# Patient Record
Sex: MALE | Race: BLACK OR AFRICAN AMERICAN | NOT HISPANIC OR LATINO | URBAN - METROPOLITAN AREA
[De-identification: names, ages, dates, MRNs, and addresses within clinical notes are randomized per-mention and may not be internally consistent; named-entity substitution may affect disease eponyms.]

---

## 2018-03-06 ENCOUNTER — OUTPATIENT (OUTPATIENT)
Dept: OUTPATIENT SERVICES | Facility: HOSPITAL | Age: 18
LOS: 1 days | Discharge: HOME | End: 2018-03-06
Payer: COMMERCIAL

## 2018-03-06 LAB
ALBUMIN SERPL ELPH-MCNC: 5.1 G/DL — HIGH (ref 3.3–5)
ALBUMIN SERPL ELPH-MCNC: 5.1 G/DL — HIGH (ref 3.3–5)
ALP SERPL-CCNC: 56 U/L — LOW (ref 60–270)
ALP SERPL-CCNC: 56 U/L — LOW (ref 60–270)
ALT FLD-CCNC: 13 U/L — SIGNIFICANT CHANGE UP (ref 4–41)
ALT FLD-CCNC: 13 U/L — SIGNIFICANT CHANGE UP (ref 4–41)
AST SERPL-CCNC: 19 U/L — SIGNIFICANT CHANGE UP (ref 4–40)
AST SERPL-CCNC: 19 U/L — SIGNIFICANT CHANGE UP (ref 4–40)
BASOPHILS # BLD AUTO: 0.02 K/UL — SIGNIFICANT CHANGE UP (ref 0–0.2)
BASOPHILS NFR BLD AUTO: 0.4 % — SIGNIFICANT CHANGE UP (ref 0–2)
BILIRUB DIRECT SERPL-MCNC: 0.2 MG/DL — SIGNIFICANT CHANGE UP (ref 0.1–0.2)
BILIRUB SERPL-MCNC: 0.7 MG/DL — SIGNIFICANT CHANGE UP (ref 0.2–1.2)
BILIRUB SERPL-MCNC: 0.7 MG/DL — SIGNIFICANT CHANGE UP (ref 0.2–1.2)
BUN SERPL-MCNC: 14 MG/DL — SIGNIFICANT CHANGE UP (ref 7–23)
CALCIUM SERPL-MCNC: 9.3 MG/DL — SIGNIFICANT CHANGE UP (ref 8.4–10.5)
CHLORIDE SERPL-SCNC: 101 MMOL/L — SIGNIFICANT CHANGE UP (ref 98–107)
CHOLEST SERPL-MCNC: 156 MG/DL — SIGNIFICANT CHANGE UP (ref 120–199)
CO2 SERPL-SCNC: 31 MMOL/L — SIGNIFICANT CHANGE UP (ref 22–31)
CREAT SERPL-MCNC: 0.94 MG/DL — SIGNIFICANT CHANGE UP (ref 0.5–1.3)
EOSINOPHIL # BLD AUTO: 0.08 K/UL — SIGNIFICANT CHANGE UP (ref 0–0.5)
EOSINOPHIL NFR BLD AUTO: 1.8 % — SIGNIFICANT CHANGE UP (ref 0–6)
GLUCOSE SERPL-MCNC: 85 MG/DL — SIGNIFICANT CHANGE UP (ref 70–99)
HCT VFR BLD CALC: 43.3 % — SIGNIFICANT CHANGE UP (ref 39–50)
HDLC SERPL-MCNC: 62 MG/DL — HIGH (ref 35–55)
HGB BLD-MCNC: 14.7 G/DL — SIGNIFICANT CHANGE UP (ref 13–17)
IMM GRANULOCYTES # BLD AUTO: 0.01 # — SIGNIFICANT CHANGE UP
IMM GRANULOCYTES NFR BLD AUTO: 0.2 % — SIGNIFICANT CHANGE UP (ref 0–1.5)
LIPID PNL WITH DIRECT LDL SERPL: 85 MG/DL — SIGNIFICANT CHANGE UP
LYMPHOCYTES # BLD AUTO: 1.82 K/UL — SIGNIFICANT CHANGE UP (ref 1–3.3)
LYMPHOCYTES # BLD AUTO: 40.5 % — SIGNIFICANT CHANGE UP (ref 13–44)
MCHC RBC-ENTMCNC: 30.4 PG — SIGNIFICANT CHANGE UP (ref 27–34)
MCHC RBC-ENTMCNC: 33.9 % — SIGNIFICANT CHANGE UP (ref 32–36)
MCV RBC AUTO: 89.6 FL — SIGNIFICANT CHANGE UP (ref 80–100)
MONOCYTES # BLD AUTO: 0.37 K/UL — SIGNIFICANT CHANGE UP (ref 0–0.9)
MONOCYTES NFR BLD AUTO: 8.2 % — SIGNIFICANT CHANGE UP (ref 2–14)
NEUTROPHILS # BLD AUTO: 2.19 K/UL — SIGNIFICANT CHANGE UP (ref 1.8–7.4)
NEUTROPHILS NFR BLD AUTO: 48.9 % — SIGNIFICANT CHANGE UP (ref 43–77)
NRBC # FLD: 0 — SIGNIFICANT CHANGE UP
PLATELET # BLD AUTO: 184 K/UL — SIGNIFICANT CHANGE UP (ref 150–400)
PMV BLD: 11.7 FL — SIGNIFICANT CHANGE UP (ref 7–13)
POTASSIUM SERPL-MCNC: 4.2 MMOL/L — SIGNIFICANT CHANGE UP (ref 3.5–5.3)
POTASSIUM SERPL-SCNC: 4.2 MMOL/L — SIGNIFICANT CHANGE UP (ref 3.5–5.3)
PROT SERPL-MCNC: 7.8 G/DL — SIGNIFICANT CHANGE UP (ref 6–8.3)
PROT SERPL-MCNC: 7.8 G/DL — SIGNIFICANT CHANGE UP (ref 6–8.3)
RBC # BLD: 4.83 M/UL — SIGNIFICANT CHANGE UP (ref 4.2–5.8)
RBC # FLD: 13 % — SIGNIFICANT CHANGE UP (ref 10.3–14.5)
SODIUM SERPL-SCNC: 143 MMOL/L — SIGNIFICANT CHANGE UP (ref 135–145)
T4 FREE SERPL-MCNC: 1.27 NG/DL — SIGNIFICANT CHANGE UP (ref 0.9–1.8)
TRIGL SERPL-MCNC: 42 MG/DL — SIGNIFICANT CHANGE UP (ref 10–149)
TSH SERPL-MCNC: 1.42 UIU/ML — SIGNIFICANT CHANGE UP (ref 0.5–4.3)
WBC # BLD: 4.49 K/UL — SIGNIFICANT CHANGE UP (ref 3.8–10.5)
WBC # FLD AUTO: 4.49 K/UL — SIGNIFICANT CHANGE UP (ref 3.8–10.5)

## 2018-03-06 PROCEDURE — 90792 PSYCH DIAG EVAL W/MED SRVCS: CPT

## 2018-03-07 DIAGNOSIS — F39 UNSPECIFIED MOOD [AFFECTIVE] DISORDER: ICD-10-CM

## 2018-03-07 LAB — PROLACTIN SERPL-MCNC: 16.9 NG/ML — SIGNIFICANT CHANGE UP (ref 4.1–18.4)

## 2018-03-16 PROBLEM — Z00.00 ENCOUNTER FOR PREVENTIVE HEALTH EXAMINATION: Status: ACTIVE | Noted: 2018-03-16

## 2018-03-22 ENCOUNTER — OUTPATIENT (OUTPATIENT)
Dept: OUTPATIENT SERVICES | Facility: HOSPITAL | Age: 18
LOS: 1 days | End: 2018-03-22
Payer: SUBSIDIZED

## 2018-03-22 ENCOUNTER — APPOINTMENT (OUTPATIENT)
Dept: MRI IMAGING | Facility: HOSPITAL | Age: 18
End: 2018-03-22

## 2018-03-22 DIAGNOSIS — Z00.6 ENCOUNTER FOR EXAMINATION FOR NORMAL COMPARISON AND CONTROL IN CLINICAL RESEARCH PROGRAM: ICD-10-CM

## 2018-03-22 PROCEDURE — 70551 MRI BRAIN STEM W/O DYE: CPT | Mod: 26

## 2018-03-22 PROCEDURE — 70551 MRI BRAIN STEM W/O DYE: CPT

## 2018-05-22 ENCOUNTER — APPOINTMENT (OUTPATIENT)
Dept: MRI IMAGING | Facility: HOSPITAL | Age: 18
End: 2018-05-22

## 2018-05-22 ENCOUNTER — OUTPATIENT (OUTPATIENT)
Dept: OUTPATIENT SERVICES | Facility: HOSPITAL | Age: 18
LOS: 1 days | End: 2018-05-22
Payer: SUBSIDIZED

## 2018-05-22 DIAGNOSIS — Z00.6 ENCOUNTER FOR EXAMINATION FOR NORMAL COMPARISON AND CONTROL IN CLINICAL RESEARCH PROGRAM: ICD-10-CM

## 2018-05-22 PROCEDURE — 70551 MRI BRAIN STEM W/O DYE: CPT

## 2018-05-22 PROCEDURE — 70551 MRI BRAIN STEM W/O DYE: CPT | Mod: 26

## 2018-06-06 ENCOUNTER — OUTPATIENT (OUTPATIENT)
Dept: OUTPATIENT SERVICES | Facility: HOSPITAL | Age: 18
LOS: 1 days | Discharge: HOME | End: 2018-06-06

## 2018-07-27 ENCOUNTER — APPOINTMENT (OUTPATIENT)
Dept: MRI IMAGING | Facility: HOSPITAL | Age: 18
End: 2018-07-27

## 2018-08-06 ENCOUNTER — APPOINTMENT (OUTPATIENT)
Dept: MRI IMAGING | Facility: HOSPITAL | Age: 18
End: 2018-08-06

## 2018-08-06 ENCOUNTER — OUTPATIENT (OUTPATIENT)
Dept: OUTPATIENT SERVICES | Facility: HOSPITAL | Age: 18
LOS: 1 days | End: 2018-08-06
Payer: SUBSIDIZED

## 2018-08-06 DIAGNOSIS — G93.9 DISORDER OF BRAIN, UNSPECIFIED: ICD-10-CM

## 2018-08-06 PROCEDURE — 70551 MRI BRAIN STEM W/O DYE: CPT

## 2018-08-07 PROCEDURE — 70551 MRI BRAIN STEM W/O DYE: CPT | Mod: 26

## 2018-09-12 ENCOUNTER — OUTPATIENT (OUTPATIENT)
Dept: OUTPATIENT SERVICES | Facility: HOSPITAL | Age: 18
LOS: 1 days | Discharge: HOME | End: 2018-09-12
Payer: COMMERCIAL

## 2018-09-12 PROCEDURE — 90792 PSYCH DIAG EVAL W/MED SRVCS: CPT

## 2018-09-25 DIAGNOSIS — F32.9 MAJOR DEPRESSIVE DISORDER, SINGLE EPISODE, UNSPECIFIED: ICD-10-CM

## 2018-10-11 ENCOUNTER — EMERGENCY (EMERGENCY)
Facility: HOSPITAL | Age: 18
LOS: 1 days | Discharge: HOME | End: 2018-10-11
Attending: EMERGENCY MEDICINE | Admitting: EMERGENCY MEDICINE
Payer: MEDICAID

## 2018-10-11 VITALS
OXYGEN SATURATION: 99 % | HEART RATE: 79 BPM | RESPIRATION RATE: 18 BRPM | DIASTOLIC BLOOD PRESSURE: 97 MMHG | TEMPERATURE: 100 F | SYSTOLIC BLOOD PRESSURE: 139 MMHG

## 2018-10-11 DIAGNOSIS — R69 ILLNESS, UNSPECIFIED: ICD-10-CM

## 2018-10-11 DIAGNOSIS — F32.9 MAJOR DEPRESSIVE DISORDER, SINGLE EPISODE, UNSPECIFIED: ICD-10-CM

## 2018-10-11 PROCEDURE — 99284 EMERGENCY DEPT VISIT MOD MDM: CPT

## 2018-10-11 PROCEDURE — 90792 PSYCH DIAG EVAL W/MED SRVCS: CPT

## 2018-10-11 RX ORDER — TETANUS TOXOID, REDUCED DIPHTHERIA TOXOID AND ACELLULAR PERTUSSIS VACCINE, ADSORBED 5; 2.5; 8; 8; 2.5 [IU]/.5ML; [IU]/.5ML; UG/.5ML; UG/.5ML; UG/.5ML
0.5 SUSPENSION INTRAMUSCULAR ONCE
Qty: 0 | Refills: 0 | Status: COMPLETED | OUTPATIENT
Start: 2018-10-11 | End: 2018-10-11

## 2018-10-11 RX ADMIN — TETANUS TOXOID, REDUCED DIPHTHERIA TOXOID AND ACELLULAR PERTUSSIS VACCINE, ADSORBED 0.5 MILLILITER(S): 5; 2.5; 8; 8; 2.5 SUSPENSION INTRAMUSCULAR at 20:41

## 2018-10-11 NOTE — ED PROVIDER NOTE - MEDICAL DECISION MAKING DETAILS
SI, pt to be evaluated by psych. do not suspect electrolyte abnormality or ingestion. Will update tetanus shot  as with new abrasions. do not think cellulitis, but will give topical abx.

## 2018-10-11 NOTE — ED PROVIDER NOTE - OBJECTIVE STATEMENT
18M with pmh of depression brought over by his therapist for evaluation for SI. 18M with pmh of depression brought over by his therapist for evaluation for SI. Pt reports that has been feeling down had suicidal thoughts last week and also has been cutting his left forearm. Denies current SI/HI, drug use, hallucinations. Not on meds. Not sure of his last tetanus shot.

## 2018-10-11 NOTE — ED ADULT NURSE NOTE - OBJECTIVE STATEMENT
Patient received in  c/o psych eval, pt is A&Ox4, calm and in good behavioral control. Denies SI at this time, but endorsed previous SI with no plans. pt denies HI&AH. Denies ETOH and substance use. psych eval ongoing

## 2018-10-11 NOTE — ED BEHAVIORAL HEALTH ASSESSMENT NOTE - OTHER PAST PSYCHIATRIC HISTORY (INCLUDE DETAILS REGARDING ONSET, COURSE OF ILLNESS, INPATIENT/OUTPATIENT TREATMENT)
PPH MDD, R/O Prodromal Schizophrenia. Patient has no history of suicide attempts. [facility]es [facility].

## 2018-10-11 NOTE — ED BEHAVIORAL HEALTH ASSESSMENT NOTE - DESCRIPTION
During course of ED visit patient was calm and cooperative. Patient was not aggressive or violent and did not require PRN medications.    Vital Signs Last 24 Hrs  T(C): 37.6 (11 Oct 2018 15:48), Max: 37.6 (11 Oct 2018 15:48)  T(F): 99.6 (11 Oct 2018 15:48), Max: 99.6 (11 Oct 2018 15:48)  HR: 79 (11 Oct 2018 15:48) (79 - 79)  BP: 139/97 (11 Oct 2018 15:48) (139/97 - 139/97)  BP(mean): --  RR: 18 (11 Oct 2018 15:48) (18 - 18)  SpO2: 99% (11 Oct 2018 15:48) (99% - 99%) none see hpi

## 2018-10-11 NOTE — ED ADULT NURSE NOTE - NSIMPLEMENTINTERV_GEN_ALL_ED
Implemented All Universal Safety Interventions:  [place] to call system. Call bell, personal items and telephone within reach. Instruct patient to call for assistance. Room bathroom lighting operational. Non-slip footwear when patient is off stretcher. Physically safe environment: no spills, clutter or unnecessary equipment. Stretcher in lowest position, wheels locked, appropriate side rails in place.

## 2018-10-11 NOTE — ED ADULT TRIAGE NOTE - CHIEF COMPLAINT QUOTE
pt arrives with his psychologist to ER, states that he has been feeling depressed over the past few months, worsening this week. states he was cutting himself and left messages on the therapist phone saying he wanted to kill himself with alcohol and slitting his wrist. pt has multiple cuts on bilateral wrist not open or bleeding at this time. pt states currently does not feel suicidal, but agrees to feeling depressed and anxious.

## 2018-10-11 NOTE — ED BEHAVIORAL HEALTH NOTE - BEHAVIORAL HEALTH NOTE
Writer contacted patient's father Avery Yost [phone].  Writer informed Mr. Yost that his son was currently at the [facility] Emergency Department.  Writer assured pt's father that pt was stable and safe but staff needed collateral information.  Father was asked if patient had exhibited any troubling behavior recently.  Father denied any troubling behaviors recently but did say that he noticed that pt had "scrapes on his arms and legs."  When he asked his son about the scrapes pt stated that he scratched his himself on his desk.  Mr. Yost said that he examined the desk and asked pt why he would sit so close to the desk to which pt responded "I don't know."  Mr. Yost denied patient had suicidal ideation, self injurious behavior, auditory/visual hallucinations.  Mr. Yost stated that pt did not have assess to firearms or weapons such as razors.  Mr. Yost stated that his son was "depressed or something like that one or two years ago."  Patient failed out of college last semester; Mr Yost stated that it was due to his lack of motivation.  Mr. Yost also said that pt had a similar issue with motivation during his senior year of high school.  Writer informed Mr. Yost that his son would be discharged and he was given the address of the hospital.  He said that he was currently in [place] and that he would not be able to  his son until after 9pm. Writer contacted patient's father Avery Yost [phone]. Father was asked if patient had exhibited any troubling behavior recently.  Father denied any troubling behaviors recently but did say that he noticed that pt had "scrapes on his arms and legs."  When he asked his son about the scrapes pt stated that he scratched his himself on his desk.  Mr. Yost said that he examined the desk and asked pt why he would sit so close to the desk to which pt responded "I don't know."  Mr. Yost denied patient had suicidal ideation, auditory/visual hallucinations.  Mr. Yost stated that pt did not have assess to firearms or weapons.  Mr. Yost stated that his son was "depressed or something like that one or two years ago."  Patient failed out of college last semester; Mr Yost stated that it was due to his lack of motivation.  Mr. Yost also said that pt had a similar issue with motivation during his senior year of high school.  He had no safety concerns regarding patient. Writer contacted patient's father Avery Yost [phone]. Father was asked if patient had exhibited any troubling behavior recently.  Father denied any troubling behaviors recently but did say that he noticed that pt had "scrapes on his arms and legs."  When he asked his son about the scrapes pt stated that he scratched his himself on his desk. Mr. Yost denied patient had suicidal ideation, auditory/visual hallucinations.  Mr. Yost stated that pt did not have assess to firearms or weapons.  He said patient is acting normally. Mr. Yost stated that his son was "depressed or something like that one or two years ago."  Patient failed out of college last semester; Mr Yost stated that it was due to his lack of motivation.  Mr. Yost also said that pt had a similar issue with motivation during his senior year of high school.  He had no safety concerns regarding patient.

## 2018-10-11 NOTE — ED BEHAVIORAL HEALTH ASSESSMENT NOTE - SUMMARY
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 PPH MDD, R/O Prodromal Schizophrenia. Patient has no history of suicide attempts.  Chronic history of NSSIB. He has no history of aggressive or violent behavior. Patient has a past history of substance use- binge drinking alcohol in HS- last drank 2 "coffee cups" of alcohol last week; reports periodic use since HS. He has no history of detox/rehab or withdrawal symptoms. He has no legal issues. He denies [facility]. [facility] therapist for suicidal ideation.    Patient presents to the ER in the context  of suicidal ideation. Patient adamantly denies SI/HI/urges for SIB/intent/plan. He endorsed SIB without suicidal intent and drinking last week due to stressful conversation with mom. He stated this week his mood and symptoms feel better as he is moving past conversation with mother. Per collateral and past documentation from EM note 10/4/18 patient has a history of making provocative suicidal statements. Patient denies SI/HI/urges for SIB/intent/plan. He is future oriented and able to safety plan. Patient was offered and refused inpatient psychiatric admission. He does not meet criteria for involuntary inpatient admission. Recommend discharge home and follow up with Dr. Schreiber tomorrow at 12pm. Extensive safety planning performed. Patient and family agreeing verbally to return patient to ER or call 911 if symptoms worsen or patient has urges to harm self or others. 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 PPH MDD, R/O Prodromal Schizophrenia. Patient has no history of suicide attempts.  Chronic history of NSSIB. He has no history of aggressive or violent behavior. Patient has a past history of substance use- binge drinking alcohol in HS- last drank 2 "coffee cups" of alcohol last week; reports periodic use since HS. He has no history of detox/rehab or withdrawal symptoms. He has no legal issues. He denies [facility]. [facility] therapist for suicidal ideation.    Patient presents to the ER in the context  of suicidal ideation. Patient adamantly denies SI/HI/urges for SIB/intent/plan. He endorsed SIB without suicidal intent and drinking last week due to stressful conversation with mom. He has a chronic history of NSSIB. He stated this week his mood and symptoms feel better as he is moving past conversation with mother. Per collateral and past documentation from EM note 10/4/18 patient has a history of making provocative suicidal statements. Patient denies SI/HI/urges for SIB/intent/plan. He is future oriented and able to safety plan. Patient was offered and refused inpatient psychiatric admission. He does not meet criteria for involuntary inpatient admission. Recommend discharge home and follow up with Dr. Schreiber tomorrow at 12pm. Extensive safety planning performed. Patient and family agreeing verbally to return patient to ER or call 911 if symptoms worsen or patient has urges to harm self or others.
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
## 2018-10-11 NOTE — ED BEHAVIORAL HEALTH ASSESSMENT NOTE - SUICIDE PROTECTIVE FACTORS
Engaged in work or school/Positive therapeutic relationships/Responsibility to family and others/Future oriented/Supportive social network or family

## 2018-10-11 NOTE — ED BEHAVIORAL HEALTH ASSESSMENT NOTE - SUICIDE RISK FACTORS
History of abuse/trauma/Substance abuse/dependence/Global insomnia History of abuse/trauma/Substance abuse/dependence/Other

## 2018-10-11 NOTE — ED BEHAVIORAL HEALTH ASSESSMENT NOTE - CASE SUMMARY
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 PPH MDD, R/O Prodromal Schizophrenia. Patient has no history of suicide attempts.  Chronic history of NSSIB. He has no history of aggressive or violent behavior. Patient has a past history of substance use- binge drinking alcohol in HS- last drank 2 "coffee cups" of alcohol last week; reports periodic use since HS. He has no history of detox/rehab or withdrawal symptoms. He has no legal issues. He denies [facility]. [facility] therapist for suicidal ideation. Patient has hx of making provocative suicidal statements but by his own admission without intent. Last week, he had emotional dysregulation and cutting secondary to receiving a difficult phone call from his mother, and the patient had a tumultuous upbringing. Collateral hx from dad denies acute safety concerns. He adamantly denies suicidal intent and is able to safety plan, He refused voluntary admission when offered, and as he does not pose an imminent danger to self he does not meet criteria  for involuntary admission. He will follow up with his outpatient psychiatrist tomorrow. Risk planning done with patient.

## 2018-10-11 NOTE — ED BEHAVIORAL HEALTH ASSESSMENT NOTE - NS ED BHA BILLING ATTENDING W NP TRAINEE
Increase protein with meals.  Protein sources such as Greek yogurt, nuts, protein bars and Tofu    10-12 weeks after delivery, will need to have 2 hour glucose tolerance test and repeat A1-C    Continue checking blood sugars four times day    Weekly email with blood sugars updates to [facility] [place] or have Dr. Merida forward them to me    Call if any questions or concerns     [phone]

## 2018-10-11 NOTE — ED PROVIDER NOTE - PHYSICAL EXAMINATION
GEN - NAD; well appearing; A+O x3   HEAD - NC/AT     EYES - EOMI, no conjunctival pallor, no scleral icterus  ENT -   mucous membranes  moist , no discharge      NECK - Neck supple  PULM - CTA b/l,  symmetric breath sounds  COR -  RRR, S1 S2, no murmurs  ABD - , ND, NT, soft, no guarding, no rebound, no masses    BACK - no CVA tenderness, nontender spine     EXTREMS - no edema, no deformity, warm and well perfused    SKIN - L forearm with multiple superficial lacerations, some scabbed over, there is minimal surrounding erythema. No exudate, fluctuance or tenderness. NVI distally.  NEUROLOGIC - alert, sensation nl, motor 5/5 RUE/LUE/RLE/LLE  PSYCH - no SI/HI

## 2018-10-11 NOTE — ED BEHAVIORAL HEALTH ASSESSMENT NOTE - HPI (INCLUDE ILLNESS QUALITY, SEVERITY, DURATION, TIMING, CONTEXT, MODIFYING FACTORS, ASSOCIATED SIGNS AND SYMPTOMS)
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 PPH MDD, R/O Prodromal Schizophrenia. Patient has no history of suicide attempts.  Chronic history of NSSIB. He has no history of aggressive or violent behavior. Patient has a past history of substance use- binge drinking alcohol in HS- last drank 2 "coffee cups" of alcohol last week; reports periodic use since HS. He has no history of detox/rehab or withdrawal symptoms. He has no legal issues. He denies [facility]. [facility] therapist for suicidal ideation.    Patient report he came to the ER because his therapist wanted him to come due to some things she told him about last week. He reports last week his mood was "a little lower," after a conversation he had with his mother. He said his mother called him and told her about his depression and he didn't feel validated. He superficially cut his left forearm horizontally with a razor without suicidal intent to help relieve stress. He stated he also drank to make himself feel better. Patient denied suicidal intent with his actions. He stated he did have a suicidal thought last week after this conversation but denied plan/intent and reports it only lasted a short time and he listened to music and felt better.    He stated he has chronic issues with his sleep cycles (diurnal pattern). He endorsed chronic issues with anxiety which has been "a little worse," since the conversation with his mother but stated he feels good today.    Patient denies any hallucinations, does not report any delusional thought content, denies thought insertion/withdrawal, denies referential thought processes & is not paranoid on interview. Pt is linear,logical, organized and well related. Patient does not report nor exhibit any signs of peter, including irritable or elevated mood, grandiosity, pressured speech, risk-taking behaviors, increase in productivity or agitation. Patient denies any depressive symptoms including depressed mood, anhedonia, changes in energy/concentration/appetite, sleep disturbances, preoccupation with death or feelings of guilt. Patient was confronted with therapist report of suicidal ideation and he adamantly denies SI, intent or plan; denies any HI, violent thoughts. He stated his therapist misunderstood.    He was future oriented and stated this week his mood is better because he is forgetting about the conversation with his mother. 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 He agreed to see Dr. Schreiber tomorrow at 12pm.     See  note for collateral information. 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 PPH MDD, R/O Prodromal Schizophrenia. Patient has no history of suicide attempts.  Chronic history of NSSIB. He has no history of aggressive or violent behavior. Patient has a past history of substance use- binge drinking alcohol in HS- last drank 2 "coffee cups" of alcohol last week; reports periodic use since HS. He has no history of detox/rehab or withdrawal symptoms. He has no legal issues. He denies PM. [facility] therapist for suicidal ideation.    Patient report he came to the ER because his therapist wanted him to come due to some things he told her about last week. He reports last week his mood was "a little lower," after a conversation he had with his mother. He said his mother called him and told her about his depression and he didn't feel validated. He superficially cut his left forearm horizontally with a razor without suicidal intent to help relieve stress. He stated he also drank to make himself feel better. Patient denied suicidal intent with his actions. He stated he did have a suicidal thought last week after this conversation but denied active SI/plan/intent and reports it only lasted a short time and he listened to music and felt better.    He stated he has chronic issues with his sleep cycles (diurnal pattern). He endorsed chronic issues with anxiety which has been "a little worse," since the conversation with his mother but stated he feels good today.    Patient denies any hallucinations, does not report any delusional thought content, denies thought insertion/withdrawal, denies referential thought processes & is not paranoid on interview. Pt is linear, logical, organized and well related. Patient does not report nor exhibit any signs of peter, including irritable or elevated mood, grandiosity, pressured speech, risk-taking behaviors, increase in productivity or agitation. Patient denies anhedonia, changes in energy/concentration/appetite, hopelessness, preoccupation with death or feelings of guilt. Patient was confronted with therapist report of suicidal ideation and he adamantly denies SI, intent or plan; denies any HI, violent thoughts.     He was future oriented and stated this week his mood is better because he is forgetting about the conversation with his mother. 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 He agreed to see Dr. Schreiber tomorrow at 12pm. He stated he plans to  remeron tonight but hasn't previously because "I'm lazy."    See  note for collateral information.

## 2018-10-11 NOTE — ED BEHAVIORAL HEALTH ASSESSMENT NOTE - RISK ASSESSMENT
patient does not present an imminent risk to self or others as evidence by no suicidal thoughts/plan/intent, no urges for continued SIB, no peter, no psychosis, reports improvement in depressed mood, outpatient treaters, no history of suicide attempts, no aggression/violence, no legal issues, future oriented and able to safety plan.     risk factors- NSSIB, history of provocative suicidal statements, r/o borderline personality, history of trauma, periodic alcohol use, diurnal sleep variation

## 2018-10-11 NOTE — ED BEHAVIORAL HEALTH NOTE - BEHAVIORAL HEALTH NOTE
Spoke with Dr. Schreiber at [facility]- He has seen patient 2x. Although patient is in [facility] he does not appear psychotic or report psychotic symptoms. He was contacted today by patient's therapist expressing concern regarding patient and he had planned to see patient but therapist brought patient to the ER. He has only seen patient 2x. Patient endorses depressive symptoms and his suicidal ideation appears possibly chronic. He endorses SIB stating it goes un noticed by his family which in turn makes him self injure more. He recently started patent on Remeron. Spoke with Dr. Schreiber at [facility]- He has seen patient 2x. Although patient is in [facility] he does not appear psychotic or report psychotic symptoms. He was contacted today by patient's therapist expressing concern regarding patient and he had planned to see patient but therapist brought patient to the ER. He stated the patient has stated he does not like the therapist. He has een patient 2x. Patient endorses depressive symptoms and his suicidal ideation appears possibly chronic. Dr. Schreiber reports during last session patient made a provocative suicidal statement. He endorses SIB stating it goes un noticed by his family which in turn makes him self injure more.  He recently started patent on Remeron. He can see patient tomorrow at 12pm. Spoke with Dr. Schreiber at [facility]- He has seen patient 2x. Although patient is in [facility] he does not appear psychotic or report psychotic symptoms. He was contacted today by patient's therapist expressing concern regarding patient and he had planned to see patient but therapist brought patient to the ER. He stated the patient has stated he does not like the therapist. He has seen patient 2x. Patient endorses depressive symptoms and his suicidal ideation appears chronic. Dr. Schreiber reports during last session patient made a provocative suicidal statement. He endorses SIB stating it goes unnoticed by his family.  He recently started patent on Remeron. He can see patient tomorrow at 12pm.

## 2019-05-13 ENCOUNTER — EMERGENCY (EMERGENCY)
Facility: HOSPITAL | Age: 19
LOS: 1 days | Discharge: HOME | End: 2019-05-13
Attending: EMERGENCY MEDICINE | Admitting: EMERGENCY MEDICINE
Payer: COMMERCIAL

## 2019-05-13 VITALS
SYSTOLIC BLOOD PRESSURE: 128 MMHG | DIASTOLIC BLOOD PRESSURE: 85 MMHG | RESPIRATION RATE: 16 BRPM | HEART RATE: 81 BPM | OXYGEN SATURATION: 100 % | TEMPERATURE: 98 F

## 2019-05-13 VITALS
RESPIRATION RATE: 18 BRPM | DIASTOLIC BLOOD PRESSURE: 69 MMHG | OXYGEN SATURATION: 99 % | SYSTOLIC BLOOD PRESSURE: 123 MMHG | HEART RATE: 86 BPM

## 2019-05-13 DIAGNOSIS — F60.3 BORDERLINE PERSONALITY DISORDER: ICD-10-CM

## 2019-05-13 LAB
ALBUMIN SERPL ELPH-MCNC: 4.99 G/DL — SIGNIFICANT CHANGE UP (ref 3.3–5)
ALP SERPL-CCNC: 52 U/L — SIGNIFICANT CHANGE UP (ref 40–120)
ALT FLD-CCNC: 15 U/L — SIGNIFICANT CHANGE UP (ref 4–41)
AMPHET UR-MCNC: NEGATIVE — SIGNIFICANT CHANGE UP
ANION GAP SERPL CALC-SCNC: 14 MMO/L — SIGNIFICANT CHANGE UP (ref 7–14)
APAP SERPL-MCNC: < 15 UG/ML — LOW (ref 15–25)
APPEARANCE UR: CLEAR — SIGNIFICANT CHANGE UP
AST SERPL-CCNC: 22 U/L — SIGNIFICANT CHANGE UP (ref 4–40)
BARBITURATES UR SCN-MCNC: NEGATIVE — SIGNIFICANT CHANGE UP
BASOPHILS # BLD AUTO: 0.03 K/UL — SIGNIFICANT CHANGE UP (ref 0–0.2)
BASOPHILS NFR BLD AUTO: 0.7 % — SIGNIFICANT CHANGE UP (ref 0–2)
BENZODIAZ UR-MCNC: NEGATIVE — SIGNIFICANT CHANGE UP
BILIRUB SERPL-MCNC: 0.3 MG/DL — SIGNIFICANT CHANGE UP (ref 0.2–1.2)
BILIRUB UR-MCNC: NEGATIVE — SIGNIFICANT CHANGE UP
BLOOD UR QL VISUAL: NEGATIVE — SIGNIFICANT CHANGE UP
BUN SERPL-MCNC: 7 MG/DL — SIGNIFICANT CHANGE UP (ref 7–23)
CALCIUM SERPL-MCNC: 9.5 MG/DL — SIGNIFICANT CHANGE UP (ref 8.4–10.5)
CANNABINOIDS UR-MCNC: NEGATIVE — SIGNIFICANT CHANGE UP
CHLORIDE SERPL-SCNC: 105 MMOL/L — SIGNIFICANT CHANGE UP (ref 98–107)
CO2 SERPL-SCNC: 26 MMOL/L — SIGNIFICANT CHANGE UP (ref 22–31)
COCAINE METAB.OTHER UR-MCNC: NEGATIVE — SIGNIFICANT CHANGE UP
COLOR SPEC: COLORLESS — SIGNIFICANT CHANGE UP
CREAT SERPL-MCNC: 0.97 MG/DL — SIGNIFICANT CHANGE UP (ref 0.5–1.3)
EOSINOPHIL # BLD AUTO: 0.03 K/UL — SIGNIFICANT CHANGE UP (ref 0–0.5)
EOSINOPHIL NFR BLD AUTO: 0.7 % — SIGNIFICANT CHANGE UP (ref 0–6)
ETHANOL BLD-MCNC: 188 MG/DL — HIGH
GLUCOSE SERPL-MCNC: 97 MG/DL — SIGNIFICANT CHANGE UP (ref 70–99)
GLUCOSE UR-MCNC: NEGATIVE — SIGNIFICANT CHANGE UP
HCT VFR BLD CALC: 43.2 % — SIGNIFICANT CHANGE UP (ref 39–50)
HGB BLD-MCNC: 14.2 G/DL — SIGNIFICANT CHANGE UP (ref 13–17)
IMM GRANULOCYTES NFR BLD AUTO: 0.2 % — SIGNIFICANT CHANGE UP (ref 0–1.5)
KETONES UR-MCNC: NEGATIVE — SIGNIFICANT CHANGE UP
LEUKOCYTE ESTERASE UR-ACNC: NEGATIVE — SIGNIFICANT CHANGE UP
LIDOCAIN IGE QN: 13.1 U/L — SIGNIFICANT CHANGE UP (ref 7–60)
LYMPHOCYTES # BLD AUTO: 1.96 K/UL — SIGNIFICANT CHANGE UP (ref 1–3.3)
LYMPHOCYTES # BLD AUTO: 47.9 % — HIGH (ref 13–44)
MCHC RBC-ENTMCNC: 28.9 PG — SIGNIFICANT CHANGE UP (ref 27–34)
MCHC RBC-ENTMCNC: 32.9 % — SIGNIFICANT CHANGE UP (ref 32–36)
MCV RBC AUTO: 88 FL — SIGNIFICANT CHANGE UP (ref 80–100)
METHADONE UR-MCNC: NEGATIVE — SIGNIFICANT CHANGE UP
MONOCYTES # BLD AUTO: 0.41 K/UL — SIGNIFICANT CHANGE UP (ref 0–0.9)
MONOCYTES NFR BLD AUTO: 10 % — SIGNIFICANT CHANGE UP (ref 2–14)
NEUTROPHILS # BLD AUTO: 1.65 K/UL — LOW (ref 1.8–7.4)
NEUTROPHILS NFR BLD AUTO: 40.5 % — LOW (ref 43–77)
NITRITE UR-MCNC: NEGATIVE — SIGNIFICANT CHANGE UP
NRBC # FLD: 0 K/UL — SIGNIFICANT CHANGE UP (ref 0–0)
OPIATES UR-MCNC: NEGATIVE — SIGNIFICANT CHANGE UP
OXYCODONE UR-MCNC: NEGATIVE — SIGNIFICANT CHANGE UP
PCP UR-MCNC: NEGATIVE — SIGNIFICANT CHANGE UP
PH UR: 7 — SIGNIFICANT CHANGE UP (ref 5–8)
PLATELET # BLD AUTO: 178 K/UL — SIGNIFICANT CHANGE UP (ref 150–400)
PMV BLD: 10.6 FL — SIGNIFICANT CHANGE UP (ref 7–13)
POTASSIUM SERPL-MCNC: 3.8 MMOL/L — SIGNIFICANT CHANGE UP (ref 3.5–5.3)
POTASSIUM SERPL-SCNC: 3.8 MMOL/L — SIGNIFICANT CHANGE UP (ref 3.5–5.3)
PROT SERPL-MCNC: 7.5 G/DL — SIGNIFICANT CHANGE UP (ref 6–8.3)
PROT UR-MCNC: NEGATIVE — SIGNIFICANT CHANGE UP
RBC # BLD: 4.91 M/UL — SIGNIFICANT CHANGE UP (ref 4.2–5.8)
RBC # FLD: 13.8 % — SIGNIFICANT CHANGE UP (ref 10.3–14.5)
SALICYLATES SERPL-MCNC: < 5 MG/DL — LOW (ref 15–30)
SODIUM SERPL-SCNC: 145 MMOL/L — SIGNIFICANT CHANGE UP (ref 135–145)
SP GR SPEC: 1.01 — SIGNIFICANT CHANGE UP (ref 1–1.04)
TSH SERPL-MCNC: 1.07 UIU/ML — SIGNIFICANT CHANGE UP (ref 0.27–4.2)
UROBILINOGEN FLD QL: NORMAL — SIGNIFICANT CHANGE UP
WBC # BLD: 4.09 K/UL — SIGNIFICANT CHANGE UP (ref 3.8–10.5)
WBC # FLD AUTO: 4.09 K/UL — SIGNIFICANT CHANGE UP (ref 3.8–10.5)

## 2019-05-13 PROCEDURE — 12001 RPR S/N/AX/GEN/TRNK 2.5CM/<: CPT | Mod: RT

## 2019-05-13 PROCEDURE — 99285 EMERGENCY DEPT VISIT HI MDM: CPT | Mod: 25

## 2019-05-13 PROCEDURE — 90792 PSYCH DIAG EVAL W/MED SRVCS: CPT | Mod: GC

## 2019-05-13 RX ORDER — ONDANSETRON 8 MG/1
4 TABLET, FILM COATED ORAL ONCE
Refills: 0 | Status: COMPLETED | OUTPATIENT
Start: 2019-05-13 | End: 2019-05-13

## 2019-05-13 RX ORDER — SODIUM CHLORIDE 9 MG/ML
1000 INJECTION INTRAMUSCULAR; INTRAVENOUS; SUBCUTANEOUS ONCE
Refills: 0 | Status: DISCONTINUED | OUTPATIENT
Start: 2019-05-13 | End: 2019-05-13

## 2019-05-13 RX ORDER — ONDANSETRON 8 MG/1
4 TABLET, FILM COATED ORAL ONCE
Refills: 0 | Status: DISCONTINUED | OUTPATIENT
Start: 2019-05-13 | End: 2019-05-13

## 2019-05-13 RX ADMIN — ONDANSETRON 4 MILLIGRAM(S): 8 TABLET, FILM COATED ORAL at 08:59

## 2019-05-13 NOTE — ED BEHAVIORAL HEALTH ASSESSMENT NOTE - DESCRIPTION
pt has girlfriend. lives with father and grandmother. not currently employed or in school. Pt initially sedated 2/2 intoxication. On awakening patient was calm and cooperative. Patient was not aggressive or violent and did not require PRN medications.    T(C): 37 (05-13-19 @ 07:55), Max: 37 (05-13-19 @ 07:55)  HR: 86 (05-13-19 @ 16:23) (80 - 86)  BP: 123/69 (05-13-19 @ 16:23) (123/69 - 128/85)  RR: 18 (05-13-19 @ 16:23) (16 - 18)  SpO2: 99% (05-13-19 @ 16:23) (99% - 100%)  Wt(kg): -- none pt has girlfriend. lives with father and grandmother. not currently employed or in school. poor relationship with mother per records

## 2019-05-13 NOTE — ED BEHAVIORAL HEALTH ASSESSMENT NOTE - CASE SUMMARY
18M with depression and borderline personality disorder, brought by dad for evaluation of cutting while intoxicated. trigger seemed to be unpleasant conversation with mother on mother's day, though patient is guarded about this fact. he endorses chronic low level depressed, apathy/amotivation/anhedonia. Reports cutting was impulsive, denies suicidal intent at the time, denies current suicidal ideation intent or plan, no violence or homicidal ideation. Patient is not otherwise psychotic/manic. He does not want admission and not meeting imminent harm criteria for commitment. no evidence of etoh withdrawal. DC to follow up at [facility] later this week. dad and outpatient psychiatrist in agreement with plan.
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
## 2019-05-13 NOTE — ED PROVIDER NOTE - PROGRESS NOTE DETAILS
PGY3 Jovani: pt seen and examined by me. 18 YOM pmh of cutting, p/w intoxication and cutting of right forearm. Pt states he was drinking today and was having suicidal thoughts and decided to cut himself. Pt has had multiple attempts of cutting himself in the past. Pt has been seen by therapist in past. Pt was found by father and brought into ed. +active thoughts of SI no plan. Per dad, triggering event might have been an argument with his mother on mother's day yesterday. Pt is clinically intoxicated currently. Plan: monitor for sobriety, Tdap up to date, repair lacerations to forearm. Have pt seen by psychiatry. LOUIE Escalera- pt in , cleared by psych for dc home and pt to be discharged by psych

## 2019-05-13 NOTE — ED ADULT TRIAGE NOTE - CHIEF COMPLAINT QUOTE
Patient from home for etoh intox. Patient admit to SI to EMS but denies SI currently, denies HI. Patient admits to drinking 2 four lokos today. Per family, patient has tried to cut himself to right forearm, right forearm currently wrapped by EMS. MD Solomon called for BH Evaluation, Patient to be seen in the main.

## 2019-05-13 NOTE — ED ADULT NURSE REASSESSMENT NOTE - NS ED NURSE REASSESS COMMENT FT1
pt received from main ED calm and cooperative. dressing to right forearm s/p lacc dermabond. no bleeding or oozing from dressing noted. full ROM and distal pulses on right arm. pt alert and calm gait off balance s/t intoxication. denies active SI but states he "wants to drink", denies history of withdrawal but will monitor closely. pt received from main ED calm and cooperative. dressing to right forearm s/p lacc dermabond. no bleeding or oozing from dressing noted. full ROM and distal pulses on right arm. pt alert and calm gait off balance s/t intoxication. denies active SI but states he "wants to drink", denies history of withdrawal but will monitor closely. CO DC'd by provider

## 2019-05-13 NOTE — ED PROVIDER NOTE - SKIN COLOR
normal for race/multiple superficial linear lacerations noted to right and left forearm. no active bleeding. neurovascularly intact. clean.

## 2019-05-13 NOTE — ED ADULT NURSE NOTE - NSIMPLEMENTINTERV_GEN_ALL_ED
Implemented All Fall Risk Interventions:  [place] to call system. Call bell, personal items and telephone within reach. Instruct patient to call for assistance. Room bathroom lighting operational. Non-slip footwear when patient is off stretcher. Physically safe environment: no spills, clutter or unnecessary equipment. Stretcher in lowest position, wheels locked, appropriate side rails in place. Provide visual cue, wrist band, yellow gown, etc. Monitor gait and stability. Monitor for mental status changes and reorient to person, place, and time. Review medications for side effects contributing to fall risk. Reinforce activity limits and safety measures with patient and family.

## 2019-05-13 NOTE — ED PROVIDER NOTE - OBJECTIVE STATEMENT
19 y/o male with pmhx of depression and borderline personality disorder (2018) presents to ED for suicidal attempt and alcohol intoxication. Pt states he drank two fourlokos and one beer overnight. States he used a clean knife from his kitchen to cut his right forearm. States he cleaned knife with alcohol prior to using it. Multiple cutting incidents in the past. Lives at home with his father and grandmother. States he was planning on drinking all week. As per dad, pt tried calling his mother on Mother's day yesterday which may have triggered incident. Pt admits to active SI, no plans. No thoughts of hurting others. Pt admits to feeling nauseous, no vomiting. No falls or head trauma, no LOC. Brought in by dad. Follows with outpatient psychiatrist Dr. Schreiber. States he was on two medication he stopped taking after 1 month of trying because they don't work. No fever, chills, cp, sob, abd pain, vomiting, diarrhea, dysuria, hallucinations.

## 2019-05-13 NOTE — ED BEHAVIORAL HEALTH ASSESSMENT NOTE - SUMMARY
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    Pt vague/guarded on interview. States that he just wanted to "get drunk" and did not have suicidal intent when cutting. Denies current depression/SI/urges to cut. Pt able to safety plan. Patient and family agreeing verbally to return patient to ER or call 911 if symptoms worsen or patient has urges to harm self or others. Does not meet criteria for inpatient admission at this time.

## 2019-05-13 NOTE — ED PROVIDER NOTE - CLINICAL SUMMARY MEDICAL DECISION MAKING FREE TEXT BOX
19 y/o male with pmhx of depression and borderline personality disorder (2018) presents to ED for suicidal attempt and alcohol intoxication. plan- check labs, antiemetics, fluids, 1:1 observation, psych consult, laceration repair.

## 2019-05-13 NOTE — ED PROVIDER NOTE - ATTENDING CONTRIBUTION TO CARE
edwin: hx from pt and his father at bedside.   pmh includes borderline personality and self-cutting. not on any  meds. was drinling etoh last night and pt cut his forearms last night, he states he 'half wanted' to die and wanted to 'do worse' but didnt.   denies psychiatry admissions. sees a therapist opt.   exam: awake, cooperative, oriented.  superficial lacerations to forearms none appearing infected and none deep.   there are multiple old scars from cutting to upper and lower ext.  labs sent. Psychiatry contacted. pt to be sent to  for further care.

## 2019-05-13 NOTE — ED ADULT NURSE REASSESSMENT NOTE - NS ED NURSE REASSESS COMMENT FT1
patient alert ox3 came in with SI and was drinking. superficial cuts to both arms noted. Dermabond applied by PA. transferred to  for continuity of care.

## 2019-05-13 NOTE — ED BEHAVIORAL HEALTH ASSESSMENT NOTE - SAFETY PLAN DETAILS
Safety planning performed. Patient and family agreeing verbally to return patient to ER or call 911 if symptoms worsen or patient has urges to harm self or others.

## 2019-05-13 NOTE — ED BEHAVIORAL HEALTH ASSESSMENT NOTE - OTHER
father poverty of speech "apathetic" sleep variation interaction with mother recommend abstinence from alcohol, education on signs/symptoms of alcohol withdrawal, encouraged dad to secure access to sharps.

## 2019-05-13 NOTE — ED BEHAVIORAL HEALTH ASSESSMENT NOTE - HPI (INCLUDE ILLNESS QUALITY, SEVERITY, DURATION, TIMING, CONTEXT, MODIFYING FACTORS, ASSOCIATED SIGNS AND SYMPTOMS)
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    Pt vague/guarded on interview. States that he does not remember most of the events from yesterday due to intoxication. He does not recall any stressors/triggers from yesterday and states that he just "wanted to get drunk." He drank 2 four locos and cut himself while intoxicated. He cannot name a reason for cutting himself, states it is just something he does impulsively. He denies suicidal intent. He states he may have had some passing SI yesterday while intoxicated without plan/intent. Denies current SI. Denies current urges to cut. States he is "indifferent" to living. Mood is "apathetic" (states it has been this way for the past year). States he was depressed 1 year ago but has not felt depressed since. States that if he has worsening SI with plan to harm himself he will call 911, or call suicide hotline, or come to ED.    Denies recent depression. Cannot name much that gives him pleasure. Denies low energy/poor concentration. Has had somewhat worsened sleep schedule lately, unsure why. Denies AVH/paranoid delusions/IOR. Denies hx of peter.     See  note for collateral information.

## 2019-05-13 NOTE — ED BEHAVIORAL HEALTH NOTE - BEHAVIORAL HEALTH NOTE
Attempted to evaluate patient but remains sedated and although arousable unable to stay awake for evaluation.  Patient briefly explains that he was "too drunk" and that he "cut" himself but denies suicidal intent.  Patient requires complete psychiatric evaluation, please contact psychiatry when patient is able to participate in evaluation.

## 2019-05-13 NOTE — ED ADULT NURSE NOTE - OBJECTIVE STATEMENT
19 yo M received to room 8 for etoh intox, pt admits to SI, denies HI/AV/VH, active superficial lacerations to b/l forearms, no active bleeding at this time, pt admits to feeling depressed and has a hx depression, anxiety, multiple suicide attempts and BPD, pt father at bedside, PCA at bedside for CO 1:1, will closely monitor 17 yo M received to room 8 for etoh intox, pt admits to SI, denies HI/AV/VH, active superficial lacerations to b/l forearms, no active bleeding at this time, pt admits to feeling depressed, causing him to drink 2 four lokos and self harm, hx depression, anxiety, multiple suicide attempts and BPD, pt calm and cooperative at this time, pt father at bedside, PCA at bedside for CO 1:1, will closely monitor

## 2019-05-13 NOTE — ED BEHAVIORAL HEALTH ASSESSMENT NOTE - RISK ASSESSMENT
Chronic risk factors: hx active and passive SI (no intent or plan), hx NSSIB of cutting, hx binge drinking, BPD, family history of psychiatric illness, hx depression, male gender    Acute risk factors: recent NSSIB of cutting, access to knives, recent binge drinking episode, anhedonia, poor social supports, impulsivity, not currently engaged in school/work, intermittently nonadherent to outpatient tx    Protective factors: able to safety plan, no current SIIP/HIIP, no family hx suicide, no preparatory acts for suicide, identifies coping skills to distract from cutting (eating, listening to music), in a relationship, stable domicile, supportive father, engaged in outpatient treatment, no hx of psychosis    Although pt has multiple risk factors, pt has no active SI/HI at this time and is able to safety plan. He has no hx of suicide attempts or preparatory acts for suicide. He is not at imminent risk of harm to self or others at this time and does not meet criteria for inpatient admission. Chronic risk factors: hx active and passive SI (no intent or plan), hx NSSIB of cutting, hx binge drinking, BPD, family history of psychiatric illness, hx depression, male gender    Acute risk factors: recent NSSIB of cutting, access to knives, recent binge drinking episode, anhedonia, poor social supports, impulsivity, not currently engaged in school/work, intermittently nonadherent to outpatient tx    Protective factors: able to safety plan, no current SIIP/HIIP, no family hx suicide, no preparatory acts for suicide, identifies coping skills to distract from cutting (eating, listening to music), in a relationship, stable domicile, supportive father, engaged in outpatient treatment, no hx of psychosis    Although pt has multiple risk factors, pt has no active SI/HI at this time and is able to safety plan. He has no hx of suicide attempts or preparatory acts for suicide. He is not at imminent risk of harm to self or others at this time and does not meet criteria for involuntary inpatient admission.

## 2019-05-13 NOTE — ED PROVIDER NOTE - CONSTITUTIONAL, MLM
normal... Well appearing, well nourished, awake, alert, oriented to person, place, time/situation and in no apparent distress. Clinically intoxicated.
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
## 2019-05-17 DIAGNOSIS — F32.9 MAJOR DEPRESSIVE DISORDER, SINGLE EPISODE, UNSPECIFIED: ICD-10-CM

## 2020-01-23 ENCOUNTER — EMERGENCY (EMERGENCY)
Facility: HOSPITAL | Age: 20
LOS: 1 days | Discharge: HOME | End: 2020-01-23
Attending: EMERGENCY MEDICINE
Payer: COMMERCIAL

## 2020-01-23 VITALS
OXYGEN SATURATION: 100 % | HEART RATE: 95 BPM | SYSTOLIC BLOOD PRESSURE: 135 MMHG | RESPIRATION RATE: 17 BRPM | DIASTOLIC BLOOD PRESSURE: 87 MMHG

## 2020-01-23 LAB
ALBUMIN SERPL ELPH-MCNC: 4.4 G/DL — SIGNIFICANT CHANGE UP (ref 3.3–5)
ALP SERPL-CCNC: 44 U/L — SIGNIFICANT CHANGE UP (ref 40–120)
ALT FLD-CCNC: 22 U/L — SIGNIFICANT CHANGE UP (ref 10–45)
ANION GAP SERPL CALC-SCNC: 14 MMOL/L — SIGNIFICANT CHANGE UP (ref 5–17)
APAP SERPL-MCNC: <15 UG/ML — SIGNIFICANT CHANGE UP (ref 10–30)
AST SERPL-CCNC: 25 U/L — SIGNIFICANT CHANGE UP (ref 10–40)
BASOPHILS # BLD AUTO: 0.02 K/UL — SIGNIFICANT CHANGE UP (ref 0–0.2)
BASOPHILS NFR BLD AUTO: 0.3 % — SIGNIFICANT CHANGE UP (ref 0–2)
BILIRUB SERPL-MCNC: 0.5 MG/DL — SIGNIFICANT CHANGE UP (ref 0.2–1.2)
BUN SERPL-MCNC: 8 MG/DL — SIGNIFICANT CHANGE UP (ref 7–23)
CALCIUM SERPL-MCNC: 8.6 MG/DL — SIGNIFICANT CHANGE UP (ref 8.4–10.5)
CHLORIDE SERPL-SCNC: 105 MMOL/L — SIGNIFICANT CHANGE UP (ref 96–108)
CO2 SERPL-SCNC: 22 MMOL/L — SIGNIFICANT CHANGE UP (ref 22–31)
CREAT SERPL-MCNC: 1.05 MG/DL — SIGNIFICANT CHANGE UP (ref 0.5–1.3)
EOSINOPHIL # BLD AUTO: 0.04 K/UL — SIGNIFICANT CHANGE UP (ref 0–0.5)
EOSINOPHIL NFR BLD AUTO: 0.6 % — SIGNIFICANT CHANGE UP (ref 0–6)
ETHANOL SERPL-MCNC: 286 MG/DL — HIGH (ref 0–10)
GLUCOSE SERPL-MCNC: 95 MG/DL — SIGNIFICANT CHANGE UP (ref 70–99)
HCT VFR BLD CALC: 42.6 % — SIGNIFICANT CHANGE UP (ref 39–50)
HGB BLD-MCNC: 14.1 G/DL — SIGNIFICANT CHANGE UP (ref 13–17)
IMM GRANULOCYTES NFR BLD AUTO: 0.3 % — SIGNIFICANT CHANGE UP (ref 0–1.5)
LYMPHOCYTES # BLD AUTO: 2.21 K/UL — SIGNIFICANT CHANGE UP (ref 1–3.3)
LYMPHOCYTES # BLD AUTO: 31.3 % — SIGNIFICANT CHANGE UP (ref 13–44)
MCHC RBC-ENTMCNC: 29.7 PG — SIGNIFICANT CHANGE UP (ref 27–34)
MCHC RBC-ENTMCNC: 33.1 GM/DL — SIGNIFICANT CHANGE UP (ref 32–36)
MCV RBC AUTO: 89.7 FL — SIGNIFICANT CHANGE UP (ref 80–100)
MONOCYTES # BLD AUTO: 0.33 K/UL — SIGNIFICANT CHANGE UP (ref 0–0.9)
MONOCYTES NFR BLD AUTO: 4.7 % — SIGNIFICANT CHANGE UP (ref 2–14)
NEUTROPHILS # BLD AUTO: 4.44 K/UL — SIGNIFICANT CHANGE UP (ref 1.8–7.4)
NEUTROPHILS NFR BLD AUTO: 62.8 % — SIGNIFICANT CHANGE UP (ref 43–77)
NRBC # BLD: 0 /100 WBCS — SIGNIFICANT CHANGE UP (ref 0–0)
PLATELET # BLD AUTO: 221 K/UL — SIGNIFICANT CHANGE UP (ref 150–400)
POTASSIUM SERPL-MCNC: 4.1 MMOL/L — SIGNIFICANT CHANGE UP (ref 3.5–5.3)
POTASSIUM SERPL-SCNC: 4.1 MMOL/L — SIGNIFICANT CHANGE UP (ref 3.5–5.3)
PROT SERPL-MCNC: 7.1 G/DL — SIGNIFICANT CHANGE UP (ref 6–8.3)
RBC # BLD: 4.75 M/UL — SIGNIFICANT CHANGE UP (ref 4.2–5.8)
RBC # FLD: 13 % — SIGNIFICANT CHANGE UP (ref 10.3–14.5)
SALICYLATES SERPL-MCNC: <2 MG/DL — LOW (ref 15–30)
SODIUM SERPL-SCNC: 141 MMOL/L — SIGNIFICANT CHANGE UP (ref 135–145)
WBC # BLD: 7.06 K/UL — SIGNIFICANT CHANGE UP (ref 3.8–10.5)
WBC # FLD AUTO: 7.06 K/UL — SIGNIFICANT CHANGE UP (ref 3.8–10.5)

## 2020-01-23 PROCEDURE — 90715 TDAP VACCINE 7 YRS/> IM: CPT

## 2020-01-23 PROCEDURE — 99285 EMERGENCY DEPT VISIT HI MDM: CPT | Mod: 25

## 2020-01-23 PROCEDURE — 85027 COMPLETE CBC AUTOMATED: CPT

## 2020-01-23 PROCEDURE — 90471 IMMUNIZATION ADMIN: CPT

## 2020-01-23 PROCEDURE — 93005 ELECTROCARDIOGRAM TRACING: CPT | Mod: XU

## 2020-01-23 PROCEDURE — 80053 COMPREHEN METABOLIC PANEL: CPT

## 2020-01-23 PROCEDURE — 12007 RPR S/N/AX/GEN/TRNK >30.0 CM: CPT

## 2020-01-23 PROCEDURE — 80307 DRUG TEST PRSMV CHEM ANLYZR: CPT

## 2020-01-23 PROCEDURE — 93010 ELECTROCARDIOGRAM REPORT: CPT | Mod: NC,59

## 2020-01-23 RX ORDER — TETANUS TOXOID, REDUCED DIPHTHERIA TOXOID AND ACELLULAR PERTUSSIS VACCINE, ADSORBED 5; 2.5; 8; 8; 2.5 [IU]/.5ML; [IU]/.5ML; UG/.5ML; UG/.5ML; UG/.5ML
0.5 SUSPENSION INTRAMUSCULAR ONCE
Refills: 0 | Status: COMPLETED | OUTPATIENT
Start: 2020-01-23 | End: 2020-01-23

## 2020-01-23 RX ADMIN — TETANUS TOXOID, REDUCED DIPHTHERIA TOXOID AND ACELLULAR PERTUSSIS VACCINE, ADSORBED 0.5 MILLILITER(S): 5; 2.5; 8; 8; 2.5 SUSPENSION INTRAMUSCULAR at 19:35

## 2020-01-23 NOTE — ED ADULT NURSE NOTE - OBJECTIVE STATEMENT
19y old male w/ hx of past suicidal attempts and depression BIB EMS for suicidal attempt at home today. As per EMS patient barricaded door, was found cutting himself, plastic bag overhead trying to suffocate himself. Patient is very upset states "I do not want to live anymore", endorsees drinking full bottle of Gin today, denies current drug use, denies AH, VH, TH, homicidal ideation. Patient is 19y old male w/ hx of past suicidal attempts and depression BIB EMS for suicidal attempt at home today. As per EMS patient barricaded door, was found cutting himself, plastic bag overhead trying to suffocate himself. Patient is very upset states "I do not want to live anymore", endorsees drinking full bottle of Gin today, denies current drug use, denies AH, VH, TH, homicidal ideation. Patient is very upset, uncooperative, A&Ox3, 4 lacerations present on left arm and 2 lacerations present on right arm, multiple old scars from cutting present on both arms.

## 2020-01-23 NOTE — ED PROVIDER NOTE - PROGRESS NOTE DETAILS
ATTG: : patient endorsed to me by Dr. Smith. awaiting transport to [facility]. accepted and transportation here. mother at the bedside would like to travel with adult patient.

## 2020-01-23 NOTE — ED PROVIDER NOTE - CLINICAL SUMMARY MEDICAL DECISION MAKING FREE TEXT BOX
Patient presenting SI attempt with multiple lacerations to b/l arms. WIll repair lacerations, psych consult, 1:1 obs

## 2020-01-23 NOTE — ED PROVIDER NOTE - NS ED ROS FT
Limited ROS due to patient not cooperative  Refer to HPI  SKIN: lacerations to b/l arms  PSYCH: depression, SI

## 2020-01-23 NOTE — ED ADULT NURSE REASSESSMENT NOTE - NS ED NURSE REASSESS COMMENT FT1
Report received from Kwasi JONES. Aox3 ,speaking in complete sentences. Unlabored, spontaneous respirations, NAD. 1:1 at bedside for direct observation of pt. MD Barraza at bedside for laceration repairs. Report received from Kwasi JONES. Aox3 ,speaking in complete sentences. Unlabored, spontaneous respirations, NAD. 1:1 at bedside for direct observation of pt. MD Barraza at bedside for laceration repairs. Awaiting urine sample, pt aware.

## 2020-01-23 NOTE — ED PROVIDER NOTE - ATTENDING CONTRIBUTION TO CARE
attending Syl: 19yM h/o depression presents after suicide attempt. Patient with multiple self-inflicted lacerations to b/l arms. Patient also noted by EMS to have a plastic bag over his head which patient states he did to try to suffocate himself. Patient not cooperative with history. Appears intoxicated on arrival. Will place on constant observation, labs, psych eval

## 2020-01-23 NOTE — ED PROVIDER NOTE - PHYSICAL EXAMINATION
GEN: NAD, awake, agitated   HEENT: NCAT, MMM, normal conjunctiva, perrl  CHEST/LUNGS: Non-tachypneic, CTAB, bilateral breath sounds  CARDIAC: Non-tachycardic, s1s2, normal perfusion, no peripheral edema  ABDOMEN: Soft, NTND, No rebound/guarding  MSK: No joint tenderness, no gross deformity of extremities  SKIN: multiple lacerations to bilateral arms, no active bleeding   NEURO: moving all extremities, no focal motor or sensory deficits  PSYCH: agitated, endorsing SI

## 2020-01-23 NOTE — ED PROVIDER NOTE - OBJECTIVE STATEMENT
19M with pmh depression presenting with SI attempt. Patient with multiple self induced laceration injuries to b/l arms. Patient also noted by EMS to have a plastic bag over his head which patient states he did to try to suffocate himself. 19M with pmh depression presenting with SI attempt. Patient with multiple self induced laceration injuries to b/l arms. Patient also noted by EMS to have a plastic bag over his head which patient states he did to try to suffocate himself. Patient not cooperative with history. Denies any other acute complaints.

## 2020-01-23 NOTE — ED BEHAVIORAL HEALTH NOTE - BEHAVIORAL HEALTH NOTE
Pt was BIB [facility] after pt spoke to his friend ( HENRY FRANCO/ phone number [phone]) and reports  pt possible  suicide attempt. Pt arrived pleasant but hesitant to provide personal information. According to EMS, when they arrived at the patients home, his uncle was unaware of incident. Pt barricaded the door to his apartment and PD had to be called gain  entry. Pt was found lethargic, bleeding bilaterally from his forearms. They also report that he may have been bleeding for a while because "there was a lot of blood" observed on the floor. They also found an empty  a bottle of gin  on the floor ( pt is assumed to be intoxicated PTA) , he reports that he also took pills but EMS schulte not know what pills and they did not find any pill containers around pt.  Pt refuses to disclose other pertinent psychiatric history and will not give names of family members for RN to contact for collateral  information at this time. Pt is currently sleeping in  room B and is under constant supervision for suicide attempt

## 2020-01-24 ENCOUNTER — INPATIENT (INPATIENT)
Facility: HOSPITAL | Age: 20
LOS: 18 days | Discharge: HOME | End: 2020-02-12
Attending: PSYCHIATRY & NEUROLOGY | Admitting: PSYCHIATRY & NEUROLOGY
Payer: COMMERCIAL

## 2020-01-24 VITALS — TEMPERATURE: 99 F

## 2020-01-24 VITALS
OXYGEN SATURATION: 100 % | HEART RATE: 79 BPM | SYSTOLIC BLOOD PRESSURE: 117 MMHG | TEMPERATURE: 100 F | RESPIRATION RATE: 17 BRPM | DIASTOLIC BLOOD PRESSURE: 78 MMHG

## 2020-01-24 DIAGNOSIS — F10.10 ALCOHOL ABUSE, UNCOMPLICATED: ICD-10-CM

## 2020-01-24 DIAGNOSIS — F32.9 MAJOR DEPRESSIVE DISORDER, SINGLE EPISODE, UNSPECIFIED: ICD-10-CM

## 2020-01-24 DIAGNOSIS — F33.2 MAJOR DEPRESSIVE DISORDER, RECURRENT SEVERE WITHOUT PSYCHOTIC FEATURES: ICD-10-CM

## 2020-01-24 PROBLEM — F60.3 BORDERLINE PERSONALITY DISORDER: Chronic | Status: ACTIVE | Noted: 2019-05-13

## 2020-01-24 PROCEDURE — 99222 1ST HOSP IP/OBS MODERATE 55: CPT

## 2020-01-24 PROCEDURE — 90792 PSYCH DIAG EVAL W/MED SRVCS: CPT | Mod: GT

## 2020-01-24 RX ORDER — ESCITALOPRAM OXALATE 10 MG/1
15 TABLET, FILM COATED ORAL DAILY
Refills: 0 | Status: DISCONTINUED | OUTPATIENT
Start: 2020-01-24 | End: 2020-01-28

## 2020-01-24 RX ORDER — FOLIC ACID 0.8 MG
1 TABLET ORAL DAILY
Refills: 0 | Status: COMPLETED | OUTPATIENT
Start: 2020-01-24 | End: 2020-01-31

## 2020-01-24 RX ORDER — THIAMINE MONONITRATE (VIT B1) 100 MG
100 TABLET ORAL DAILY
Refills: 0 | Status: COMPLETED | OUTPATIENT
Start: 2020-01-24 | End: 2020-01-27

## 2020-01-24 NOTE — ED PROCEDURE NOTE - PROCEDURE ADDITIONAL DETAILS
6 Total lacerations repaired  Right arm: laceration#1: 7cm, 6 sutures total, nylon 4.0, laceration #2: 7cm, 6 sutures total nylon 4.0  Left arm: laceration #3: 2cm, 3 sutures total, nylon 4.0, laceration #4: 3cm, 4 sutures total, nylon 4.0, laceration #5: 6cm, 5 sutures total, 4.0 nylon, laceration #6: 8cm, 7 sutures total, nylon 4.0 x4, vicryl 3.0 x 3

## 2020-01-24 NOTE — ED BEHAVIORAL HEALTH ASSESSMENT NOTE - DESCRIPTION (FIRST USE, LAST USE, QUANTITY, FREQUENCY, DURATION)
periodic binge drinking ~1x/month quit smoking in November binge drinking of half to whole bottle of hard liquor every 2 weeks

## 2020-01-24 NOTE — ED BEHAVIORAL HEALTH ASSESSMENT NOTE - SUMMARY
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 Patient is a 19yo single  male, domiciled with family, unemployed, non-caregiver. with past psych hx of depression, borderline PD, alcohol abuse, at least 2 prior hospitalizations, most recent Nov 2019, current outpatient tx with Dr. Hackett, hx of NSSIB but no prior SAs, no hx of aggressive or violent behavior, currently binge drinking alcohol ~1x/month, no history of detox/rehab or withdrawal symptoms, no legal issues, with no known past medical hx. Patient is BIBEMS post suicide attempt of cutting arm with razor in setting of alcohol intoxication. Numerous deep scars and new lacerations with stitches seen on his arms.     Patient describes persistent apathetic mood, NSSIB, binge drinking, and inability to work or go to school as his baseline for the past few years. He has been cutting since HS and binge drinking a half or whole bottle of liquor every 2 weeks. In the last week or two his sx worsened triggered by an undisclosed incident with a girl. Since then, he has been feeling more empty and apathetic, depressed, with low energy and appetite, dysregulated sleep, isolating to his bed, and not interacting with others. This led to his first reported suicide attempt a day or two ago, in which he put a plastic bag over his head, cut his arm deeply with a razor, and drank a bottle of gin. EMS was alerted by his friend and family who found patient with slits on his wrist, a plastic bag over his head and duct tape close by. He feels indifferent about being alive and his SA. He denies any current suicidality, homocidality, psychotic or manic sx. He reports being dx MDD and borderline last year tx with lexapro unsuccessfully. At this time, patient opposes inpatient admission, however due to gravity of recent suicide attempt, high acute suicide risk, and elevated chronic risk, he is still at imminent risk of harm to himself and meets cirteria for involuntary inpatient admission. Family is aware and in agreement.

## 2020-01-24 NOTE — ED BEHAVIORAL HEALTH ASSESSMENT NOTE - RISK ASSESSMENT
Chronic risk factors: hx active and passive SI (no intent or plan), hx NSSIB of cutting, hx binge drinking, BPD, family history of psychiatric illness, hx depression, male gender    Acute risk factors: recent NSSIB of cutting, access to knives, recent binge drinking episode, anhedonia, poor social supports, impulsivity, not currently engaged in school/work, intermittently nonadherent to outpatient tx    Protective factors: able to safety plan, no current SIIP/HIIP, no family hx suicide, no preparatory acts for suicide, identifies coping skills to distract from cutting (eating, listening to music), in a relationship, stable domicile, supportive father, engaged in outpatient treatment, no hx of psychosis    Although pt has multiple risk factors, pt has no active SI/HI at this time and is able to safety plan. He has no hx of suicide attempts or preparatory acts for suicide. He is not at imminent risk of harm to self or others at this time and does not meet criteria for involuntary inpatient admission. High Acute Suicide Risk Chronic risk factors: hx active and passive SI (no intent or plan), hx NSSIB of cutting, hx binge drinking, BPD, family history of psychiatric illness, hx depression, male gender    Acute risk factors: , recent suicide attempt, recent NSSIB of cutting, access to knives, recent binge drinking episode, anhedonia, poor social supports, impulsivity, not currently engaged in school/work, intermittently nonadherent to outpatient tx    Protective factors: no family hx suicide, identifies coping skills to distract from cutting (eating, listening to music), stable domicile, supportive father, engaged in outpatient treatment, no hx of psychosis    He is at imminent risk of harm to self or others at this time and meets criteria for involuntary inpatient admission.

## 2020-01-24 NOTE — ED BEHAVIORAL HEALTH ASSESSMENT NOTE - DESCRIPTION
Pt initially sedated 2/2 intoxication. On awakening patient was calm and cooperative. Patient was not aggressive or violent and did not require PRN medications.    T(C): 37 (05-13-19 @ 07:55), Max: 37 (05-13-19 @ 07:55)  HR: 86 (05-13-19 @ 16:23) (80 - 86)  BP: 123/69 (05-13-19 @ 16:23) (123/69 - 128/85)  RR: 18 (05-13-19 @ 16:23) (16 - 18)  SpO2: 99% (05-13-19 @ 16:23) (99% - 100%)  Wt(kg): -- none pt has girlfriend. lives with father and grandmother. not currently employed or in school. poor relationship with mother per records See BH note    Vital Signs Last 24 Hrs  T(C): 36.7 (24 Jan 2020 05:22), Max: 37.5 (23 Jan 2020 19:40)  T(F): 98.1 (24 Jan 2020 05:22), Max: 99.5 (23 Jan 2020 19:40)  HR: 98 (24 Jan 2020 05:22) (90 - 98)  BP: 115/69 (24 Jan 2020 05:22) (107/65 - 135/87)  BP(mean): --  RR: 18 (24 Jan 2020 05:22) (17 - 18)  SpO2: 97% (24 Jan 2020 05:22) (96% - 100%) lives with father, uncle and grandmother. not currently employed or in school. poor relationship with mother per records. Finished HS.

## 2020-01-24 NOTE — ED BEHAVIORAL HEALTH ASSESSMENT NOTE - HPI (INCLUDE ILLNESS QUALITY, SEVERITY, DURATION, TIMING, CONTEXT, MODIFYING FACTORS, ASSOCIATED SIGNS AND SYMPTOMS)
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    Pt vague/guarded on interview. States that he does not remember most of the events from yesterday due to intoxication. He does not recall any stressors/triggers from yesterday and states that he just "wanted to get drunk." He drank 2 four locos and cut himself while intoxicated. He cannot name a reason for cutting himself, states it is just something he does impulsively. He denies suicidal intent. He states he may have had some passing SI yesterday while intoxicated without plan/intent. Denies current SI. Denies current urges to cut. States he is "indifferent" to living. Mood is "apathetic" (states it has been this way for the past year). States he was depressed 1 year ago but has not felt depressed since. States that if he has worsening SI with plan to harm himself he will call 911, or call suicide hotline, or come to ED.    Denies recent depression. Cannot name much that gives him pleasure. Denies low energy/poor concentration. Has had somewhat worsened sleep schedule lately, unsure why. Denies AVH/paranoid delusions/IOR. Denies hx of peter.     See  note for collateral information. Patient is a 19yo single  male, domiciled with family, unemployed, non-caregiver. with past psych hx of depression, borderline PD, alcohol abuse, at least 2 prior hospitalizations, most recent Nov 2019, current outpatient tx with Dr. Hackett, hx of NSSIB but no prior SAs, no hx of aggressive or violent behavior, currently binge drinking alcohol ~1x/month, no history of detox/rehab or withdrawal symptoms, no legal issues, with no known past medical hx. Patient is BIBEMS post suicide attempt of cutting arm with razor in setting of alcohol intoxication. During evaluation, patient was calm, cooperative, with linear and logical process, but vague and apathetic at times. Numerous deep scars and new lacerations with stitches seen on his arms.     Patient describes persistent apathetic mood, NSSIB, and binge drinking as his baseline for the past few years. He has been unable to work or maintain school. He was at his usual low baseline when his sx worsened in the last week or two. He explains an incident occurred involving a girl, but was unwilling to elaborate further. Since then, he has been feeling more empty and apathetic, depressed, low energy, eating less, dysregulated sleep, isolating to his bed, not interacting with others. This culminated in a suicide attempt a day or two ago, when he put a plastic bag over his head, cut his arm deeply with a razor, and drank a bottle of gin. Patient had previously told his friend his intentions, who then called 911. He states this was his first suicide attempt although he has been cutting since his last year of HS. He feels indifferent reflecting back on attempt and being alive. Patient denies any current active SI/P/I, HI/P/I, aggressive I/P/I. No AH/VH/paranoia or delusions, no s/sx of peter. He does admit to binge drinking every couple of weeks about half or whole bottle of gin/hard alcohol. Patient reports being dx with MDD and borderline last year, with current outpt tx on lexapro 15mg for past 1 to 2 months. At this time, patient opposes inpatient admission.    Collateral obtained from Mitzilynnette Vazquez, mother, with good reliability and daily contact. Collateral notes that yesterday evening she received a phone call from her older son, stating that patients friend, sent him a message on [employer], reporting that patient significantly hurt himself. Brother then activated EMS to the home. Upon arrival, patient was found with slits on his wrist, a plastic bag over his head and duct tape close by. Collateral reports that she has concerns about patient’s depressed mood and suicide attempt, and thinks she would benefit from additional psychiatric care.

## 2020-01-24 NOTE — ED BEHAVIORAL HEALTH ASSESSMENT NOTE - PSYCHIATRIC ISSUES AND PLAN (INCLUDE STANDING AND PRN MEDICATION)
Continue lexapro 15mg daily; as per primary inpatient team; PRNS: haldol 5mg, ativan 2mg, diphenhydramine 50mg, PO/IM, Q6H for Agitation

## 2020-01-24 NOTE — CHART NOTE - NSCHARTNOTEFT_GEN_A_CORE
ED SW: Chart reviewed for psych transfer. Patient presented to the ED s/p suicide attempt by cutting self with a razor. Patient evaluated by [facility] Psych and recommended for inpatient psych hospitalization. Admitting dx: MDD. Legal Status: Involuntary. LMSW contacted [facility] and spoke to Erika (ext. [phone]). Copy of EKG and Legals faxed to [phone]. Patient accepted and assigned to LOW 3 with Dr. Daniels. ED Medicine informed and reports patient is medically cleared for psych transfer. [facility] Psych informed and will provide report to [facility] MD on LOW 3. [facility] email sent with transfer details.  RN to provide report and contact [facility] to arrange BLS transport to [facility] LOW 3.    LMSW met with patient and family at bedside. Mother provided and accepted [facility] contact information. Patient with [employer] insurance benefits. [facility] colleague to follow-up with authorization.

## 2020-01-24 NOTE — ED ADULT NURSE REASSESSMENT NOTE - NS ED NURSE REASSESS COMMENT FT1
Pt sleeping in stretcher, family at bedside. Unlabored, spontaneous respirations, NAD. 1:1 at bedside for direct observation of pt.

## 2020-01-24 NOTE — ED BEHAVIORAL HEALTH NOTE - BEHAVIORAL HEALTH NOTE
===================  PRE-HOSPITAL COURSE  ===================  SOURCE:  RN/Triage documentation.   DETAILS:  Patient was BIBEMS/PD after suicide attempt; patient reportedly drank, took, pills, and attempted to cut his right arm.     ============  ED COURSE   ============  SOURCE:  RN/Triage documentation.   ARRIVAL:  Patient was compliant with hospital staff and presented with fair hygiene; as per note there was blood due to patient cutting his right arm (sutures were needed for wounds).   BELONGINGS:  Secured by security, patients room is cleared.   BEHAVIOR: Patient was compliant with triage process and provided blood for routine labs, however urine hasn't been collected as of yet. Patient was gowned/wanded with no reported incident. Patient's speech is of low volume accompanied by a normal rate; patient is reportedly AOx4. Patient endorsed that injuries were a suicide attempt; endorsed SI to initial nurse however denying to present nurse. Patient denies HI/AH/VH. Patient has eaten and drank while in ER and has been resting in bed/watching TV during his stay. Patient has been interacting positively with ER staff. Mother and sibling are reported to be at bedside; interactions between the two are reportedly minimal.   TREATMENT:  Patient required sutures to repair lacerations to right arm; patient also received a tdap shot while in ED.

## 2020-01-24 NOTE — ED ADULT NURSE REASSESSMENT NOTE - NS ED NURSE REASSESS COMMENT FT1
Pt watching television in stretcher, unlabored, spontaneous respirations, NAD. 1:1 at bedside for direct observation of pt at this time.

## 2020-01-24 NOTE — ED BEHAVIORAL HEALTH ASSESSMENT NOTE - AXIS IV
Problems with primary support/Problem related to social environment Occupational problems/Problems with primary support/Problem related to social environment

## 2020-01-24 NOTE — ED BEHAVIORAL HEALTH ASSESSMENT NOTE - SUICIDE RISK FACTORS
Anhedonia/Other/Alcohol/Substance abuse disorders/History of abuse/trauma Alcohol/Substance abuse disorders/Hopelessness or despair/Anhedonia/Current mood episode/History of abuse/trauma/Impulsivity

## 2020-01-24 NOTE — ED BEHAVIORAL HEALTH ASSESSMENT NOTE - NS ED [facility] ED COURSE UTILIZATION OF 1 TO 1 IN ED YN
"Tanya Saldana is a 32 y.o. female here today for a postop visit after undergoing LEEP on 10/31 for cervical dysplasia.  She has been doing well postoperatively since her procedure and denies any fevers, vaginal bleeding, or pain.  Her pathology report returned showing moderate dysplasia with clear margins.    /72 (BP Location: Left arm, Patient Position: Sitting)   Ht 161 cm (63.39\")   Wt 71.2 kg (157 lb)   LMP 10/31/2018   BMI 27.47 kg/m²    In general pleasant female in no acute distress  Abdomen is soft nontender nondistended  Her cervix is healing well    Assessment: Normal postoperative visit after LEEP    Tanya Saldana will return in 1 year for a Pap smear.  We have discussed the benign pathology report and the findings of clear margins.  In the meantime if she has questions or problems she will call the office.   "
Please advise    Thanks,  Rem[place]  
No

## 2020-01-24 NOTE — ED BEHAVIORAL HEALTH ASSESSMENT NOTE - DIFFERENTIAL
Borderline Personality d/o  r/o depression MDD vs Borderline Personality d/o vs alcohol use disorder

## 2020-01-24 NOTE — ED BEHAVIORAL HEALTH ASSESSMENT NOTE - OTHER PAST PSYCHIATRIC HISTORY (INCLUDE DETAILS REGARDING ONSET, COURSE OF ILLNESS, INPATIENT/OUTPATIENT TREATMENT)
PPH BPD. Patient has no history of suicide attempts. Sees Dr. Schreiber in [facility]. dx with MDD, borderline last year. at least 2 past hospitalizations, currently in outpatient tx with dr. riojas, unable to confirm psychiatrist name.

## 2020-01-25 PROCEDURE — 99232 SBSQ HOSP IP/OBS MODERATE 35: CPT

## 2020-01-25 RX ADMIN — ESCITALOPRAM OXALATE 15 MILLIGRAM(S): 10 TABLET, FILM COATED ORAL at 10:12

## 2020-01-25 RX ADMIN — Medication 1 MILLIGRAM(S): at 10:12

## 2020-01-25 RX ADMIN — Medication 1 TABLET(S): at 10:12

## 2020-01-25 RX ADMIN — Medication 100 MILLIGRAM(S): at 10:12

## 2020-01-25 NOTE — CHART NOTE - NSCHARTNOTEFT_GEN_A_CORE
Screening Medical Evaluation  Patient Admitted from: [facility] ED    [facility] admitting diagnosis: Severe episode of recurrent major depressive disorder, without psychotic features    PAST MEDICAL & SURGICAL HISTORY:  Borderline personality disorder  Depression  No significant past surgical history        Allergies    No Known Allergies    Intolerances        Social History:     FAMILY HISTORY:  No pertinent family history in first degree relatives      MEDICATIONS  (STANDING):  escitalopram 15 milliGRAM(s) Oral daily  folic acid 1 milliGRAM(s) Oral daily  multivitamin 1 Tablet(s) Oral daily  thiamine 100 milliGRAM(s) Oral daily    MEDICATIONS  (PRN):  LORazepam     Tablet 2 milliGRAM(s) Oral every 2 hours PRN CIWA score increase by 2 points and current CIWA score GREATER THAN 9  LORazepam     Tablet 2 milliGRAM(s) Oral every 6 hours PRN anxiety  LORazepam   Injectable 2 milliGRAM(s) IntraMuscular once PRN severe agitation      Vital Signs Last 24 Hrs  T(C): 37.2 (24 Jan 2020 19:51), Max: 37.6 (24 Jan 2020 13:53)  T(F): 98.9 (24 Jan 2020 19:51), Max: 99.6 (24 Jan 2020 13:53)  HR: 79 (24 Jan 2020 13:53) (79 - 98)  BP: 117/78 (24 Jan 2020 13:53) (107/65 - 117/78)  BP(mean): --  RR: 17 (24 Jan 2020 13:53) (17 - 18)  SpO2: 100% (24 Jan 2020 13:53) (96% - 100%)  CAPILLARY BLOOD GLUCOSE            PHYSICAL EXAM:  GENERAL: NAD, well-developed  HEAD:  Atraumatic, Normocephalic  EYES: EOMI, PERRLA, conjunctiva and sclera clear  NECK: Supple  CHEST/LUNG: Clear to auscultation bilaterally; No wheeze  HEART: Regular rate and rhythm; No murmurs, rubs, or gallops  ABDOMEN: Soft, Nontender, Nondistended; Bowel sounds present  EXTREMITIES:  2+ Peripheral Pulses, No cyanosis, or edema  PSYCH: AAOx3  NEUROLOGY: non-focal  SKIN: See HPI.    LABS:                        14.1   7.06  )-----------( 221      ( 23 Jan 2020 17:01 )             42.6     01-23    141  |  105  |  8   ----------------------------<  95  4.1   |  22  |  1.05    Ca    8.6      23 Jan 2020 17:01    TPro  7.1  /  Alb  4.4  /  TBili  0.5  /  DBili  x   /  AST  25  /  ALT  22  /  AlkPhos  44  01-23              RADIOLOGY & ADDITIONAL TESTS:    Assessment and Plan:  19 year old male presenting today from [facility] ED to [facility] with admitting diagnosis of Severe episode of recurrent major depressive disorder, without psychotic features with no pertinent PMH. Pt has 3 sutures to R arm and 4 sutures to L arm after self cutting with razor. Pt denies any pain at this time. Site of sutures clean, no sign of infection noted. Denies any fever, chills, headache, pain, chest pain, SOB, abdominal pain, N/V/D/C, dysuria.   1) Severe episode of recurrent major depressive disorder, without psychotic features: Follow care plan as per primary team.  2) b/l arm laceration: Have sutures removed in 7-10 days. 2/3/2020 sutures to be removed.

## 2020-01-26 PROCEDURE — 99232 SBSQ HOSP IP/OBS MODERATE 35: CPT

## 2020-01-26 RX ADMIN — Medication 100 MILLIGRAM(S): at 08:55

## 2020-01-26 RX ADMIN — Medication 1 MILLIGRAM(S): at 08:55

## 2020-01-26 RX ADMIN — Medication 1 TABLET(S): at 08:55

## 2020-01-26 RX ADMIN — ESCITALOPRAM OXALATE 15 MILLIGRAM(S): 10 TABLET, FILM COATED ORAL at 08:55

## 2020-01-27 PROCEDURE — 99232 SBSQ HOSP IP/OBS MODERATE 35: CPT

## 2020-01-27 RX ORDER — NALTREXONE HYDROCHLORIDE 50 MG/1
50 TABLET, FILM COATED ORAL DAILY
Refills: 0 | Status: DISCONTINUED | OUTPATIENT
Start: 2020-01-27 | End: 2020-02-12

## 2020-01-27 RX ADMIN — ESCITALOPRAM OXALATE 15 MILLIGRAM(S): 10 TABLET, FILM COATED ORAL at 09:34

## 2020-01-27 RX ADMIN — NALTREXONE HYDROCHLORIDE 50 MILLIGRAM(S): 50 TABLET, FILM COATED ORAL at 17:39

## 2020-01-27 RX ADMIN — Medication 100 MILLIGRAM(S): at 09:34

## 2020-01-27 RX ADMIN — Medication 1 TABLET(S): at 09:34

## 2020-01-27 RX ADMIN — Medication 1 MILLIGRAM(S): at 09:34

## 2020-01-28 PROCEDURE — 99232 SBSQ HOSP IP/OBS MODERATE 35: CPT

## 2020-01-28 RX ORDER — SERTRALINE 25 MG/1
50 TABLET, FILM COATED ORAL DAILY
Refills: 0 | Status: DISCONTINUED | OUTPATIENT
Start: 2020-01-28 | End: 2020-01-30

## 2020-01-28 RX ORDER — ESCITALOPRAM OXALATE 10 MG/1
5 TABLET, FILM COATED ORAL DAILY
Refills: 0 | Status: DISCONTINUED | OUTPATIENT
Start: 2020-01-28 | End: 2020-01-30

## 2020-01-28 RX ADMIN — NALTREXONE HYDROCHLORIDE 50 MILLIGRAM(S): 50 TABLET, FILM COATED ORAL at 08:46

## 2020-01-28 RX ADMIN — Medication 1 TABLET(S): at 08:46

## 2020-01-28 RX ADMIN — Medication 1 MILLIGRAM(S): at 08:46

## 2020-01-28 RX ADMIN — ESCITALOPRAM OXALATE 15 MILLIGRAM(S): 10 TABLET, FILM COATED ORAL at 08:46

## 2020-01-29 PROCEDURE — 99232 SBSQ HOSP IP/OBS MODERATE 35: CPT

## 2020-01-29 RX ADMIN — ESCITALOPRAM OXALATE 5 MILLIGRAM(S): 10 TABLET, FILM COATED ORAL at 09:17

## 2020-01-29 RX ADMIN — NALTREXONE HYDROCHLORIDE 50 MILLIGRAM(S): 50 TABLET, FILM COATED ORAL at 09:17

## 2020-01-29 RX ADMIN — SERTRALINE 50 MILLIGRAM(S): 25 TABLET, FILM COATED ORAL at 09:17

## 2020-01-29 RX ADMIN — Medication 1 TABLET(S): at 09:17

## 2020-01-29 RX ADMIN — Medication 1 MILLIGRAM(S): at 09:17

## 2020-01-30 PROCEDURE — 99232 SBSQ HOSP IP/OBS MODERATE 35: CPT

## 2020-01-30 RX ORDER — SERTRALINE 25 MG/1
100 TABLET, FILM COATED ORAL DAILY
Refills: 0 | Status: DISCONTINUED | OUTPATIENT
Start: 2020-01-30 | End: 2020-02-03

## 2020-01-30 RX ADMIN — NALTREXONE HYDROCHLORIDE 50 MILLIGRAM(S): 50 TABLET, FILM COATED ORAL at 08:34

## 2020-01-30 RX ADMIN — Medication 1 TABLET(S): at 08:34

## 2020-01-30 RX ADMIN — Medication 1 MILLIGRAM(S): at 08:34

## 2020-01-30 RX ADMIN — ESCITALOPRAM OXALATE 5 MILLIGRAM(S): 10 TABLET, FILM COATED ORAL at 08:34

## 2020-01-30 RX ADMIN — SERTRALINE 50 MILLIGRAM(S): 25 TABLET, FILM COATED ORAL at 08:34

## 2020-01-31 PROCEDURE — 99232 SBSQ HOSP IP/OBS MODERATE 35: CPT

## 2020-01-31 RX ADMIN — NALTREXONE HYDROCHLORIDE 50 MILLIGRAM(S): 50 TABLET, FILM COATED ORAL at 10:15

## 2020-01-31 RX ADMIN — Medication 1 TABLET(S): at 10:15

## 2020-01-31 RX ADMIN — Medication 1 MILLIGRAM(S): at 10:15

## 2020-01-31 RX ADMIN — SERTRALINE 100 MILLIGRAM(S): 25 TABLET, FILM COATED ORAL at 10:15

## 2020-02-01 RX ADMIN — NALTREXONE HYDROCHLORIDE 50 MILLIGRAM(S): 50 TABLET, FILM COATED ORAL at 08:42

## 2020-02-01 RX ADMIN — SERTRALINE 100 MILLIGRAM(S): 25 TABLET, FILM COATED ORAL at 08:42

## 2020-02-02 RX ADMIN — SERTRALINE 100 MILLIGRAM(S): 25 TABLET, FILM COATED ORAL at 08:58

## 2020-02-02 RX ADMIN — NALTREXONE HYDROCHLORIDE 50 MILLIGRAM(S): 50 TABLET, FILM COATED ORAL at 08:58

## 2020-02-03 PROCEDURE — 99232 SBSQ HOSP IP/OBS MODERATE 35: CPT

## 2020-02-03 RX ORDER — SERTRALINE 25 MG/1
125 TABLET, FILM COATED ORAL DAILY
Refills: 0 | Status: DISCONTINUED | OUTPATIENT
Start: 2020-02-04 | End: 2020-02-06

## 2020-02-03 RX ORDER — ARIPIPRAZOLE 15 MG/1
5 TABLET ORAL DAILY
Refills: 0 | Status: DISCONTINUED | OUTPATIENT
Start: 2020-02-04 | End: 2020-02-06

## 2020-02-03 RX ADMIN — SERTRALINE 100 MILLIGRAM(S): 25 TABLET, FILM COATED ORAL at 08:49

## 2020-02-03 RX ADMIN — NALTREXONE HYDROCHLORIDE 50 MILLIGRAM(S): 50 TABLET, FILM COATED ORAL at 08:49

## 2020-02-04 PROCEDURE — 99232 SBSQ HOSP IP/OBS MODERATE 35: CPT

## 2020-02-04 RX ADMIN — SERTRALINE 125 MILLIGRAM(S): 25 TABLET, FILM COATED ORAL at 09:53

## 2020-02-04 RX ADMIN — NALTREXONE HYDROCHLORIDE 50 MILLIGRAM(S): 50 TABLET, FILM COATED ORAL at 09:53

## 2020-02-04 RX ADMIN — ARIPIPRAZOLE 5 MILLIGRAM(S): 15 TABLET ORAL at 09:53

## 2020-02-05 PROCEDURE — 99232 SBSQ HOSP IP/OBS MODERATE 35: CPT

## 2020-02-05 RX ADMIN — ARIPIPRAZOLE 5 MILLIGRAM(S): 15 TABLET ORAL at 08:54

## 2020-02-05 RX ADMIN — NALTREXONE HYDROCHLORIDE 50 MILLIGRAM(S): 50 TABLET, FILM COATED ORAL at 08:54

## 2020-02-05 RX ADMIN — SERTRALINE 125 MILLIGRAM(S): 25 TABLET, FILM COATED ORAL at 08:54

## 2020-02-06 PROCEDURE — 99232 SBSQ HOSP IP/OBS MODERATE 35: CPT

## 2020-02-06 RX ORDER — SERTRALINE 25 MG/1
150 TABLET, FILM COATED ORAL DAILY
Refills: 0 | Status: DISCONTINUED | OUTPATIENT
Start: 2020-02-07 | End: 2020-02-12

## 2020-02-06 RX ORDER — ARIPIPRAZOLE 15 MG/1
5 TABLET ORAL AT BEDTIME
Refills: 0 | Status: DISCONTINUED | OUTPATIENT
Start: 2020-02-07 | End: 2020-02-10

## 2020-02-06 RX ADMIN — NALTREXONE HYDROCHLORIDE 50 MILLIGRAM(S): 50 TABLET, FILM COATED ORAL at 08:53

## 2020-02-06 RX ADMIN — SERTRALINE 125 MILLIGRAM(S): 25 TABLET, FILM COATED ORAL at 08:53

## 2020-02-06 RX ADMIN — ARIPIPRAZOLE 5 MILLIGRAM(S): 15 TABLET ORAL at 08:53

## 2020-02-07 PROCEDURE — 99232 SBSQ HOSP IP/OBS MODERATE 35: CPT

## 2020-02-07 RX ADMIN — ARIPIPRAZOLE 5 MILLIGRAM(S): 15 TABLET ORAL at 20:17

## 2020-02-07 RX ADMIN — NALTREXONE HYDROCHLORIDE 50 MILLIGRAM(S): 50 TABLET, FILM COATED ORAL at 09:35

## 2020-02-07 RX ADMIN — SERTRALINE 150 MILLIGRAM(S): 25 TABLET, FILM COATED ORAL at 09:35

## 2020-02-08 RX ADMIN — ARIPIPRAZOLE 5 MILLIGRAM(S): 15 TABLET ORAL at 20:16

## 2020-02-08 RX ADMIN — NALTREXONE HYDROCHLORIDE 50 MILLIGRAM(S): 50 TABLET, FILM COATED ORAL at 08:51

## 2020-02-08 RX ADMIN — SERTRALINE 150 MILLIGRAM(S): 25 TABLET, FILM COATED ORAL at 08:51

## 2020-02-09 RX ADMIN — SERTRALINE 150 MILLIGRAM(S): 25 TABLET, FILM COATED ORAL at 08:48

## 2020-02-09 RX ADMIN — NALTREXONE HYDROCHLORIDE 50 MILLIGRAM(S): 50 TABLET, FILM COATED ORAL at 08:48

## 2020-02-09 RX ADMIN — ARIPIPRAZOLE 5 MILLIGRAM(S): 15 TABLET ORAL at 20:45

## 2020-02-10 PROCEDURE — 99232 SBSQ HOSP IP/OBS MODERATE 35: CPT

## 2020-02-10 RX ORDER — ARIPIPRAZOLE 15 MG/1
10 TABLET ORAL AT BEDTIME
Refills: 0 | Status: DISCONTINUED | OUTPATIENT
Start: 2020-02-10 | End: 2020-02-12

## 2020-02-10 RX ADMIN — NALTREXONE HYDROCHLORIDE 50 MILLIGRAM(S): 50 TABLET, FILM COATED ORAL at 08:56

## 2020-02-10 RX ADMIN — ARIPIPRAZOLE 10 MILLIGRAM(S): 15 TABLET ORAL at 20:15

## 2020-02-10 RX ADMIN — SERTRALINE 150 MILLIGRAM(S): 25 TABLET, FILM COATED ORAL at 08:56

## 2020-02-11 PROCEDURE — 99232 SBSQ HOSP IP/OBS MODERATE 35: CPT

## 2020-02-11 RX ADMIN — NALTREXONE HYDROCHLORIDE 50 MILLIGRAM(S): 50 TABLET, FILM COATED ORAL at 08:31

## 2020-02-11 RX ADMIN — SERTRALINE 150 MILLIGRAM(S): 25 TABLET, FILM COATED ORAL at 08:31

## 2020-02-11 RX ADMIN — ARIPIPRAZOLE 10 MILLIGRAM(S): 15 TABLET ORAL at 20:10

## 2020-02-12 VITALS — TEMPERATURE: 98 F

## 2020-02-12 PROCEDURE — 99238 HOSP IP/OBS DSCHRG MGMT 30/<: CPT

## 2020-02-12 RX ORDER — ARIPIPRAZOLE 15 MG/1
1 TABLET ORAL
Qty: 30 | Refills: 0
Start: 2020-02-12 | End: 2020-03-12

## 2020-02-12 RX ORDER — ESCITALOPRAM OXALATE 10 MG/1
15 TABLET, FILM COATED ORAL
Qty: 0 | Refills: 0 | DISCHARGE

## 2020-02-12 RX ORDER — SERTRALINE 25 MG/1
1 TABLET, FILM COATED ORAL
Qty: 30 | Refills: 0
Start: 2020-02-12 | End: 2020-03-12

## 2020-02-12 RX ORDER — NALTREXONE HYDROCHLORIDE 50 MG/1
1 TABLET, FILM COATED ORAL
Qty: 30 | Refills: 0
Start: 2020-02-12 | End: 2020-03-12

## 2020-02-12 RX ADMIN — SERTRALINE 150 MILLIGRAM(S): 25 TABLET, FILM COATED ORAL at 09:08

## 2020-02-12 RX ADMIN — NALTREXONE HYDROCHLORIDE 50 MILLIGRAM(S): 50 TABLET, FILM COATED ORAL at 09:08

## 2020-04-06 NOTE — ED BEHAVIORAL HEALTH ASSESSMENT NOTE - EYE CONTACT
Pt here today for Insertion of Nexplanon  UPT Result: negative  LMP: n/a  Current BCM:none  Phone #: [phone]  Pharmacy verified and confirmed     Poor Fair

## 2020-11-08 NOTE — ED PROVIDER NOTE - PMH
VASCULAR SURGERY    Very comfortable today. VeraFlow VAC is been working well with no further leaks.    Patient has no concerns or questions.    Plan VeraFlow dressing change at bedside with Dr Her we will decide whether the wound that is ready for split-thickness skin grafting or reapplication of VAC.         Eber Sam MD       Borderline personality disorder    Depression

## 2021-02-26 ENCOUNTER — EMERGENCY (EMERGENCY)
Facility: HOSPITAL | Age: 21
LOS: 0 days | Discharge: ANOTHER HOSPITAL | End: 2021-02-27
Attending: EMERGENCY MEDICINE
Payer: COMMERCIAL

## 2021-02-26 VITALS
DIASTOLIC BLOOD PRESSURE: 85 MMHG | OXYGEN SATURATION: 98 % | WEIGHT: 175.05 LBS | TEMPERATURE: 99 F | HEART RATE: 79 BPM | RESPIRATION RATE: 17 BRPM | HEIGHT: 72 IN | SYSTOLIC BLOOD PRESSURE: 162 MMHG

## 2021-02-26 DIAGNOSIS — Z00.8 ENCOUNTER FOR OTHER GENERAL EXAMINATION: ICD-10-CM

## 2021-02-26 DIAGNOSIS — R45.851 SUICIDAL IDEATIONS: ICD-10-CM

## 2021-02-26 DIAGNOSIS — F10.10 ALCOHOL ABUSE, UNCOMPLICATED: ICD-10-CM

## 2021-02-26 DIAGNOSIS — F32.9 MAJOR DEPRESSIVE DISORDER, SINGLE EPISODE, UNSPECIFIED: ICD-10-CM

## 2021-02-26 DIAGNOSIS — F60.3 BORDERLINE PERSONALITY DISORDER: ICD-10-CM

## 2021-02-26 DIAGNOSIS — R00.0 TACHYCARDIA, UNSPECIFIED: ICD-10-CM

## 2021-02-26 LAB
ALBUMIN SERPL ELPH-MCNC: 4 G/DL — SIGNIFICANT CHANGE UP (ref 3.3–5)
ALP SERPL-CCNC: 51 U/L — SIGNIFICANT CHANGE UP (ref 40–120)
ALT FLD-CCNC: 152 U/L — HIGH (ref 12–78)
AMPHET UR-MCNC: NEGATIVE — SIGNIFICANT CHANGE UP
ANION GAP SERPL CALC-SCNC: 7 MMOL/L — SIGNIFICANT CHANGE UP (ref 5–17)
APAP SERPL-MCNC: < 2 UG/ML (ref 10–30)
APPEARANCE UR: CLEAR — SIGNIFICANT CHANGE UP
AST SERPL-CCNC: 110 U/L — HIGH (ref 15–37)
BARBITURATES UR SCN-MCNC: NEGATIVE — SIGNIFICANT CHANGE UP
BASOPHILS # BLD AUTO: 0 K/UL — SIGNIFICANT CHANGE UP (ref 0–0.2)
BASOPHILS NFR BLD AUTO: 0 % — SIGNIFICANT CHANGE UP (ref 0–2)
BENZODIAZ UR-MCNC: NEGATIVE — SIGNIFICANT CHANGE UP
BILIRUB SERPL-MCNC: 0.4 MG/DL — SIGNIFICANT CHANGE UP (ref 0.2–1.2)
BILIRUB UR-MCNC: NEGATIVE — SIGNIFICANT CHANGE UP
BUN SERPL-MCNC: 12 MG/DL — SIGNIFICANT CHANGE UP (ref 7–23)
CALCIUM SERPL-MCNC: 8.4 MG/DL — LOW (ref 8.5–10.1)
CHLORIDE SERPL-SCNC: 104 MMOL/L — SIGNIFICANT CHANGE UP (ref 96–108)
CO2 SERPL-SCNC: 28 MMOL/L — SIGNIFICANT CHANGE UP (ref 22–31)
COCAINE METAB.OTHER UR-MCNC: NEGATIVE — SIGNIFICANT CHANGE UP
COLOR SPEC: YELLOW — SIGNIFICANT CHANGE UP
CREAT SERPL-MCNC: 0.95 MG/DL — SIGNIFICANT CHANGE UP (ref 0.5–1.3)
DIFF PNL FLD: ABNORMAL
EOSINOPHIL # BLD AUTO: 0.06 K/UL — SIGNIFICANT CHANGE UP (ref 0–0.5)
EOSINOPHIL NFR BLD AUTO: 2 % — SIGNIFICANT CHANGE UP (ref 0–6)
ETHANOL SERPL-MCNC: 228 MG/DL — HIGH (ref 0–10)
FLUAV AG NPH QL: SIGNIFICANT CHANGE UP
FLUBV AG NPH QL: SIGNIFICANT CHANGE UP
GLUCOSE SERPL-MCNC: 95 MG/DL — SIGNIFICANT CHANGE UP (ref 70–99)
GLUCOSE UR QL: NEGATIVE MG/DL — SIGNIFICANT CHANGE UP
HCT VFR BLD CALC: 43.1 % — SIGNIFICANT CHANGE UP (ref 39–50)
HGB BLD-MCNC: 14.7 G/DL — SIGNIFICANT CHANGE UP (ref 13–17)
KETONES UR-MCNC: NEGATIVE — SIGNIFICANT CHANGE UP
LEUKOCYTE ESTERASE UR-ACNC: NEGATIVE — SIGNIFICANT CHANGE UP
LYMPHOCYTES # BLD AUTO: 1.76 K/UL — SIGNIFICANT CHANGE UP (ref 1–3.3)
LYMPHOCYTES # BLD AUTO: 60 % — HIGH (ref 13–44)
MANUAL SMEAR VERIFICATION: SIGNIFICANT CHANGE UP
MCHC RBC-ENTMCNC: 30.1 PG — SIGNIFICANT CHANGE UP (ref 27–34)
MCHC RBC-ENTMCNC: 34.1 GM/DL — SIGNIFICANT CHANGE UP (ref 32–36)
MCV RBC AUTO: 88.1 FL — SIGNIFICANT CHANGE UP (ref 80–100)
METHADONE UR-MCNC: NEGATIVE — SIGNIFICANT CHANGE UP
MONOCYTES # BLD AUTO: 0.12 K/UL — SIGNIFICANT CHANGE UP (ref 0–0.9)
MONOCYTES NFR BLD AUTO: 4 % — SIGNIFICANT CHANGE UP (ref 2–14)
NEUTROPHILS # BLD AUTO: 1 K/UL — LOW (ref 1.8–7.4)
NEUTROPHILS NFR BLD AUTO: 34 % — LOW (ref 43–77)
NITRITE UR-MCNC: NEGATIVE — SIGNIFICANT CHANGE UP
NRBC # BLD: 0 /100 — SIGNIFICANT CHANGE UP (ref 0–0)
NRBC # BLD: SIGNIFICANT CHANGE UP /100 WBCS (ref 0–0)
OPIATES UR-MCNC: NEGATIVE — SIGNIFICANT CHANGE UP
PCP SPEC-MCNC: SIGNIFICANT CHANGE UP
PCP UR-MCNC: NEGATIVE — SIGNIFICANT CHANGE UP
PH UR: 8 — SIGNIFICANT CHANGE UP (ref 5–8)
PLAT MORPH BLD: NORMAL — SIGNIFICANT CHANGE UP
PLATELET # BLD AUTO: 214 K/UL — SIGNIFICANT CHANGE UP (ref 150–400)
POTASSIUM SERPL-MCNC: 4 MMOL/L — SIGNIFICANT CHANGE UP (ref 3.5–5.3)
POTASSIUM SERPL-SCNC: 4 MMOL/L — SIGNIFICANT CHANGE UP (ref 3.5–5.3)
PROT SERPL-MCNC: 7.7 GM/DL — SIGNIFICANT CHANGE UP (ref 6–8.3)
PROT UR-MCNC: NEGATIVE MG/DL — SIGNIFICANT CHANGE UP
RBC # BLD: 4.89 M/UL — SIGNIFICANT CHANGE UP (ref 4.2–5.8)
RBC # FLD: 13.9 % — SIGNIFICANT CHANGE UP (ref 10.3–14.5)
RBC BLD AUTO: NORMAL — SIGNIFICANT CHANGE UP
SALICYLATES SERPL-MCNC: <1.7 MG/DL — LOW (ref 2.8–20)
SARS-COV-2 RNA SPEC QL NAA+PROBE: SIGNIFICANT CHANGE UP
SODIUM SERPL-SCNC: 139 MMOL/L — SIGNIFICANT CHANGE UP (ref 135–145)
SP GR SPEC: 1.01 — SIGNIFICANT CHANGE UP (ref 1.01–1.02)
THC UR QL: NEGATIVE — SIGNIFICANT CHANGE UP
TSH SERPL-MCNC: 0.95 UIU/ML — SIGNIFICANT CHANGE UP (ref 0.36–3.74)
UROBILINOGEN FLD QL: 4 MG/DL
WBC # BLD: 2.94 K/UL — LOW (ref 3.8–10.5)
WBC # FLD AUTO: 2.94 K/UL — LOW (ref 3.8–10.5)

## 2021-02-26 PROCEDURE — 93010 ELECTROCARDIOGRAM REPORT: CPT

## 2021-02-26 PROCEDURE — 99285 EMERGENCY DEPT VISIT HI MDM: CPT

## 2021-02-26 NOTE — ED ADULT NURSE NOTE - OBJECTIVE STATEMENT
Pt is a 21 yo M, Aox4 BIBA from home mother called ambulance because pt was drinking and inflicting self harm by cutting arms with razor blade. Pt has significant prior wounds from slef cutting on bilateral arms and right leg. Pt states he drank 1L of vodka today, last drink was approx 1500, and began cutting self with no intention of suicide. Pt denies SI or plan, HI, visual , auditory or tactile hallucinations. Pt states hx of MDD and borderline personality disorder. Pt is currently calm, cooperative, acting appropriate to situation, makes eye contact and speaking coherently. Denies other pmh, or allergy. Currently takes Zoloft and is compliant with regimen. Pt has psychiatrist, Dr. Beasley that he speaks to every M/F.

## 2021-02-26 NOTE — ED PROVIDER NOTE - PROGRESS NOTE DETAILS
Cash: Pt care signed out to me at change of shift. Resting comfortably, calm and cooperative. Updated to plan for transfer. Pending bed placement. Cash: Psych update -  No beds available, will ask SW to help w/ outside network search. Restart home meds, Abilify 5mg, Zoloft 50mg. Cash: Bed available at [facility]. Pt will be transferred. Pt and family updated.

## 2021-02-26 NOTE — ED ADULT TRIAGE NOTE - CHIEF COMPLAINT QUOTE
21 y/o male with PMH of depression, & self injury. BIBA with c/c of feeling depressed and had thought of hurting himself last night.

## 2021-02-26 NOTE — ED PROVIDER NOTE - PHYSICAL EXAMINATION
Gen: Alert, Well appearing. NAD    Head: NC, AT, PERRL, normal lids/conjunctiva   ENT: Bilateral TM WNL, patent oropharynx without erythema/exudate, uvula midline  Neck: supple, no tenderness/meningismus  Pulm: Bilateral clear BS, normal resp effort  CV: RRR, no M/R/G, +dist pulses   Abd: soft, NT/ND, +BS, no guarding/rebound tenderness  Mskel: extremities x4 with normal ROM. no ctl spine ttp. no edema/erythema/cyanosis   Skin: + multiple old cutting marks to b/l forearmt. 2 fresh very small superficial abrasions to left forearm  Neuro: AAOx3, no sensory/motor deficits, CN 2-12 intact   psych: not making eye contact, but coherent, linear
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
## 2021-02-26 NOTE — ED ADULT NURSE NOTE - NSIMPLEMENTINTERV_GEN_ALL_ED
Implemented All Universal Safety Interventions:  [place] to call system. Call bell, personal items and telephone within reach. Instruct patient to call for assistance. Room bathroom lighting operational. Non-slip footwear when patient is off stretcher. Physically safe environment: no spills, clutter or unnecessary equipment. Stretcher in lowest position, wheels locked, appropriate side rails in place.

## 2021-02-27 ENCOUNTER — INPATIENT (INPATIENT)
Facility: HOSPITAL | Age: 21
LOS: 8 days | Discharge: HOME | DRG: 885 | End: 2021-03-08
Attending: PSYCHIATRY & NEUROLOGY | Admitting: PSYCHIATRY & NEUROLOGY
Payer: COMMERCIAL

## 2021-02-27 VITALS — OXYGEN SATURATION: 100 % | HEIGHT: 72 IN | WEIGHT: 171.08 LBS | TEMPERATURE: 98 F | RESPIRATION RATE: 16 BRPM

## 2021-02-27 VITALS
TEMPERATURE: 98 F | RESPIRATION RATE: 18 BRPM | SYSTOLIC BLOOD PRESSURE: 132 MMHG | HEART RATE: 71 BPM | OXYGEN SATURATION: 96 % | DIASTOLIC BLOOD PRESSURE: 87 MMHG

## 2021-02-27 DIAGNOSIS — F33.1 MAJOR DEPRESSIVE DISORDER, RECURRENT, MODERATE: ICD-10-CM

## 2021-02-27 DIAGNOSIS — F10.10 ALCOHOL ABUSE, UNCOMPLICATED: ICD-10-CM

## 2021-02-27 LAB
BACTERIA # UR AUTO: ABNORMAL
RBC CASTS # UR COMP ASSIST: SIGNIFICANT CHANGE UP /HPF (ref 0–4)
WBC UR QL: SIGNIFICANT CHANGE UP

## 2021-02-27 PROCEDURE — 93005 ELECTROCARDIOGRAM TRACING: CPT

## 2021-02-27 PROCEDURE — 90792 PSYCH DIAG EVAL W/MED SRVCS: CPT | Mod: 95

## 2021-02-27 PROCEDURE — 83036 HEMOGLOBIN GLYCOSYLATED A1C: CPT

## 2021-02-27 PROCEDURE — 80076 HEPATIC FUNCTION PANEL: CPT

## 2021-02-27 PROCEDURE — 80061 LIPID PANEL: CPT

## 2021-02-27 PROCEDURE — 84443 ASSAY THYROID STIM HORMONE: CPT

## 2021-02-27 PROCEDURE — 80178 ASSAY OF LITHIUM: CPT

## 2021-02-27 PROCEDURE — 36415 COLL VENOUS BLD VENIPUNCTURE: CPT

## 2021-02-27 PROCEDURE — 83735 ASSAY OF MAGNESIUM: CPT

## 2021-02-27 PROCEDURE — 80048 BASIC METABOLIC PNL TOTAL CA: CPT

## 2021-02-27 PROCEDURE — 86769 SARS-COV-2 COVID-19 ANTIBODY: CPT

## 2021-02-27 PROCEDURE — 84100 ASSAY OF PHOSPHORUS: CPT

## 2021-02-27 RX ORDER — ARIPIPRAZOLE 15 MG/1
5 TABLET ORAL ONCE
Refills: 0 | Status: COMPLETED | OUTPATIENT
Start: 2021-02-27 | End: 2021-02-27

## 2021-02-27 RX ORDER — TRAZODONE HCL 50 MG
50 TABLET ORAL AT BEDTIME
Refills: 0 | Status: DISCONTINUED | OUTPATIENT
Start: 2021-02-27 | End: 2021-03-08

## 2021-02-27 RX ORDER — HALOPERIDOL DECANOATE 100 MG/ML
5 INJECTION INTRAMUSCULAR EVERY 6 HOURS
Refills: 0 | Status: DISCONTINUED | OUTPATIENT
Start: 2021-02-27 | End: 2021-03-08

## 2021-02-27 RX ORDER — DIPHENHYDRAMINE HCL 50 MG
50 CAPSULE ORAL EVERY 6 HOURS
Refills: 0 | Status: DISCONTINUED | OUTPATIENT
Start: 2021-02-27 | End: 2021-03-08

## 2021-02-27 RX ORDER — INFLUENZA VIRUS VACCINE 15; 15; 15; 15 UG/.5ML; UG/.5ML; UG/.5ML; UG/.5ML
0.5 SUSPENSION INTRAMUSCULAR ONCE
Refills: 0 | Status: DISCONTINUED | OUTPATIENT
Start: 2021-02-27 | End: 2021-03-08

## 2021-02-27 RX ORDER — SERTRALINE 25 MG/1
50 TABLET, FILM COATED ORAL ONCE
Refills: 0 | Status: COMPLETED | OUTPATIENT
Start: 2021-02-27 | End: 2021-02-27

## 2021-02-27 RX ADMIN — ARIPIPRAZOLE 5 MILLIGRAM(S): 15 TABLET ORAL at 10:27

## 2021-02-27 RX ADMIN — SERTRALINE 50 MILLIGRAM(S): 25 TABLET, FILM COATED ORAL at 10:27
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
## 2021-02-27 NOTE — PATIENT PROFILE BEHAVIORAL HEALTH - REASON FOR ADMISSION
presents depressed, with increased drinking, increased cutting, and concerns for SI/SA that patient won't discuss. Moreover, appears to be high risk considering psych history.  Pt with significant history of cutting and SA and family and private therapist worried his behavior would escalate again. Pt appears anxious, but is cooperative. currently denies any SI.Unit orientation given. CIWA 0.

## 2021-02-27 NOTE — ED BEHAVIORAL HEALTH NOTE - BEHAVIORAL HEALTH NOTE
TELEPSYCHIATRY REASSESSMENT:    Subjective:   Patient still relaying not suicidal and inquiring about need for and possible length for admission. He offers no new complaints at this time and clarifies that he had stopped taking his medications and became more depressed. He notes that prior medications Zoloft 150 mg and Abilify 10 mg were only somewhat helpful and affirms he had taken them for over 6 weeks at that time. Brief medication management discussion attempted and patient expressed indifference with options given. He agreed to resuming Zoloft and Abilify while awaiting psychiatric bed availability in the ED. This writer offered to patient having a lengthier discussion later in the day (as he currently appeared disengaged) to ascertain safety concerns in an effort to support his request for discharge rather than admission. However, patient notably states "I don't really feel like talking about this anymore."     Additional COVID Screener  - Denied vaccination; Denied known prior infection; denied known antibodies    Objective:   MSE unchanged; notably guarded, disengaged, withdrawn in appearance, constricted (almost flat) affecdt.     Assessment:   As per Dr. Sanderson; 21yo M, domiciled with mother, unemployed, non-caregiver, PPH of depression, borderline personality disorder, alcohol abuse, at least 2 prior psych hospitalizations, in 2019, and most recently in 2/2/2020 at [facility] for cutting in the setting of alcohol intoxication, currently in outpatient txt with psychiatrist Dr. Beasley, hx NSSIB and SA via asphyxiation w/plastic bag and cutting, no hx of aggressive or violent behavior, currently binge drinking alcohol, no history of detox/rehab or withdrawal symptoms, no legal issues who was BIBEMS again for cutting in the context of alcohol use. Patient has significant wounds from prior self cutting on bilateral arms and right leg.    Current presentation is similar to when patient was last admitted to [facility] in 2020: presents depressed, with increased drinking, increased cutting, and concerns for SI/SA that patient won't discuss. Moreover, appears to be high risk considering psych history. At this time, currently meets criteria for involuntary admission and could benefit from hospitalization for safety and stabilization via titration of medication.    Plan:   Unchanged as per Dr. Sanderson:    Admit to inpatient psychiatry      Legal status: 9.27  New recommendations:      Give Zoloft 50 mg and Abilify 5 mg this morning    ED SW to engage in bed search for hospital bed outside [facility] TELEPSYCHIATRY REASSESSMENT:    Subjective:   Patient still relaying not suicidal and inquiring about need for and possible length for admission. He offers no new complaints at this time and clarifies that he had stopped taking his medications and became more depressed. He notes that prior medications Zoloft 150 mg and Abilify 10 mg were only somewhat helpful and affirms he had taken them for over 6 weeks at that time. Brief medication management discussion attempted and patient expressed indifference with options given. He agreed to resuming Zoloft and Abilify while awaiting psychiatric bed availability in the ED. This writer offered to patient having a lengthier discussion later in the day (as he currently appeared disengaged) to ascertain safety concerns in an effort to support his request for discharge rather than admission. However, patient notably states "I don't really feel like talking about this anymore."     Additional COVID Screener  - Denied vaccination; Denied known prior infection; denied known antibodies    Objective:   MSE unchanged; notably guarded, disengaged, withdrawn in appearance, constricted (almost flat) affecdt.     Assessment:   As per Dr. Sanderson; 19yo M, domiciled with mother, unemployed, non-caregiver, PPH of depression, borderline personality disorder, alcohol abuse, at least 2 prior psych hospitalizations, in 2019, and most recently in 2/2/2020 at [facility] for cutting in the setting of alcohol intoxication, currently in outpatient txt with psychiatrist Dr. Beasley, hx NSSIB and SA via asphyxiation w/plastic bag and cutting, no hx of aggressive or violent behavior, currently binge drinking alcohol, no history of detox/rehab or withdrawal symptoms, no legal issues who was BIBEMS again for cutting in the context of alcohol use. Patient has significant wounds from prior self cutting on bilateral arms and right leg.    Current presentation is similar to when patient was last admitted to [facility] in 2020: presents depressed, with increased drinking, increased cutting, and concerns for SI/SA that patient won't discuss. Moreover, appears to be high risk considering psych history. At this time, currently meets criteria for involuntary admission and could benefit from hospitalization for safety and stabilization via titration of medication.    Plan:   Unchanged as per Dr. Sanderson:    Admit to inpatient psychiatry      Legal status: 9.27  New recommendations:      Give Zoloft 50 mg and Abilify 5 mg this morning    ED SW to engage in bed search for hospital bed outside [facility]        COLLATERAL UPDATE:   Spoke with mother, Mitzi, at 1130 who called to clarify his medications and ask relevant treatment / dispo related questions. She noted that he had actually been taking Zoloft 100 mg rather than 50 mg but affirmed uncertainty with his overall compliance. She inquired about timing, adverse effects, benefits of medications, alternative treatments including dTMS and DTB psychotherapy. Extensive psychoeducation provided. She relayed a desire for updates after re-evaluations and expressed a preference for a [facility] psychiatric unit though agreed that she didn't want patient in ED for extended period of time waiting specifically for a [facility] bed. She conveyed relevant social history of patient's father  she and patient when patient was 8 and she wasn't able to reconnect with him until 16 after he attempted suicide. She noted that patient is well supported by herself and his siblings and wanted to ensure his care team utilizes his full support system as needed.

## 2021-02-27 NOTE — ED BEHAVIORAL HEALTH NOTE - BEHAVIORAL HEALTH NOTE
Patient updated on tentative plan for transfer to . TELEPSYCHIATRY REASSESSMENT:    S/O:    Patient updated on tentative plan for transfer to . He continues to inquire about length of admission and otherwise appears entirely apathetic to the transfer process, location and such.   Patient informed mother will be updated and he expressed agreement / indifference.   Message left for mother (no identifiers) at [phone] with transport plan, ETA;               [facility] unit phone #; [facility] ED phone #; and Telepsychiatry phone #.     A/P:  As per Dr. Sanderson; 19yo M, domiciled with mother, unemployed, non-caregiver, PPH of depression, borderline personality disorder, alcohol abuse, at least 2 prior psych hospitalizations, in 2019, and most recently in 2/2/2020 at [facility] for cutting in the setting of alcohol intoxication, currently in outpatient txt with psychiatrist Dr. Beasley, hx NSSIB and SA via asphyxiation w/plastic bag and cutting, no hx of aggressive or violent behavior, currently binge drinking alcohol, no history of detox/rehab or withdrawal symptoms, no legal issues who was BIBEMS again for cutting in the context of alcohol use. Patient has significant wounds from prior self cutting on bilateral arms and right leg. Current presentation is similar to when patient was last admitted to [facility] in 2020: presents depressed, with increased drinking, increased cutting, and concerns for SI/SA that patient won't discuss. Moreover, appears to be high risk considering psych history. At this time, currently meets criteria for involuntary admission and could benefit from hospitalization for safety and stabilization via titration of medication.    Admit to inpatient psychiatry: Location [facility]     Legal Status: 9.27 (Involuntary; 2PC)    Need for one to one on psychiatric unit? No   Referent to ED updated? Yes

## 2021-02-27 NOTE — ED BEHAVIORAL HEALTH ASSESSMENT NOTE - SUMMARY
19yo M, domiciled with mother, unemployed, non-caregiver, PPH of depression, borderline personality disorder, alcohol abuse, at least 2 prior psych hospitalizations, in 2019, and most recently in 2/2/2020 at [facility] for cutting in the setting of alcohol intoxication, currently in outpatient txt with psychiatrist Dr. Bealsey, hx NSSIB and SA via asphyxiation w/plastic bag and cutting, no hx of aggressive or violent behavior, currently binge drinking alcohol, no history of detox/rehab or withdrawal symptoms, no legal issues who was BIBEMS again for cutting in the context of alcohol use. Patient has significant wounds from prior self cutting on bilateral arms and right leg.    Current presentation is similar to when patient was last admitted to [facility] in 2020: presents depressed, with increased drinking, increased cutting, and concerns for SI/SA that patient won't discuss. Moreover, appears to be high risk considering psych history. At this time, currently meets criteria for involuntary admission and could benefit from hospitalization for safety and stabilization via titration of medication.

## 2021-02-27 NOTE — ED BEHAVIORAL HEALTH NOTE - BEHAVIORAL HEALTH NOTE
Patient seen and reevaluated by this writer. Patient pending transfer to  for inpatient admission. On interview and exam, patient continues to ask about length of stay during inpatient admission. No acute issues at present time. Plan to transfer to .     Meron Gar MD MPH

## 2021-02-27 NOTE — PATIENT PROFILE BEHAVIORAL HEALTH - TEACHING/LEARNING LEARNING PREFERENCES
audio/computer/internet/individual instruction/skill demonstration/verbal instruction/video/written material

## 2021-02-27 NOTE — ED BEHAVIORAL HEALTH ASSESSMENT NOTE - HPI (INCLUDE ILLNESS QUALITY, SEVERITY, DURATION, TIMING, CONTEXT, MODIFYING FACTORS, ASSOCIATED SIGNS AND SYMPTOMS)
19yo M, domiciled with mother, unemployed, non-caregiver, PPH of depression, borderline personality disorder, alcohol abuse, at least 2 prior psych hospitalizations, in 2019, and most recently in 2/2/2020 at [facility] for cutting in the setting of alcohol intoxication, currently in outpatient txt with psychiatrist Dr. Beasley, hx NSSIB and SA via asphyxiation w/plastic bag and cutting, no hx of aggressive or violent behavior, currently binge drinking alcohol, no history of detox/rehab or withdrawal symptoms, no legal issues who was BIBEMS again for cutting in the context of alcohol use. Patient has significant wounds from prior self cutting on bilateral arms and right leg.    On interview patient was guarded, with constricted affect, minimally answering questions. Stated that he had been drinking 2L of vodka. Last cut last night, and when asked why of if there is a trigger, replied "I don't know." Before that cut several months ago. Patient states that he has been feeling more depressed and low energy. Presented apathetic as well. Although he stated "I'm fine," wanted to go home, and denied SI/HI increased drinking and cutting are of concern. Patient denied any AH or VH. He denied thoughts of harming others. Could not complete any safety plan, was difficult to engage. Currently is on Zoloft 50mg only. Does not recall when he saw psychiatrist last.     Of note, reviewed discharge summary from 2/12/2020 [facility]. At the time, pt was dx with Alcohol Dependence and MDD. Medications were Abilify 10mg daily, naltrexone 50mg daily, and Zoloft 150mg daily.

## 2021-02-27 NOTE — ED BEHAVIORAL HEALTH NOTE - BEHAVIORAL HEALTH NOTE
===================  PRE-HOSPITAL COURSE  ===================  SOURCE: Chart    DETAILS: Patient arrived via EMS for depression and self-injury    ============  ED COURSE   ============  SOURCE: Chart, ED    ARRIVAL: Patient arrived via EMS    BELONGINGS: No items of note    BEHAVIOR: Patient arrived to the ED alert and oriented x3 intoxicated with a BAL of 228@2016, patient was cooperative with ED medical and safety protocols. Patient reported depressed mood, affect was congruent. Patient reported SI with no specific plan, reports recent cutting episodes with healed scars to bilateral arms and right leg and 2 new small superficial abrasions to left arm, does not report or exhibit overt psychotic/manic symptoms, his thought process/speech WNL. Patient remained in good behavioral control while in the ED.     TREATMENT: No PRN psychiatric medication prior to assessment     VISITORS: None     ========================  COLLATERAL  ========================  NAME: Mitzi     NUMBER: [phone]    RELATIONSHIP: Mother    RELIABILITY: Good    COMMENTS: Resides with patient     ========================  HPI  ========================    BASELINE FUNCTIONING: Patient resides with mother, stepfather and 3 brothers, he is unemployed and not in school. Patient is currently in treatment with a psychiatrist/therapist.     DATE HPI STARTED: A few months ago    DECOMPENSATION: Per mother patient started to decompensate a few months ago, stopped taking his medications and was appearing more depressed. Patient was isolating at home and increasing Etoh use. Patient quit his job around December after saying his coworkers were out to get him/possibly paranoid. She states he stays up late and sleeps during the day. Around 2 weeks ago found blood in room and patient admitted to cutting. Patient’s close friend who he confides in alerted family that she has been having to talk patient out of committing suicide almost nightly and she is very concerned about him. Mother states patient’s last suicide attempt this friend was the one who alerted family. Mother states therapist/psychiatrist called recently and said patient is missing appointments and expressing concerned, outpatient team called mobile crisis who recommended patient be brought to the ED. Mother is concerned for patient’s safety at this time and believes he requires inpatient hospitalization for stabilization and safety.     SUICIDALITY: SI reported to friend frequently, friend states she has to talk patient out of suicide almost nightly recently    VIOLENCE: No violence    SUBSTANCE: Daily Etoh abuse, unknown quantity as he hides it    ========================  PAST PSYCHIATRIC HISTORY  ========================    See past [facility] Assessments for additional hx    1.        *Has the patient had a COVID-19 test in the last 21 days?  (  ) Yes   ( x ) No   (  ) Unknown- Reason: ______  IF YES PROCEED TO QUESTION #2. IF NO OR UNKNOWN THEN PLEASE SKIP TO QUESTION #3.  2.        Date of test: ________  3.        Do you know the result? (  ) Negative   (  ) Positive   (  ) No result available  4.        *In the past 10 days, has the patient been around anyone with a positive COVID-19 test?*  (  ) Yes   ( x ) No   (  ) Unknown- Reason (e.g. collateral uncertain, refusing to answer, etc.):  ______  IF YES PROCEED TO QUESTION #5. IF NO or UNKNOWN, PLEASE SKIP TO QUESTION #10  5.        Was the patient within 6 feet of them for at least 15 minutes? (  ) Yes   (  ) No   (  ) Unknown- Reason: ______   6.        Did the patient provide care for them? (  ) Yes   (  ) No   (  ) Unknown- Reason: ______   7.        Did the patient have direct physical contact with them (touched, hugged, or kissed them)? (  ) Yes   (  ) No    (  ) Unknown- Reason: ______   8.        Did the patient share eating or drinking utensils with them? (  ) Yes   (  ) No    (  ) Unknown- Reason: ______   9.        Have they sneezed, coughed, or somehow got respiratory droplets on the patient? (  ) Yes   (  ) No    (  ) Unknown- Reason: ______   10.     *Has the patient been out of [place] within the past 10 days?*  (  ) Yes   (x  ) No   (  ) Unknown- Reason (e.g. collateral uncertain, sedated, refusing to answer, etc.): _______  IF YES PLEASE ANSWER THE FOLLOWING QUESTIONS:  11.     Which state/country has the patient been to? ______  12.     Were they there over 24 hours? (  ) Yes   (  ) No    (  ) Unknown- Reason: ______  13.     Date of return to [facility]: ______

## 2021-02-27 NOTE — ED BEHAVIORAL HEALTH ASSESSMENT NOTE - RISK ASSESSMENT
History of mental illness, suicide attempts, self injury, poor frustration tolerance. Could benefit from safer environment and further titration of medications. High Acute Suicide Risk

## 2021-02-27 NOTE — ED BEHAVIORAL HEALTH ASSESSMENT NOTE - PSYCHIATRIC ISSUES AND PLAN (INCLUDE STANDING AND PRN MEDICATION)
Increase Zoloft from 50mg daily to 75mg daily. Would consider restarting Abilify and Naltrexone and collaboratiing with outpatient psychiatrist.

## 2021-02-27 NOTE — ED ADULT NURSE REASSESSMENT NOTE - STATUS
states he is no longer suicidal/awaiting transfer, assessment change
patient not SI/awaiting transfer, assessment change

## 2021-02-27 NOTE — ED ADULT NURSE REASSESSMENT NOTE - NS ED NURSE REASSESS COMMENT FT1
Pt in no acute distress, ambulated to restroom w/ steady gait
Spoke with tele psych and gave them Ms. Mendoza's ( mother) cell number [phone]. She called and stated she would like to speak to them about her son's medication dosage being too low.
patient made aware that he is waiting for acceptance/transfer to psych facility. patient given breakfast, aaox3,. continues on 1:1 for SI/safety. safety maintained at all times, will continue to monitor.
Endorsement given to Mariah RN [facility] [place], awaiting transport
patient states he would like to talk to ER MD this morning because he would like to go home and is no longer suicidal. Dr. Weiner made aware and she will speak with patient. patient sitting comfortably on stretcher, aaox4, HAGAN x4, breathing spontaneously on room air, equal chest rise/fall, BS x4, voids independently, skin intact ( superficial scratches to forearm.

## 2021-02-28 LAB
ALBUMIN SERPL ELPH-MCNC: 4.1 G/DL — SIGNIFICANT CHANGE UP (ref 3.3–5)
ALP SERPL-CCNC: 51 U/L — SIGNIFICANT CHANGE UP (ref 40–120)
ALT FLD-CCNC: 153 U/L — HIGH (ref 12–78)
ANION GAP SERPL CALC-SCNC: 4 MMOL/L — LOW (ref 5–17)
AST SERPL-CCNC: 100 U/L — HIGH (ref 15–37)
BILIRUB DIRECT SERPL-MCNC: 0.2 MG/DL — SIGNIFICANT CHANGE UP (ref 0–0.2)
BILIRUB INDIRECT FLD-MCNC: 0.6 MG/DL — SIGNIFICANT CHANGE UP (ref 0.2–1)
BILIRUB SERPL-MCNC: 0.8 MG/DL — SIGNIFICANT CHANGE UP (ref 0.2–1.2)
BUN SERPL-MCNC: 13 MG/DL — SIGNIFICANT CHANGE UP (ref 7–23)
CALCIUM SERPL-MCNC: 9.3 MG/DL — SIGNIFICANT CHANGE UP (ref 8.5–10.1)
CHLORIDE SERPL-SCNC: 102 MMOL/L — SIGNIFICANT CHANGE UP (ref 96–108)
CO2 SERPL-SCNC: 29 MMOL/L — SIGNIFICANT CHANGE UP (ref 22–31)
CREAT SERPL-MCNC: 1.09 MG/DL — SIGNIFICANT CHANGE UP (ref 0.5–1.3)
GLUCOSE SERPL-MCNC: 127 MG/DL — HIGH (ref 70–99)
MAGNESIUM SERPL-MCNC: 2.1 MG/DL — SIGNIFICANT CHANGE UP (ref 1.6–2.6)
PHOSPHATE SERPL-MCNC: 3.7 MG/DL — SIGNIFICANT CHANGE UP (ref 2.5–4.5)
POTASSIUM SERPL-MCNC: 3.9 MMOL/L — SIGNIFICANT CHANGE UP (ref 3.5–5.3)
POTASSIUM SERPL-SCNC: 3.9 MMOL/L — SIGNIFICANT CHANGE UP (ref 3.5–5.3)
PROT SERPL-MCNC: 7.8 GM/DL — SIGNIFICANT CHANGE UP (ref 6–8.3)
SODIUM SERPL-SCNC: 135 MMOL/L — SIGNIFICANT CHANGE UP (ref 135–145)

## 2021-02-28 PROCEDURE — 93010 ELECTROCARDIOGRAM REPORT: CPT

## 2021-02-28 PROCEDURE — 90792 PSYCH DIAG EVAL W/MED SRVCS: CPT

## 2021-02-28 RX ORDER — THIAMINE MONONITRATE (VIT B1) 100 MG
100 TABLET ORAL DAILY
Refills: 0 | Status: COMPLETED | OUTPATIENT
Start: 2021-02-28 | End: 2021-03-03

## 2021-02-28 RX ORDER — ARIPIPRAZOLE 15 MG/1
5 TABLET ORAL DAILY
Refills: 0 | Status: DISCONTINUED | OUTPATIENT
Start: 2021-02-28 | End: 2021-03-01

## 2021-02-28 RX ORDER — SERTRALINE 25 MG/1
100 TABLET, FILM COATED ORAL DAILY
Refills: 0 | Status: DISCONTINUED | OUTPATIENT
Start: 2021-02-28 | End: 2021-03-08

## 2021-02-28 RX ORDER — FOLIC ACID 0.8 MG
1 TABLET ORAL DAILY
Refills: 0 | Status: DISCONTINUED | OUTPATIENT
Start: 2021-02-28 | End: 2021-03-08

## 2021-02-28 RX ADMIN — Medication 50 MILLIGRAM(S): at 20:14

## 2021-02-28 NOTE — BEHAVIORAL HEALTH ASSESSMENT NOTE - HPI (INCLUDE ILLNESS QUALITY, SEVERITY, DURATION, TIMING, CONTEXT, MODIFYING FACTORS, ASSOCIATED SIGNS AND SYMPTOMS)
Pt is a 19yo M, domiciled with mother, unemployed, non-caregiver, PPH of depression, borderline personality disorder, alcohol abuse, at least 2 prior psych hospitalizations, in 2019, and most recently in 2/2/2020 at [facility] for cutting in the setting of alcohol intoxication, currently in outpatient txt with psychiatrist Dr. chichi Beasley, AND PSYCHIATRIST IN [facility], on ZOLOFT 100MG AND ABILIFY 5MG, hx of seleniferous behavior and  SA via asphyxiation w/plastic bag and cutting, no hx of aggressive or violent behavior, currently binge drinking alcohol, no history of detox/rehab or withdrawal symptoms, no legal issues who was BIBEMS again for cutting in the context of alcohol use. Patient has SCARS B/L ARMS from prior self cutting on bilateral arms and right leg.    On interview patient was guarded, with constricted affect, but answering questions.  he admits to having chr depression, insomnia, urges to self mutilate denies today. Admits to having passive SI, but no plan or intent.     Per records discharge summary from 2/12/2020 [facility]. At the time, pt was dx with Alcohol Dependence and MDD. Medications were Abilify 10mg daily, naltrexone 50mg daily, and Zoloft 150mg daily.

## 2021-02-28 NOTE — BEHAVIORAL HEALTH ASSESSMENT NOTE - RISK ASSESSMENT
High Acute Suicide Risk Moderated acute risk: s/p cutting , impulsive, depressed, passive SI, but no plan or intent,. EtOH abuse.   Increased long term risk: History of mental illness, suicide attempts, self injury, poor frustration tolerance. Could benefit from safer environment and further titration of medications.  Protective factors: has future plans and supportive family.   Mitigation of  risks: inpatient level, safety plans, meds.

## 2021-02-28 NOTE — CONSULT NOTE ADULT - ATTENDING COMMENTS
20 year old male patient with narrative as previously seen      -Admitted to [facility]      # Suicidal Ideation  -management per psych    #Depression   -management per psych    #Hx of Alcohol abuse  -give vitamin supplementation    # DVT ppx  - encourage ambulation

## 2021-02-28 NOTE — BEHAVIORAL HEALTH ASSESSMENT NOTE - ACTIVATING EVENTS/STRESSORS
Triggering events leading to humiliation, shame, and/or despair (e.g. Loss of relationship, financial or health status) (real or anticipated)/Non-compliant or not receiving treatment You can access the FollowMyHealth Patient Portal offered by [facility] by registering at the following website: [email or web address]. By joining [facility]’s FollowMyHealth portal, you will also be able to view your health information using other applications (apps) compatible with our system.

## 2021-02-28 NOTE — CONSULT NOTE ADULT - SUBJECTIVE AND OBJECTIVE BOX
HOSPITALIST PA CONSULT NOTE     ADMITTING DIAGNOSIS: Major Depressive Disorder     HISTORY OF THE PRESENT ILLNESS:  Pt is a 19yo M, past psychiatric history of depression, borderline personality disorder, alcohol abuse, at least 2 prior psych hospitalizations, in 2019, and most recently in 2020 at [facility] for cutting in the setting of alcohol intoxication, currently in outpatient txt, hx of SA via asphyxiation w/plastic bag and cutting, binge drinking alcohol, no history of detox/rehab or withdrawal symptoms. Pt was BIBEMS  for cutting in the context of alcohol use. Patient has SCARS B/L ARMS from prior self cutting on bilateral arms and right leg. Patient involuntary admitted to inpatient psych for safety and stabilization.     Patient observed in the day room, non-hostile, reading a book. Patient seen and evaluated personally at bedside with psych RN. Patient offers no current medical complaints and states he is feeling better.       PAST MEDICAL & SURGICAL HISTORY:  Borderline personality disorder  Depression  No significant past surgical history      FAMILY HISTORY:   Both Mother and Father, alive and Well. Father has Diabetes     SOCIAL HISTORY:  denies tobacco and social/recreational drug use. Admits to alcohol consumption 2 times a month, last drink prior to hospital presentation 2 days ago (Blood Alcohol of 228)     REVIEW OF SYSTEMS:    CONSTITUTIONAL: No weakness, fevers or chills  EYES/ENT: No visual changes;  No vertigo or throat pain   NECK: No pain or stiffness  RESPIRATORY: No cough, wheezing, No shortness of breath  CARDIOVASCULAR: No chest pain or palpitations  GASTROINTESTINAL: No abdominal or epigastric pain. No nausea, vomiting, No diarrhea or constipation.   GENITOURINARY: No dysuria, frequency or hematuria  NEUROLOGICAL: No numbness or weakness  SKIN: No itching, burning. Positive for lesions   PSYCH: No current suicidal ideations, positive for depression but says its improved today   All other review of systems is negative unless indicated above.    MEDICATIONS  (STANDING):  ARIPiprazole 5 milliGRAM(s) Oral daily  folic acid 1 milliGRAM(s) Oral daily  influenza   Vaccine 0.5 milliLiter(s) IntraMuscular once  multivitamin 1 Tablet(s) Oral daily  sertraline 100 milliGRAM(s) Oral daily  thiamine 100 milliGRAM(s) Oral daily    MEDICATIONS  (PRN):  diphenhydrAMINE   Injectable 50 milliGRAM(s) IntraMuscular every 6 hours PRN Agitation  haloperidol    Injectable 5 milliGRAM(s) IntraMuscular every 6 hours PRN Agitation  LORazepam     Tablet 0.5 milliGRAM(s) Oral every 4 hours PRN severe anxiety  LORazepam   Injectable 2 milliGRAM(s) IV Push every 2 hours PRN Symptom-triggered: 2 point increase in CIWA -Ar score and a total score of 7 or LESS  LORazepam   Injectable 2 milliGRAM(s) IntraMuscular every 6 hours PRN Agitation  traZODone 50 milliGRAM(s) Oral at bedtime PRN insomnia      VITALS:  T(F): 97.7 (21 @ 08:08), Max: 98.1 (21 @ 23:32)  HR: --85  BP: --138/93  RR: 12 (21 @ 08:08) (12 - 16)  SpO2: 99% (21 @ 08:08) (99% - 100%)  Wt(kg): --            PHYSICAL EXAM:  General: Alert, pleasant, cooperative. NAD. Well nourished and developed   HEENT:  pupils equal and reactive, EOMI, no oropharyngeal lesions, erythema, exudates, oral thrush  NECK:   supple, no palpable lymph nodes  CV:  +S1, +S2, regular, no murmurs or rubs  RESP:   lungs clear to auscultation bilaterally, no wheezing, rales, rhonchi, good air entry bilaterally, breathing with normal effort   GI:  abdomen soft, non-tender, non-distended, normal BS, no bruits, no abdominal masses, no palpable masses  MSK:   normal muscle tone, able to move all four extremities, hips, shoulders with no difficulty.   EXT:  no clubbing, no cyanosis, no edema, no calf pain, swelling or erythema  VASCULAR:  pulses equal and symmetric in the upper and lower extremities  NEURO:  AAOX3, no focal neurological deficits, follows all commands, able to move extremities spontaneously. Speaking in full sentences, answers questions appropriately. Normal gait   SKIN:  SCARS present b/l arms and right leg from prior self cutting     LABS:            135  |  102  |  13  ----------------------------<  127<H>  3.9   |  29  |  1.09    Ca    9.3      2021 08:46  Phos  3.7       Mg     2.1         TPro  7.8  /  Alb  4.1  /  TBili  0.8  /  DBili  0.2  /  AST  100<H>  /  ALT  153<H>  /  AlkPhos  51          LIVER FUNCTIONS - ( 2021 08:46 )  Alb: 4.1 g/dL / Pro: 7.8 gm/dL / ALK PHOS: 51 U/L / ALT: 153 U/L / AST: 100 U/L / GGT: x             Urinalysis Basic - ( 2021 23:07 )    Color: Yellow / Appearance: Clear / S.015 / pH: x  Gluc: x / Ketone: Negative  / Bili: Negative / Urobili: 4 mg/dL   Blood: x / Protein: Negative mg/dL / Nitrite: Negative   Leuk Esterase: Negative / RBC: 0-2 /HPF / WBC 0-2   Sq Epi: x / Non Sq Epi: x / Bacteria: Occasional                                    EKG:   NSR @ 89 bpm   QT/QTC: 338/ 411 ms     RADIOLOGY STUDIES:      IMPRESSION:        RECOMMENDATIONS:        d/w patient, ED RN and ED physician    Time Spent:     HOSPITALIST PA CONSULT NOTE     ADMITTING DIAGNOSIS: Major Depressive Disorder     HISTORY OF THE PRESENT ILLNESS:  Pt is a 21yo M, past psychiatric history of depression, borderline personality disorder, alcohol abuse, at least 2 prior psych hospitalizations, in 2019, and most recently in 2020 at [facility] for cutting in the setting of alcohol intoxication, currently in outpatient txt, hx of SA via asphyxiation w/plastic bag and cutting, binge drinking alcohol, no history of detox/rehab or withdrawal symptoms. Pt was BIBEMS  for cutting in the context of alcohol use. Patient has SCARS B/L ARMS from prior self cutting on bilateral arms and right leg. Patient involuntary admitted to inpatient psych for safety and stabilization.     Patient observed in the day room, non-hostile, reading a book. Patient seen and evaluated personally at bedside with psych RN. Patient offers no current medical complaints and states he is feeling better.       PAST MEDICAL & SURGICAL HISTORY:  Borderline personality disorder  Depression  No significant past surgical history      FAMILY HISTORY:   Both Mother and Father, alive and Well. Father has Diabetes     SOCIAL HISTORY:  denies tobacco and social/recreational drug use. Admits to alcohol consumption 2 times a month, last drink prior to hospital presentation 2 days ago (Blood Alcohol of 228)     REVIEW OF SYSTEMS:    CONSTITUTIONAL: No weakness, fevers or chills  EYES/ENT: No visual changes;  No vertigo or throat pain   NECK: No pain or stiffness  RESPIRATORY: No cough, wheezing, No shortness of breath  CARDIOVASCULAR: No chest pain or palpitations  GASTROINTESTINAL: No abdominal or epigastric pain. No nausea, vomiting, No diarrhea or constipation.   GENITOURINARY: No dysuria, frequency or hematuria  NEUROLOGICAL: No numbness or weakness  SKIN: No itching, burning. Positive for lesions   PSYCH: No current suicidal ideations, positive for depression but says its improved today   All other review of systems is negative unless indicated above.    MEDICATIONS  (STANDING):  ARIPiprazole 5 milliGRAM(s) Oral daily  folic acid 1 milliGRAM(s) Oral daily  influenza   Vaccine 0.5 milliLiter(s) IntraMuscular once  multivitamin 1 Tablet(s) Oral daily  sertraline 100 milliGRAM(s) Oral daily  thiamine 100 milliGRAM(s) Oral daily    MEDICATIONS  (PRN):  diphenhydrAMINE   Injectable 50 milliGRAM(s) IntraMuscular every 6 hours PRN Agitation  haloperidol    Injectable 5 milliGRAM(s) IntraMuscular every 6 hours PRN Agitation  LORazepam     Tablet 0.5 milliGRAM(s) Oral every 4 hours PRN severe anxiety  LORazepam   Injectable 2 milliGRAM(s) IV Push every 2 hours PRN Symptom-triggered: 2 point increase in CIWA -Ar score and a total score of 7 or LESS  LORazepam   Injectable 2 milliGRAM(s) IntraMuscular every 6 hours PRN Agitation  traZODone 50 milliGRAM(s) Oral at bedtime PRN insomnia      VITALS:  T(F): 97.7 (21 @ 08:08), Max: 98.1 (21 @ 23:32)  HR: --85  BP: --138/93  RR: 12 (21 @ 08:08) (12 - 16)  SpO2: 99% (21 @ 08:08) (99% - 100%)  Wt(kg): --            PHYSICAL EXAM:  General: Alert, pleasant, cooperative. NAD. Well nourished and developed   HEENT:  pupils equal and reactive, EOMI, no oropharyngeal lesions, erythema, exudates, oral thrush  NECK:   supple, no palpable lymph nodes  CV:  +S1, +S2, regular, no murmurs or rubs  RESP:   lungs clear to auscultation bilaterally, no wheezing, rales, rhonchi, good air entry bilaterally, breathing with normal effort   GI:  abdomen soft, non-tender, non-distended, normal BS, no bruits, no abdominal masses, no palpable masses  MSK:   normal muscle tone, able to move all four extremities, hips, shoulders with no difficulty.   EXT:  no clubbing, no cyanosis, no edema, no calf pain, swelling or erythema  VASCULAR:  pulses equal and symmetric in the upper and lower extremities  NEURO:  AAOX3, no focal neurological deficits, follows all commands, able to move extremities spontaneously. Speaking in full sentences, answers questions appropriately. Normal gait   SKIN:  SCARS present b/l arms and right leg from prior self cutting     LABS:              135  |  102  |  13  ----------------------------<  127<H>  3.9   |  29  |  1.09    Ca    9.3      2021 08:46  Phos  3.7       Mg     2.1         TPro  7.8  /  Alb  4.1  /  TBili  0.8  /  DBili  0.2  /  AST  100<H>  /  ALT  153<H>  /  AlkPhos  51          LIVER FUNCTIONS - ( 2021 08:46 )  Alb: 4.1 g/dL / Pro: 7.8 gm/dL / ALK PHOS: 51 U/L / ALT: 153 U/L / AST: 100 U/L / GGT: x             Urinalysis Basic - ( 2021 23:07 )    Color: Yellow / Appearance: Clear / S.015 / pH: x  Gluc: x / Ketone: Negative  / Bili: Negative / Urobili: 4 mg/dL   Blood: x / Protein: Negative mg/dL / Nitrite: Negative   Leuk Esterase: Negative / RBC: 0-2 /HPF / WBC 0-2   Sq Epi: x / Non Sq Epi: x / Bacteria: Occasional        EKG: NSR @ 89 bpm   QT/QTC: 338/ 411 ms                HOSPITALIST PA CONSULT NOTE     ADMITTING DIAGNOSIS: Major Depressive Disorder     HISTORY OF THE PRESENT ILLNESS:  Pt is a 21yo M, past psychiatric history of depression, borderline personality disorder, alcohol abuse, at least 2 prior psych hospitalizations, in 2019, and most recently in 2020 at [facility] for cutting in the setting of alcohol intoxication, currently in outpatient txt, hx of SA via asphyxiation w/plastic bag and cutting, binge drinking alcohol, no history of detox/rehab or withdrawal symptoms. Pt was BIBEMS  for cutting in the context of alcohol use. Patient has SCARS B/L ARMS from prior self cutting on bilateral arms and right leg. Patient involuntary admitted to inpatient psych for safety and stabilization.     Patient observed in the day room, non-hostile, reading a book. Patient seen and evaluated personally at bedside with psych RN. Patient offers no current medical complaints and states he is feeling better.       PAST MEDICAL & SURGICAL HISTORY:  Borderline personality disorder  Depression  No significant past surgical history      FAMILY HISTORY:   Both Mother and Father, alive and Well. Father has Diabetes     SOCIAL HISTORY:  denies tobacco and social/recreational drug use. Admits to alcohol consumption 2 times a month, last drink prior to hospital presentation 2 days ago (Blood Alcohol of 228)     REVIEW OF SYSTEMS:    CONSTITUTIONAL: No weakness, fevers or chills  EYES/ENT: No visual changes;  No vertigo or throat pain   NECK: No pain or stiffness  RESPIRATORY: No cough, wheezing, No shortness of breath  CARDIOVASCULAR: No chest pain or palpitations  GASTROINTESTINAL: No abdominal or epigastric pain. No nausea, vomiting, No diarrhea or constipation.   GENITOURINARY: No dysuria, frequency or hematuria  NEUROLOGICAL: No numbness or weakness  SKIN: No itching, burning. Positive for lesions   PSYCH: No current suicidal ideations, positive for depression but says its improved today   All other review of systems is negative unless indicated above.    MEDICATIONS  (STANDING):  ARIPiprazole 5 milliGRAM(s) Oral daily  folic acid 1 milliGRAM(s) Oral daily  influenza   Vaccine 0.5 milliLiter(s) IntraMuscular once  multivitamin 1 Tablet(s) Oral daily  sertraline 100 milliGRAM(s) Oral daily  thiamine 100 milliGRAM(s) Oral daily    MEDICATIONS  (PRN):  diphenhydrAMINE   Injectable 50 milliGRAM(s) IntraMuscular every 6 hours PRN Agitation  haloperidol    Injectable 5 milliGRAM(s) IntraMuscular every 6 hours PRN Agitation  LORazepam     Tablet 0.5 milliGRAM(s) Oral every 4 hours PRN severe anxiety  LORazepam   Injectable 2 milliGRAM(s) IV Push every 2 hours PRN Symptom-triggered: 2 point increase in CIWA -Ar score and a total score of 7 or LESS  LORazepam   Injectable 2 milliGRAM(s) IntraMuscular every 6 hours PRN Agitation  traZODone 50 milliGRAM(s) Oral at bedtime PRN insomnia      VITALS:  T(F): 97.7 (21 @ 08:08), Max: 98.1 (21 @ 23:32)  HR: --85  BP: --138/93  RR: 12 (21 @ 08:08) (12 - 16)  SpO2: 99% (21 @ 08:08) (99% - 100%)  Wt(kg): --            PHYSICAL EXAM:  General: Alert, pleasant, cooperative. NAD. Well nourished and developed   PSYCH: Normal mood and affect. Calm and pleasant    HEENT:  pupils equal and reactive, EOMI, no oropharyngeal lesions, erythema, exudates, oral thrush  NECK:   supple, no palpable lymph nodes  CV:  +S1, +S2, regular, no murmurs or rubs  RESP:   lungs clear to auscultation bilaterally, no wheezing, rales, rhonchi, good air entry bilaterally, breathing with normal effort   GI:  abdomen soft, non-tender, non-distended, normal BS, no bruits, no abdominal masses, no palpable masses  MSK:   normal muscle tone, able to move all four extremities, hips, shoulders with no difficulty.   EXT:  no clubbing, no cyanosis, no edema, no calf pain, swelling or erythema  VASCULAR:  pulses equal and symmetric in the upper and lower extremities  NEURO:  AAOX3, no focal neurological deficits, follows all commands, able to move extremities spontaneously. Speaking in full sentences, answers questions appropriately. Normal gait   SKIN:  skin is warm, moist, color consistent with race. No diaphoresis. Multiple SCARS present b/l arms and right leg from prior self cutting     LABS:              135  |  102  |  13  ----------------------------<  127<H>  3.9   |  29  |  1.09    Ca    9.3      2021 08:46  Phos  3.7       Mg     2.1         TPro  7.8  /  Alb  4.1  /  TBili  0.8  /  DBili  0.2  /  AST  100<H>  /  ALT  153<H>  /  AlkPhos  51          LIVER FUNCTIONS - ( 2021 08:46 )  Alb: 4.1 g/dL / Pro: 7.8 gm/dL / ALK PHOS: 51 U/L / ALT: 153 U/L / AST: 100 U/L / GGT: x             Urinalysis Basic - ( 2021 23:07 )    Color: Yellow / Appearance: Clear / S.015 / pH: x  Gluc: x / Ketone: Negative  / Bili: Negative / Urobili: 4 mg/dL   Blood: x / Protein: Negative mg/dL / Nitrite: Negative   Leuk Esterase: Negative / RBC: 0-2 /HPF / WBC 0-2   Sq Epi: x / Non Sq Epi: x / Bacteria: Occasional        EKG: NSR @ 89 bpm   QT/QTC: 338/ 411 ms

## 2021-02-28 NOTE — BEHAVIORAL HEALTH ASSESSMENT NOTE - SUMMARY
21yo M, domiciled with mother, unemployed, non-caregiver, PPH of depression, borderline personality disorder, alcohol abuse, at least 2 prior psych hospitalizations, in 2019, and most recently in 2/2/2020 at [facility] for cutting in the setting of alcohol intoxication, currently in outpatient txt with psychiatrist Dr. Beasley, hx NSSIB and SA via asphyxiation w/plastic bag and cutting, no hx of aggressive or violent behavior, currently binge drinking alcohol, no history of detox/rehab or withdrawal symptoms, no legal issues who was BIBEMS again for cutting in the context of alcohol use. Patient has significant wounds from prior self cutting on bilateral arms and right leg.    Current presentation is similar to when patient was last admitted to [facility] in 2020: presents depressed, with increased drinking, increased cutting, and concerns for SI/SA that patient won't discuss. Moreover, appears to be high risk considering psych history. At this time, currently meets criteria for involuntary admission and could benefit from hospitalization for safety and stabilization via titration of medication.      Plan: admitted via telepsych  NO need for CO.  restart home meds  Completed labs Hba1C, lipid panel, TSH  Obtain PE (hospitalist consult).   Collateral info per primary team.  denies smoking,: last drink "last week Friday" - denies withdrawal and will put him in CIWA

## 2021-02-28 NOTE — BEHAVIORAL HEALTH ASSESSMENT NOTE - NS ED BHA DEMOGRAPHICS MEDICAL RECORD REVIEWED CONSENT OBTAINED YN
Erythromycin Counseling:  I discussed with the patient the risks of erythromycin including but not limited to GI upset, allergic reaction, drug rash, diarrhea, increase in liver enzymes, and yeast infections. Use Enhanced Medication Counseling?: No Birth Control Pills Pregnancy And Lactation Text: This medication should be avoided if pregnant and for the first 30 days post-partum. Benzoyl Peroxide Counseling: Patient counseled that medicine may cause skin irritation and bleach clothing.  In the event of skin irritation, the patient was advised to reduce the amount of the drug applied or use it less frequently.   The patient verbalized understanding of the proper use and possible adverse effects of benzoyl peroxide.  All of the patient's questions and concerns were addressed. Doxycycline Counseling:  Patient counseled regarding possible photosensitivity and increased risk for sunburn.  Patient instructed to avoid sunlight, if possible.  When exposed to sunlight, patients should wear protective clothing, sunglasses, and sunscreen.  The patient was instructed to call the office immediately if the following severe adverse effects occur:  hearing changes, easy bruising/bleeding, severe headache, or vision changes.  The patient verbalized understanding of the proper use and possible adverse effects of doxycycline.  All of the patient's questions and concerns were addressed. Doxycycline Pregnancy And Lactation Text: This medication is Pregnancy Category D and not consider safe during pregnancy. It is also excreted in breast milk but is considered safe for shorter treatment courses. Birth Control Pills Counseling: Birth Control Pill Counseling: I discussed with the patient the potential side effects of OCPs including but not limited to increased risk of stroke, heart attack, thrombophlebitis, deep venous thrombosis, hepatic adenomas, breast changes, GI upset, headaches, and depression.  The patient verbalized understanding of the proper use and possible adverse effects of OCPs. All of the patient's questions and concerns were addressed. Isotretinoin Counseling: Patient should get monthly blood tests, not donate blood, not drive at night if vision affected, not share medication, and not undergo elective surgery for 6 months after tx completed. Side effects reviewed, pt to contact office should one occur. Bactrim Pregnancy And Lactation Text: This medication is Pregnancy Category D and is known to cause fetal risk.  It is also excreted in breast milk. Spironolactone Pregnancy And Lactation Text: This medication can cause feminization of the male fetus and should be avoided during pregnancy. The active metabolite is also found in breast milk. Isotretinoin Pregnancy And Lactation Text: This medication is Pregnancy Category X and is considered extremely dangerous during pregnancy. It is unknown if it is excreted in breast milk. Minocycline Pregnancy And Lactation Text: This medication is Pregnancy Category D and not consider safe during pregnancy. It is also excreted in breast milk. Azithromycin Counseling:  I discussed with the patient the risks of azithromycin including but not limited to GI upset, allergic reaction, drug rash, diarrhea, and yeast infections. Dapsone Pregnancy And Lactation Text: This medication is Pregnancy Category C and is not considered safe during pregnancy or breast feeding. Tazorac Counseling:  Patient advised that medication is irritating and drying.  Patient may need to apply sparingly and wash off after an hour before eventually leaving it on overnight.  The patient verbalized understanding of the proper use and possible adverse effects of tazorac.  All of the patient's questions and concerns were addressed. Tetracycline Counseling: Patient counseled regarding possible photosensitivity and increased risk for sunburn.  Patient instructed to avoid sunlight, if possible.  When exposed to sunlight, patients should wear protective clothing, sunglasses, and sunscreen.  The patient was instructed to call the office immediately if the following severe adverse effects occur:  hearing changes, easy bruising/bleeding, severe headache, or vision changes.  The patient verbalized understanding of the proper use and possible adverse effects of tetracycline.  All of the patient's questions and concerns were addressed. Patient understands to avoid pregnancy while on therapy due to potential birth defects. Topical Retinoid counseling:  Patient advised to apply a pea-sized amount only at bedtime and wait 30 minutes after washing their face before applying.  If too drying, patient may add a non-comedogenic moisturizer. The patient verbalized understanding of the proper use and possible adverse effects of retinoids.  All of the patient's questions and concerns were addressed. Minocycline Counseling: Patient advised regarding possible photosensitivity and discoloration of the teeth, skin, lips, tongue and gums.  Patient instructed to avoid sunlight, if possible.  When exposed to sunlight, patients should wear protective clothing, sunglasses, and sunscreen.  The patient was instructed to call the office immediately if the following severe adverse effects occur:  hearing changes, easy bruising/bleeding, severe headache, or vision changes.  The patient verbalized understanding of the proper use and possible adverse effects of minocycline.  All of the patient's questions and concerns were addressed. Topical Sulfur Applications Pregnancy And Lactation Text: This medication is Pregnancy Category C and has an unknown safety profile during pregnancy. It is unknown if this topical medication is excreted in breast milk. Azithromycin Pregnancy And Lactation Text: This medication is considered safe during pregnancy and is also secreted in breast milk. Detail Level: Detailed Spironolactone Counseling: Patient advised regarding risks of diarrhea, abdominal pain, hyperkalemia, birth defects (for female patients), liver toxicity and renal toxicity. The patient may need blood work to monitor liver and kidney function and potassium levels while on therapy. The patient verbalized understanding of the proper use and possible adverse effects of spironolactone.  All of the patient's questions and concerns were addressed. Dapsone Counseling: I discussed with the patient the risks of dapsone including but not limited to hemolytic anemia, agranulocytosis, rashes, methemoglobinemia, kidney failure, peripheral neuropathy, headaches, GI upset, and liver toxicity.  Patients who start dapsone require monitoring including baseline LFTs and weekly CBCs for the first month, then every month thereafter.  The patient verbalized understanding of the proper use and possible adverse effects of dapsone.  All of the patient's questions and concerns were addressed. Topical Sulfur Applications Counseling: Topical Sulfur Counseling: Patient counseled that this medication may cause skin irritation or allergic reactions.  In the event of skin irritation, the patient was advised to reduce the amount of the drug applied or use it less frequently.   The patient verbalized understanding of the proper use and possible adverse effects of topical sulfur application.  All of the patient's questions and concerns were addressed. Topical Retinoid Pregnancy And Lactation Text: This medication is Pregnancy Category C. It is unknown if this medication is excreted in breast milk. High Dose Vitamin A Counseling: Side effects reviewed, pt to contact office should one occur. Tazorac Pregnancy And Lactation Text: This medication is not safe during pregnancy. It is unknown if this medication is excreted in breast milk. Benzoyl Peroxide Pregnancy And Lactation Text: This medication is Pregnancy Category C. It is unknown if benzoyl peroxide is excreted in breast milk. Topical Clindamycin Pregnancy And Lactation Text: This medication is Pregnancy Category B and is considered safe during pregnancy. It is unknown if it is excreted in breast milk. Erythromycin Pregnancy And Lactation Text: This medication is Pregnancy Category B and is considered safe during pregnancy. It is also excreted in breast milk. Bactrim Counseling:  I discussed with the patient the risks of sulfa antibiotics including but not limited to GI upset, allergic reaction, drug rash, diarrhea, dizziness, photosensitivity, and yeast infections.  Rarely, more serious reactions can occur including but not limited to aplastic anemia, agranulocytosis, methemoglobinemia, blood dyscrasias, liver or kidney failure, lung infiltrates or desquamative/blistering drug rashes. Topical Clindamycin Counseling: Patient counseled that this medication may cause skin irritation or allergic reactions.  In the event of skin irritation, the patient was advised to reduce the amount of the drug applied or use it less frequently.   The patient verbalized understanding of the proper use and possible adverse effects of clindamycin.  All of the patient's questions and concerns were addressed. High Dose Vitamin A Pregnancy And Lactation Text: High dose vitamin A therapy is contraindicated during pregnancy and breast feeding. No

## 2021-02-28 NOTE — BEHAVIORAL HEALTH ASSESSMENT NOTE - NSBHREFERDETAILS_PSY_A_CORE_FT
TELEPSYCHIATRY REASSESSMENT:    S/O:    Patient updated on tentative plan for transfer to . He continues to inquire about length of admission and otherwise appears entirely apathetic to the transfer process, location and such.   Patient informed mother will be updated and he expressed agreement / indifference.   Message left for mother (no identifiers) at [phone] with transport plan, ETA;               5N unit phone #; [facility] ED phone #; and Telepsychiatry phone #.     A/P:  As per Dr. Sanderson; 21yo M, domiciled with mother, unemployed, non-caregiver, PPH of depression, borderline personality disorder, alcohol abuse, at least 2 prior psych hospitalizations, in 2019, and most recently in 2/2/2020 at [facility] for cutting in the setting of alcohol intoxication, currently in outpatient txt with psychiatrist Dr. Beasley, hx NSSIB and SA via asphyxiation w/plastic bag and cutting, no hx of aggressive or violent behavior, currently binge drinking alcohol, no history of detox/rehab or withdrawal symptoms, no legal issues who was BIBEMS again for cutting in the context of alcohol use. Patient has significant wounds from prior self cutting on bilateral arms and right leg. Current presentation is similar to when patient was last admitted to [facility] in 2020: presents depressed, with increased drinking, increased cutting, and concerns for SI/SA that patient won't discuss. Moreover, appears to be high risk considering psych history. At this time, currently meets criteria for involuntary admission and could benefit from hospitalization for safety and stabilization via titration of medication.    Admit to inpatient psychiatry: Location [facility]     Legal Status: 9.27 (Involuntary; 2PC)    Need for one to one on psychiatric unit? No   Referent to ED updated? Yes.

## 2021-02-28 NOTE — CONSULT NOTE ADULT - ASSESSMENT
ASSESSMENT: 21 y/o M with past psychiatric history of MDD, borderline personality disorder, alcohol abuse, multiple prior psychiatric admissions, with history of SA in the past and hx of self cutting presents for involuntary inpatient psych admission following an episode of self cutting in the setting of alcohol intoxication.     PLAN:   1. PSYCH/ SI/SA/MDD   -psychiatric stabilization per primary team   -suicide precautions   -no sharps, drawstrings, shoe laces     2. Alcohol Intoxication   -No signs of withdrawal   -On CIWA protocol with Ativan prn   -Current CIWA -0   -Folic Acid, Thiamine, multivitamin     3. Transaminitis   -in setting of recent alcohol intoxication   -no abdominal pain   -no fever, leukocytosis   -Monitor for now, needs repeat LFTs with outpatient f/u w/ PMD     Encourage ambulation and adequate hydration     d/w patient, psych RN and Attending Physician

## 2021-02-28 NOTE — BEHAVIORAL HEALTH ASSESSMENT NOTE - NSBHCHARTREVIEWINVESTIGATE_PSY_A_CORE FT
Ventricular Rate 89 BPM    Atrial Rate 89 BPM    P-R Interval 166 ms    QRS Duration 84 ms    Q-T Interval 338 ms    QTC Calculation(Bazett) 411 ms    P Axis 71 degrees    R Axis 84 degrees    T Axis 72 degrees    Diagnosis Line Normal sinus rhythm  Early repolarization  Normal ECG  No previous ECGs available

## 2021-02-28 NOTE — BEHAVIORAL HEALTH ASSESSMENT NOTE - SUICIDE RISK FACTORS
Current mood episode/Alcohol/Substance abuse disorders/Access to lethal methods (pills, firearm, etc.: Ask specifically about presence or absence of a firearm in the home or ease of accessing

## 2021-02-28 NOTE — BEHAVIORAL HEALTH ASSESSMENT NOTE - NSBHCHARTREVIEWLAB_PSY_A_CORE FT
14.7   2.94  )-----------( 214      ( 26 Feb 2021 20:16 )             43.1     02-26    139  |  104  |  12  ----------------------------<  95  4.0   |  28  |  0.95    Ca    8.4<L>      26 Feb 2021 20:16    TPro  7.7  /  Alb  4.0  /  TBili  0.4  /  DBili  x   /  AST  110<H>  /  ALT  152<H>  /  AlkPhos  51  02-26

## 2021-02-28 NOTE — BEHAVIORAL HEALTH ASSESSMENT NOTE - NSBHCHARTREVIEWVS_PSY_A_CORE FT
Vital Signs Last 24 Hrs  T(C): 36.5 (28 Feb 2021 08:08), Max: 37 (27 Feb 2021 16:25)  T(F): 97.7 (28 Feb 2021 08:08), Max: 98.6 (27 Feb 2021 16:25)  HR: 71 (27 Feb 2021 21:15) (71 - 91)  BP: 132/87 (27 Feb 2021 21:15) (117/75 - 132/87)  BP(mean): --  RR: 12 (28 Feb 2021 08:08) (12 - 18)  SpO2: 99% (28 Feb 2021 08:08) (96% - 100%)

## 2021-03-01 LAB
A1C WITH ESTIMATED AVERAGE GLUCOSE RESULT: 5.5 % — SIGNIFICANT CHANGE UP (ref 4–5.6)
ANION GAP SERPL CALC-SCNC: 4 MMOL/L — LOW (ref 5–17)
BUN SERPL-MCNC: 17 MG/DL — SIGNIFICANT CHANGE UP (ref 7–23)
CALCIUM SERPL-MCNC: 9.9 MG/DL — SIGNIFICANT CHANGE UP (ref 8.5–10.1)
CHLORIDE SERPL-SCNC: 105 MMOL/L — SIGNIFICANT CHANGE UP (ref 96–108)
CHOLEST SERPL-MCNC: 208 MG/DL — HIGH
CO2 SERPL-SCNC: 29 MMOL/L — SIGNIFICANT CHANGE UP (ref 22–31)
CREAT SERPL-MCNC: 1.08 MG/DL — SIGNIFICANT CHANGE UP (ref 0.5–1.3)
ESTIMATED AVERAGE GLUCOSE: 111 MG/DL — SIGNIFICANT CHANGE UP (ref 68–114)
GLUCOSE SERPL-MCNC: 94 MG/DL — SIGNIFICANT CHANGE UP (ref 70–99)
HDLC SERPL-MCNC: 77 MG/DL — SIGNIFICANT CHANGE UP
LIPID PNL WITH DIRECT LDL SERPL: 101 MG/DL — HIGH
MAGNESIUM SERPL-MCNC: 2.4 MG/DL — SIGNIFICANT CHANGE UP (ref 1.6–2.6)
NON HDL CHOLESTEROL: 131 MG/DL — HIGH
PHOSPHATE SERPL-MCNC: 4.2 MG/DL — SIGNIFICANT CHANGE UP (ref 2.5–4.5)
POTASSIUM SERPL-MCNC: 5 MMOL/L — SIGNIFICANT CHANGE UP (ref 3.5–5.3)
POTASSIUM SERPL-SCNC: 5 MMOL/L — SIGNIFICANT CHANGE UP (ref 3.5–5.3)
SODIUM SERPL-SCNC: 138 MMOL/L — SIGNIFICANT CHANGE UP (ref 135–145)
TRIGL SERPL-MCNC: 150 MG/DL — HIGH

## 2021-03-01 PROCEDURE — 99221 1ST HOSP IP/OBS SF/LOW 40: CPT

## 2021-03-01 PROCEDURE — 99223 1ST HOSP IP/OBS HIGH 75: CPT

## 2021-03-01 RX ORDER — LITHIUM CARBONATE 300 MG/1
300 TABLET, EXTENDED RELEASE ORAL
Refills: 0 | Status: DISCONTINUED | OUTPATIENT
Start: 2021-03-01 | End: 2021-03-07

## 2021-03-01 RX ORDER — NALTREXONE HYDROCHLORIDE 50 MG/1
50 TABLET, FILM COATED ORAL DAILY
Refills: 0 | Status: DISCONTINUED | OUTPATIENT
Start: 2021-03-01 | End: 2021-03-08

## 2021-03-01 RX ADMIN — Medication 1 MILLIGRAM(S): at 10:13

## 2021-03-01 RX ADMIN — Medication 0.5 MILLIGRAM(S): at 13:54

## 2021-03-01 RX ADMIN — Medication 100 MILLIGRAM(S): at 10:13

## 2021-03-01 RX ADMIN — NALTREXONE HYDROCHLORIDE 50 MILLIGRAM(S): 50 TABLET, FILM COATED ORAL at 18:22

## 2021-03-01 RX ADMIN — LITHIUM CARBONATE 300 MILLIGRAM(S): 300 TABLET, EXTENDED RELEASE ORAL at 21:26

## 2021-03-01 RX ADMIN — Medication 0.5 MILLIGRAM(S): at 00:23

## 2021-03-01 RX ADMIN — SERTRALINE 100 MILLIGRAM(S): 25 TABLET, FILM COATED ORAL at 10:13

## 2021-03-01 RX ADMIN — Medication 1 TABLET(S): at 10:13

## 2021-03-01 RX ADMIN — ARIPIPRAZOLE 5 MILLIGRAM(S): 15 TABLET ORAL at 10:13

## 2021-03-01 NOTE — PROGRESS NOTE BEHAVIORAL HEALTH - NSBHFUPADDHPIFT_PSY_A_CORE
ARIPiprazole 10 mg oral tablet: Last Dose Taken:  , 1 tab(s) orally once a day (at bedtime)  · 	Zoloft 100 mg oral tablet: Last Dose Taken:  , 1 tab(s) orally once a day   · 	Zoloft 50 mg oral tablet: L

## 2021-03-01 NOTE — PROGRESS NOTE BEHAVIORAL HEALTH - NSBHADDHXPSYCHFT_PSY_A_CORE
at least 2 prior psych hospitalizations, in 2019, and most recently in 2/2/2020 at [facility] for cutting in the setting of alcohol intoxication,  outpatient txt with psychiatrist Dr. Beasley,   [facility] program, [id number] 2018 reported auditory hallucinations  Pt sees therapists twice a week
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
## 2021-03-02 DIAGNOSIS — F60.3 BORDERLINE PERSONALITY DISORDER: ICD-10-CM

## 2021-03-02 DIAGNOSIS — F32.9 MAJOR DEPRESSIVE DISORDER, SINGLE EPISODE, UNSPECIFIED: ICD-10-CM

## 2021-03-02 PROCEDURE — 99232 SBSQ HOSP IP/OBS MODERATE 35: CPT

## 2021-03-02 RX ADMIN — Medication 0.5 MILLIGRAM(S): at 09:09

## 2021-03-02 RX ADMIN — Medication 0.5 MILLIGRAM(S): at 01:53

## 2021-03-02 RX ADMIN — Medication 1 TABLET(S): at 09:09

## 2021-03-02 RX ADMIN — Medication 50 MILLIGRAM(S): at 01:53

## 2021-03-02 RX ADMIN — NALTREXONE HYDROCHLORIDE 50 MILLIGRAM(S): 50 TABLET, FILM COATED ORAL at 09:09

## 2021-03-02 RX ADMIN — SERTRALINE 100 MILLIGRAM(S): 25 TABLET, FILM COATED ORAL at 09:09

## 2021-03-02 RX ADMIN — Medication 100 MILLIGRAM(S): at 09:09

## 2021-03-02 RX ADMIN — LITHIUM CARBONATE 300 MILLIGRAM(S): 300 TABLET, EXTENDED RELEASE ORAL at 09:09

## 2021-03-02 RX ADMIN — Medication 50 MILLIGRAM(S): at 20:32

## 2021-03-02 RX ADMIN — Medication 1 MILLIGRAM(S): at 09:09

## 2021-03-02 RX ADMIN — LITHIUM CARBONATE 300 MILLIGRAM(S): 300 TABLET, EXTENDED RELEASE ORAL at 20:31

## 2021-03-02 NOTE — PROGRESS NOTE BEHAVIORAL HEALTH - NSBHFUPIPCHARTREVFT_PSY_A_CORE
20 yr old single Unemployed   male, hx of depression  h/o suicide attempt (asphyxiation with bag and cutting wrists), +cutting on bilateral arms and right leg., Pt for the past two weeks of resuming cutting self, feeling depressed, having increased suicidal ideation , and consuming more alcohol..
20 yr old single Unemployed   male, hx of depression  h/o suicide attempt (asphyxiation with bag and cutting wrists), +cutting on bilateral arms and right leg., Pt for the past two weeks of resuming cutting self, feeling depressed, having increased suicidal ideation , and consuming more alcohol..

## 2021-03-03 PROCEDURE — 99232 SBSQ HOSP IP/OBS MODERATE 35: CPT

## 2021-03-03 RX ADMIN — Medication 50 MILLIGRAM(S): at 20:55

## 2021-03-03 RX ADMIN — LITHIUM CARBONATE 300 MILLIGRAM(S): 300 TABLET, EXTENDED RELEASE ORAL at 10:09

## 2021-03-03 RX ADMIN — NALTREXONE HYDROCHLORIDE 50 MILLIGRAM(S): 50 TABLET, FILM COATED ORAL at 10:09

## 2021-03-03 RX ADMIN — LITHIUM CARBONATE 300 MILLIGRAM(S): 300 TABLET, EXTENDED RELEASE ORAL at 20:53

## 2021-03-03 RX ADMIN — SERTRALINE 100 MILLIGRAM(S): 25 TABLET, FILM COATED ORAL at 10:09

## 2021-03-03 RX ADMIN — Medication 0.5 MILLIGRAM(S): at 12:30

## 2021-03-03 RX ADMIN — Medication 100 MILLIGRAM(S): at 10:09

## 2021-03-03 RX ADMIN — Medication 1 TABLET(S): at 10:09

## 2021-03-03 RX ADMIN — Medication 1 MILLIGRAM(S): at 10:09

## 2021-03-04 PROCEDURE — 99232 SBSQ HOSP IP/OBS MODERATE 35: CPT

## 2021-03-04 RX ADMIN — LITHIUM CARBONATE 300 MILLIGRAM(S): 300 TABLET, EXTENDED RELEASE ORAL at 21:12

## 2021-03-04 RX ADMIN — LITHIUM CARBONATE 300 MILLIGRAM(S): 300 TABLET, EXTENDED RELEASE ORAL at 09:38

## 2021-03-04 RX ADMIN — Medication 0.5 MILLIGRAM(S): at 17:32

## 2021-03-04 RX ADMIN — NALTREXONE HYDROCHLORIDE 50 MILLIGRAM(S): 50 TABLET, FILM COATED ORAL at 09:38

## 2021-03-04 RX ADMIN — Medication 1 MILLIGRAM(S): at 09:38

## 2021-03-04 RX ADMIN — Medication 1 TABLET(S): at 09:38

## 2021-03-04 RX ADMIN — SERTRALINE 100 MILLIGRAM(S): 25 TABLET, FILM COATED ORAL at 09:38

## 2021-03-05 PROCEDURE — 99232 SBSQ HOSP IP/OBS MODERATE 35: CPT

## 2021-03-05 RX ADMIN — LITHIUM CARBONATE 300 MILLIGRAM(S): 300 TABLET, EXTENDED RELEASE ORAL at 09:24

## 2021-03-05 RX ADMIN — Medication 1 TABLET(S): at 09:24

## 2021-03-05 RX ADMIN — SERTRALINE 100 MILLIGRAM(S): 25 TABLET, FILM COATED ORAL at 09:24

## 2021-03-05 RX ADMIN — Medication 50 MILLIGRAM(S): at 21:38

## 2021-03-05 RX ADMIN — NALTREXONE HYDROCHLORIDE 50 MILLIGRAM(S): 50 TABLET, FILM COATED ORAL at 09:23

## 2021-03-05 RX ADMIN — Medication 1 MILLIGRAM(S): at 09:24

## 2021-03-05 RX ADMIN — LITHIUM CARBONATE 300 MILLIGRAM(S): 300 TABLET, EXTENDED RELEASE ORAL at 21:38

## 2021-03-05 NOTE — DISCHARGE NOTE BEHAVIORAL HEALTH - FAMILY HISTORY OF PSYCHIATRIC ILLNESS
Per 5n hx, pt states his mother has depression, borderline personality disorder, and narcissistic personaity disorder. Per 5n hx, pt states his mother has depression, borderline personality disorder, and narcissistic personality disorder.

## 2021-03-05 NOTE — DISCHARGE NOTE BEHAVIORAL HEALTH - NSBHDCADDFT_PSY_A_CORE
HgA1c 5.5  03-01 Chol 208<H> LDL -- HDL 77 Trig 150<H>\QRS axis to [] ° and NSR at a rate of [] BPM. There was no atrial enlargement. There was no ventricular hypertrophy. There were no ST-T changes and all intervals were normal.  Lithium Level, Serum: 0.4 mmol/L (03.06.21 @ 09:44) for lithium 600mg          pt needing lithium level for 900mg

## 2021-03-05 NOTE — DISCHARGE NOTE BEHAVIORAL HEALTH - MEDICATION SUMMARY - MEDICATIONS TO TAKE
I will START or STAY ON the medications listed below when I get home from the hospital:    sertraline 100 mg oral tablet  -- 1 tab(s) by mouth once a day  -- Indication: For Depression    traZODone 50 mg oral tablet  -- 1 tab(s) by mouth once a day (at bedtime), As needed, insomnia  -- Indication: For insomnia    lithium 300 mg oral capsule  -- 1 cap(s) by mouth 3 times a day  -- Indication: For Depression    naltrexone 50 mg oral tablet  -- 1 tab(s) by mouth once a day  -- Indication: For alcohol

## 2021-03-05 NOTE — DISCHARGE NOTE BEHAVIORAL HEALTH - NSBHDCTHERAPYFT_PSY_A_CORE
Pt provided with individual and group therapies including safety planning and coping skills, along with psychoeducation related to diagnosis.

## 2021-03-05 NOTE — DISCHARGE NOTE BEHAVIORAL HEALTH - MEDICATION SUMMARY - MEDICATIONS TO STOP TAKING
I will STOP taking the medications listed below when I get home from the hospital:    Zoloft 50 mg oral tablet  -- 1 tab(s) by mouth once a day    ARIPiprazole 10 mg oral tablet  -- 1 tab(s) by mouth once a day (at bedtime)

## 2021-03-05 NOTE — DISCHARGE NOTE BEHAVIORAL HEALTH - NSBHDCALCOHOLREFERFT_PSY_A_CORE
Patient to address issues related to substance abuse in treatment at [facility].

## 2021-03-05 NOTE — DISCHARGE NOTE BEHAVIORAL HEALTH - NSBHDCCRISISPLAN1FT_PSY_A_CORE
Tell a trusted friend/family member, tell housing staff, tell clinic staff, call a crisis line ( Crisis Center ph. [phone], [facility] DASH [phone], [facility] (peer support) ph. [phone]), return to the nearest emergency room. You may call 9-1-1 for assistance.

## 2021-03-05 NOTE — DISCHARGE NOTE BEHAVIORAL HEALTH - NSBHDCCRISISPLAN3FT_PSY_A_CORE
Discuss in treatment with counselor, attended a local/online self-help group, call a hotline ([facility] (Substance Abuse and Mental [facility])[phone])

## 2021-03-05 NOTE — DISCHARGE NOTE BEHAVIORAL HEALTH - NSBHDCCRISISPLAN2FT_PSY_A_CORE
Call [facility] 5N: [phone]  : Yulia Dc LMSW  Psychiatrists- Dr. Paris Portillo

## 2021-03-05 NOTE — DISCHARGE NOTE BEHAVIORAL HEALTH - CONDITIONS AT DISCHARGE
Pt discharged home today, alert and oriented x4, reports improvement of symptoms since admission to 5N. He currently denies SI/HI, AH/VH. He is aware of who to call or to return to ED if symptoms worsen or he becomes suicidal. He verbalized understanding of all discharge and medication instructions, including covid-19 education. He left with all belongings, in appropriate footwear and attire, in stable condition.

## 2021-03-05 NOTE — DISCHARGE NOTE BEHAVIORAL HEALTH - NSBHDCSUBSTHXFT_PSY_A_CORE
Per pt's ED BH Assessment completed on 2/27/21 by Dr. Maryan Sanderson: "19yo M, domiciled with mother, unemployed, non-caregiver, PPH of depression, borderline personality disorder, alcohol abuse, at least 2 prior psych hospitalizations, in 2019, and most recently in 2/2/2020 at [facility] for cutting in the setting of alcohol intoxication, currently in outpatient txt with psychiatrist Dr. Beasley, hx NSSIB and SA via asphyxiation w/plastic bag and cutting, no hx of aggressive or violent behavior, currently binge drinking alcohol, no history of detox/rehab or withdrawal symptoms, no legal issues who was BIBEMS again for cutting in the context of alcohol use. Patient has significant wounds from prior self cutting on bilateral arms and right leg.    On interview patient was guarded, with constricted affect, minimally answering questions. Stated that he had been drinking 2L of vodka. Last cut last night, and when asked why of if there is a trigger, replied "I don't know." Before that cut several months ago. Patient states that he has been feeling more depressed and low energy. Presented apathetic as well. Although he stated "I'm fine," wanted to go home, and denied SI/HI increased drinking and cutting are of concern. Patient denied any AH or VH. He denied thoughts of harming others. Could not complete any safety plan, was difficult to engage. Currently is on Zoloft 50mg only. Does not recall when he saw psychiatrist last.     Of note, reviewed discharge summary from 2/12/2020 [facility]. At the time, pt was dx with Alcohol Dependence and MDD. Medications were Abilify 10mg daily, naltrexone 50mg daily, and Zoloft 150mg daily." Per pt's 5N hx, pt reports drinking alcohol a few times a month but does not feel he has a drinking problem. no withdrawal symptoms. Per pt's 5N hx, pt reports drinking alcohol a few times a month but does not feel he has a drinking problem. no withdrawal symptoms.  binge drinking alcohol drinking 2L of vodka., no history of detox/rehab

## 2021-03-05 NOTE — DISCHARGE NOTE BEHAVIORAL HEALTH - PROVIDER TOKENS
FREE:[LAST:[[facility]],PHONE:[(   )    -],FAX:[(   )    -]]

## 2021-03-05 NOTE — DISCHARGE NOTE BEHAVIORAL HEALTH - NSBHDCSUICFCTRSFT_PSY_A_CORE
1. depression pt on zoloft and lithium Pt is directed to continue with all medications until directed by his MD to change or discontinued with medications  2. suicidal ideation - pt  denies any intent or plan   3. pt denies any need for rehab treatment

## 2021-03-05 NOTE — DISCHARGE NOTE BEHAVIORAL HEALTH - NSBHDCREFEROTHERFT_PSY_A_CORE
[facility]- for psychiatric crises (available AFTER HOURS)  24/7 hotline: [phone]  Address: [address] [place] [address] [place], [address] Crisis Center: [phone]  *[facility] Crisis Center for psychiatric needs. Open 9am-5pm, M-F. [facility]  DBT Treatment   [address] [place] [address]  [phone]  * A referral was made to the program. They will reach out to you for a phone screening. If you would like to follow up for a status update, call the number above.     [facility]- for psychiatric crises (available AFTER HOURS)  24/7 hotline: [phone]  Address: [address] [place] [address] [address] Crisis Center: [phone]  *[facility] Crisis Center for psychiatric needs. Open 9am-5pm, M-F.

## 2021-03-05 NOTE — DISCHARGE NOTE BEHAVIORAL HEALTH - NSBHDCRESPONSEFT_PSY_A_CORE
improved with increased in goal directed speech  Pt denies any suicidal ideation intent or plan Pt denies any side effect from medication

## 2021-03-05 NOTE — DISCHARGE NOTE BEHAVIORAL HEALTH - NSBHDCSUICSAFETYFT_PSY_A_CORE
Advised to return to hospital or go to nearest ED or call 911 or [phone]) LIFENET or [phone] hotlines for any severe, worsening or persistent symptoms including suicidal/homicidal ideations, intent or plans. Patient verbalized understanding of instructions.

## 2021-03-05 NOTE — DISCHARGE NOTE BEHAVIORAL HEALTH - HPI (INCLUDE ILLNESS QUALITY, SEVERITY, DURATION, TIMING, CONTEXT, MODIFYING FACTORS, ASSOCIATED SIGNS AND SYMPTOMS)
Per pt's ED BH Assessment completed on 2/27/21 by Dr. Maryan Sanderson: "21yo M, domiciled with mother, unemployed, non-caregiver, PPH of depression, borderline personality disorder, alcohol abuse, at least 2 prior psych hospitalizations, in 2019, and most recently in 2/2/2020 at [facility] for cutting in the setting of alcohol intoxication, currently in outpatient txt with psychiatrist Dr. Beasley, hx NSSIB and SA via asphyxiation w/plastic bag and cutting, no hx of aggressive or violent behavior, currently binge drinking alcohol, no history of detox/rehab or withdrawal symptoms, no legal issues who was BIBEMS again for cutting in the context of alcohol use. Patient has significant wounds from prior self cutting on bilateral arms and right leg.    On interview patient was guarded, with constricted affect, minimally answering questions. Stated that he had been drinking 2L of vodka. Last cut last night, and when asked why of if there is a trigger, replied "I don't know." Before that cut several months ago. Patient states that he has been feeling more depressed and low energy. Presented apathetic as well. Although he stated "I'm fine," wanted to go home, and denied SI/HI increased drinking and cutting are of concern. Patient denied any AH or VH. He denied thoughts of harming others. Could not complete any safety plan, was difficult to engage. Currently is on Zoloft 50mg only. Does not recall when he saw psychiatrist last.     Of note, reviewed discharge summary from 2/12/2020 [facility]. At the time, pt was dx with Alcohol Dependence and MDD. Medications were Abilify 10mg daily, naltrexone 50mg daily, and Zoloft 150mg daily."

## 2021-03-05 NOTE — DISCHARGE NOTE BEHAVIORAL HEALTH - NSBHDCPURPOSE1FT_PSY_A_CORE
Outpatient mental health treatment. You have an appointment via Zoom on 3/9/21 at 11AM with Dr. Psoada. Per Dr. Posada, use the Zoom meeting ID previously provided to you.

## 2021-03-05 NOTE — DISCHARGE NOTE BEHAVIORAL HEALTH - PAST PSYCHIATRIC HISTORY
Per pt's ED BH Assessment completed on 2/27/21 by Dr. Maryan Sanderson: "I don't know" Per pt's 5N hx, one past admission Per pt's 5N hx, one past admission last year at [facility] due to SA. Per pt's 5N hx, one past admission last year at [facility] due to SA.  at least 2 prior psych hospitalizations, in 2019, and most recently in 2/2/2020 at [facility] for cutting in the setting of alcohol intoxication,  outpatient txt with psychiatrist Dr. Beasley,   [facility] program, [id number] 2018 reported auditory hallucinations  Pt sees therapists twice a week

## 2021-03-05 NOTE — DISCHARGE NOTE BEHAVIORAL HEALTH - NSBHDCRESOURCESOTHERFT_PSY_A_CORE
[facility]- for psychiatric crises (available AFTER HOURS)  24/7 hotline: [phone]  Address:  [address] [facility]: [phone]  *Walk In Crisis Center for psychiatric needs. Open 9am-5pm, M-F.    [facility]  *Can meet online   [email or web address]     Find a local AA group:  [facility]  [email or web address]  [phone]    [phone] ([phone] or [email or web address]

## 2021-03-05 NOTE — DISCHARGE NOTE BEHAVIORAL HEALTH - CARE PROVIDER_API CALL
[facility],   Phone: (   )    -  Fax: (   )    -  Follow Up Time:

## 2021-03-06 LAB — LITHIUM SERPL-MCNC: 0.4 MMOL/L — LOW (ref 0.6–1.2)

## 2021-03-06 RX ADMIN — Medication 1 MILLIGRAM(S): at 09:30

## 2021-03-06 RX ADMIN — SERTRALINE 100 MILLIGRAM(S): 25 TABLET, FILM COATED ORAL at 09:30

## 2021-03-06 RX ADMIN — Medication 1 TABLET(S): at 09:30

## 2021-03-06 RX ADMIN — LITHIUM CARBONATE 300 MILLIGRAM(S): 300 TABLET, EXTENDED RELEASE ORAL at 21:39

## 2021-03-06 RX ADMIN — Medication 0.5 MILLIGRAM(S): at 21:39

## 2021-03-06 RX ADMIN — NALTREXONE HYDROCHLORIDE 50 MILLIGRAM(S): 50 TABLET, FILM COATED ORAL at 09:30

## 2021-03-06 RX ADMIN — LITHIUM CARBONATE 300 MILLIGRAM(S): 300 TABLET, EXTENDED RELEASE ORAL at 09:30

## 2021-03-06 RX ADMIN — Medication 50 MILLIGRAM(S): at 21:39

## 2021-03-07 PROCEDURE — 99232 SBSQ HOSP IP/OBS MODERATE 35: CPT

## 2021-03-07 RX ORDER — LITHIUM CARBONATE 300 MG/1
300 TABLET, EXTENDED RELEASE ORAL THREE TIMES A DAY
Refills: 0 | Status: DISCONTINUED | OUTPATIENT
Start: 2021-03-07 | End: 2021-03-08

## 2021-03-07 RX ADMIN — NALTREXONE HYDROCHLORIDE 50 MILLIGRAM(S): 50 TABLET, FILM COATED ORAL at 09:58

## 2021-03-07 RX ADMIN — LITHIUM CARBONATE 300 MILLIGRAM(S): 300 TABLET, EXTENDED RELEASE ORAL at 09:58

## 2021-03-07 RX ADMIN — SERTRALINE 100 MILLIGRAM(S): 25 TABLET, FILM COATED ORAL at 09:58

## 2021-03-07 RX ADMIN — Medication 1 TABLET(S): at 09:58

## 2021-03-07 RX ADMIN — Medication 1 MILLIGRAM(S): at 09:58

## 2021-03-07 RX ADMIN — LITHIUM CARBONATE 300 MILLIGRAM(S): 300 TABLET, EXTENDED RELEASE ORAL at 20:28

## 2021-03-08 VITALS — OXYGEN SATURATION: 100 % | RESPIRATION RATE: 14 BRPM | TEMPERATURE: 98 F

## 2021-03-08 DIAGNOSIS — F10.10 ALCOHOL ABUSE, UNCOMPLICATED: ICD-10-CM

## 2021-03-08 PROCEDURE — 99239 HOSP IP/OBS DSCHRG MGMT >30: CPT

## 2021-03-08 RX ORDER — NALTREXONE HYDROCHLORIDE 50 MG/1
1 TABLET, FILM COATED ORAL
Qty: 14 | Refills: 1
Start: 2021-03-08 | End: 2021-04-04

## 2021-03-08 RX ORDER — LITHIUM CARBONATE 300 MG/1
1 TABLET, EXTENDED RELEASE ORAL
Qty: 42 | Refills: 1
Start: 2021-03-08 | End: 2021-04-04

## 2021-03-08 RX ORDER — TRAZODONE HCL 50 MG
1 TABLET ORAL
Qty: 14 | Refills: 1
Start: 2021-03-08 | End: 2021-04-04

## 2021-03-08 RX ORDER — SERTRALINE 25 MG/1
1 TABLET, FILM COATED ORAL
Qty: 14 | Refills: 1
Start: 2021-03-08 | End: 2021-04-04

## 2021-03-08 RX ADMIN — LITHIUM CARBONATE 300 MILLIGRAM(S): 300 TABLET, EXTENDED RELEASE ORAL at 14:19

## 2021-03-08 RX ADMIN — SERTRALINE 100 MILLIGRAM(S): 25 TABLET, FILM COATED ORAL at 09:20

## 2021-03-08 RX ADMIN — Medication 1 TABLET(S): at 09:20

## 2021-03-08 RX ADMIN — Medication 1 MILLIGRAM(S): at 09:20

## 2021-03-08 RX ADMIN — NALTREXONE HYDROCHLORIDE 50 MILLIGRAM(S): 50 TABLET, FILM COATED ORAL at 09:20

## 2021-03-08 RX ADMIN — LITHIUM CARBONATE 300 MILLIGRAM(S): 300 TABLET, EXTENDED RELEASE ORAL at 09:20

## 2021-03-08 NOTE — PROGRESS NOTE BEHAVIORAL HEALTH - AFFECT CONGRUENCE
Prescription approved per [facility] Refill Protocol.  Candy ARRIETA RN    
Congruent

## 2021-03-08 NOTE — PROGRESS NOTE BEHAVIORAL HEALTH - RISK ASSESSMENT
moderate risk  Acute: depression, suicidal ideation  mitigating : denies any suicidal intent or plan   protective : support of family  chronic: prior psych hosp, alcohol binges, self mutilating behavior
moderate risk  Acute: depression, suicidal ideation  mitigating : denies any suicidal intent or plan   protective : support of family  chronic: prior psych hosp, alcohol binges, self mutilating behavior
low risk  Acute: pt denies depression, denies suicidal ideation  mitigating : denies any suicidal intent or plan , pt verbalized willing to attend aftercare treatment   protective : support of family  chronic: prior psych hosp, alcohol binges, self mutilating behavior
moderate risk  Acute: depression, suicidal ideation  mitigating : denies any suicidal intent or plan   protective : support of family  chronic: prior psych hosp, alcohol binges, self mutilating behavior
moderate risk  Acute: depression, suicidal ideation  mitigating : denies any suicidal intent or plan   protective : support of family  chronic: prior psych hosp, alcohol binges, self mutilating behavior
low risk  Acute: pt denies depression, denies suicidal ideation  mitigating : denies any suicidal intent or plan , pt verbalized willing to attend aftercare treatment   protective : support of family  chronic: prior psych hosp, alcohol binges, self mutilating behavior

## 2021-03-08 NOTE — PROGRESS NOTE BEHAVIORAL HEALTH - PROBLEM SELECTOR PLAN 1
lithium started  zoloft
lithium increased to 900mg  repeat level in one week   zoloft continued
lithium started  zoloft
lithium increased to 900mg   zoloft continued

## 2021-03-08 NOTE — PROGRESS NOTE BEHAVIORAL HEALTH - PRIMARY DX
Depression
Moderate episode of recurrent major depressive disorder
Moderate episode of recurrent major depressive disorder
Depression
Moderate episode of recurrent major depressive disorder

## 2021-03-08 NOTE — PROGRESS NOTE BEHAVIORAL HEALTH - NSBHPTASSESSDT_PSY_A_CORE
05-Mar-2021 12:15
04-Mar-2021 12:08
01-Mar-2021 11:50
02-Mar-2021 14:47
05-Mar-2021 12:15
08-Mar-2021 18:32
03-Mar-2021 12:19

## 2021-03-08 NOTE — PROGRESS NOTE BEHAVIORAL HEALTH - NSBHFUPINTERVALCCFT_PSY_A_CORE
"when can I go home"
"I need to go home by next week"
"When am I going home."
"I'm not as anxious."
"I need to go home by next week"
"I'm going home about time"

## 2021-03-08 NOTE — PROGRESS NOTE BEHAVIORAL HEALTH - SECONDARY DX1
Alcohol abuse
Borderline personality disorder
Borderline personality disorder
Alcohol abuse

## 2021-03-08 NOTE — PROGRESS NOTE BEHAVIORAL HEALTH - NSBHCHARTREVIEWVS_PSY_A_CORE FT
Vital Signs Last 24 Hrs  T(C): 36.5 (02 Mar 2021 08:37), Max: 36.5 (02 Mar 2021 08:37)  T(F): 97.7 (02 Mar 2021 08:37), Max: 97.7 (02 Mar 2021 08:37)  HR: 78 (02 Mar 2021 06:00) (78 - 88)  BP: 93/61 (02 Mar 2021 06:00) (93/61 - 110/78)  BP(mean): --  RR: 18 (02 Mar 2021 08:37) (18 - 18)  SpO2: 99% (02 Mar 2021 08:37) (99% - 100%)
Vital Signs Last 24 Hrs  T(C): 36.8 (08 Mar 2021 07:54), Max: 36.8 (08 Mar 2021 07:54)  T(F): 98.2 (08 Mar 2021 07:54), Max: 98.2 (08 Mar 2021 07:54)  HR: --  BP: --  BP(mean): --  RR: 14 (08 Mar 2021 07:54) (14 - 14)  SpO2: 100% (08 Mar 2021 07:54) (100% - 100%)
Vital Signs Last 24 Hrs  T(C): 36.5 (02 Mar 2021 08:37), Max: 36.5 (02 Mar 2021 08:37)  T(F): 97.7 (02 Mar 2021 08:37), Max: 97.7 (02 Mar 2021 08:37)  HR: 78 (02 Mar 2021 06:00) (78 - 88)  BP: 93/61 (02 Mar 2021 06:00) (93/61 - 110/78)  BP(mean): --  RR: 18 (02 Mar 2021 08:37) (18 - 18)  SpO2: 99% (02 Mar 2021 08:37) (99% - 100%)
Vital Signs Last 24 Hrs  T(C): --  T(F): --  HR: --  BP: --  BP(mean): --  RR: --  SpO2: --
Vital Signs Last 24 Hrs  T(C): 36.2 (01 Mar 2021 06:10), Max: 36.2 (01 Mar 2021 06:10)  T(F): 97.2 (01 Mar 2021 06:10), Max: 97.2 (01 Mar 2021 06:10)  HR: 78 (01 Mar 2021 06:10) (78 - 78)  BP: 102/76 (01 Mar 2021 06:10) (102/76 - 102/76)  BP(mean): --  RR: 14 (01 Mar 2021 06:10) (14 - 14)  SpO2: 100% (01 Mar 2021 06:10) (100% - 100%)
Vital Signs Last 24 Hrs  T(C): 36.6 (07 Mar 2021 08:16), Max: 36.6 (07 Mar 2021 08:16)  T(F): 97.8 (07 Mar 2021 08:16), Max: 97.8 (07 Mar 2021 08:16)  HR: --  BP: --  BP(mean): --  RR: 18 (07 Mar 2021 08:16) (18 - 18)  SpO2: 100% (07 Mar 2021 08:16) (100% - 100%)
Vital Signs Last 24 Hrs  T(C): 36.7 (04 Mar 2021 08:20), Max: 36.7 (04 Mar 2021 08:20)  T(F): 98.1 (04 Mar 2021 08:20), Max: 98.1 (04 Mar 2021 08:20)  HR: --  BP: --  BP(mean): --  RR: 14 (04 Mar 2021 08:20) (14 - 14)  SpO2: 100% (04 Mar 2021 08:20) (100% - 100%)

## 2021-03-08 NOTE — PROGRESS NOTE BEHAVIORAL HEALTH - SUMMARY
21yo M, domiciled with mother, unemployed, non-caregiver, PPH of depression, borderline personality disorder, alcohol abuse, at least 2 prior psych hospitalizations, in 2019, and most recently in 2/2/2020 at [facility] for cutting in the setting of alcohol intoxication, currently in outpatient txt with psychiatrist Dr. Beasley, hx NSSIB and SA via asphyxiation w/plastic bag and cutting, no hx of aggressive or violent behavior, currently binge drinking alcohol, no history of detox/rehab or withdrawal symptoms, no legal issues who was BIBEMS again for cutting in the context of alcohol use. Patient has significant wounds from prior self cutting on bilateral arms and right leg.    Current presentation is similar to when patient was last admitted to [facility] in 2020: presents depressed, with increased drinking, increased cutting, and concerns for SI/SA that patient won't discuss. Moreover, appears to be high risk considering psych history. At this time, currently meets criteria for involuntary admission and could benefit from hospitalization for safety and stabilization via titration of medication.      Plan: admitted via telepsych  NO need for CO.  restart home meds  Completed labs Hba1C, lipid panel, TSH  Obtain PE (hospitalist consult).   Collateral info per primary team.  denies smoking,: last drink "last week Friday" - denies withdrawal and will put him in CIWA
19yo M, domiciled with mother, unemployed, non-caregiver, PPH of depression, borderline personality disorder, alcohol abuse, at least 2 prior psych hospitalizations, in 2019, and most recently in 2/2/2020 at [facility] for cutting in the setting of alcohol intoxication, currently in outpatient txt with psychiatrist Dr. Beasley, hx NSSIB and SA via asphyxiation w/plastic bag and cutting, no hx of aggressive or violent behavior, currently binge drinking alcohol, no history of detox/rehab or withdrawal symptoms, no legal issues who was BIBEMS again for cutting in the context of alcohol use. Patient has significant wounds from prior self cutting on bilateral arms and right leg.    Current presentation is similar to when patient was last admitted to [facility] in 2020: presents depressed, with increased drinking, increased cutting, and concerns for SI/SA that patient won't discuss. Moreover, appears to be high risk considering psych history. At this time, currently meets criteria for involuntary admission and could benefit from hospitalization for safety and stabilization via titration of medication.      Plan: admitted via telepsych  NO need for CO.  restart home meds  Completed labs Hba1C, lipid panel, TSH  Obtain PE (hospitalist consult).   Collateral info per primary team.  denies smoking,: last drink "last week Friday" - denies withdrawal and will put him in CIWA
21yo M, domiciled with mother, unemployed, non-caregiver, PPH of depression, borderline personality disorder, alcohol abuse, at least 2 prior psych hospitalizations, in 2019, and most recently in 2/2/2020 at [facility] for cutting in the setting of alcohol intoxication, currently in outpatient txt with psychiatrist Dr. Beasley, hx NSSIB and SA via asphyxiation w/plastic bag and cutting, no hx of aggressive or violent behavior, currently binge drinking alcohol, no history of detox/rehab or withdrawal symptoms, no legal issues who was BIBEMS again for cutting in the context of alcohol use. Patient has significant wounds from prior self cutting on bilateral arms and right leg.    Current presentation is similar to when patient was last admitted to [facility] in 2020: presents depressed, with increased drinking, increased cutting, and concerns for SI/SA that patient won't discuss. Moreover, appears to be high risk considering psych history. At this time, currently meets criteria for involuntary admission and could benefit from hospitalization for safety and stabilization via titration of medication.      Plan: admitted via telepsych  NO need for CO.  restart home meds  Completed labs Hba1C, lipid panel, TSH  Obtain PE (hospitalist consult).   Collateral info per primary team.  denies smoking,: last drink "last week Friday" - denies withdrawal and will put him in CIWA
19yo M, domiciled with mother, unemployed, non-caregiver, PPH of depression, borderline personality disorder, alcohol abuse, at least 2 prior psych hospitalizations, in 2019, and most recently in 2/2/2020 at [facility] for cutting in the setting of alcohol intoxication, currently in outpatient txt with psychiatrist Dr. Beasley, hx NSSIB and SA via asphyxiation w/plastic bag and cutting, no hx of aggressive or violent behavior, currently binge drinking alcohol, no history of detox/rehab or withdrawal symptoms, no legal issues who was BIBEMS again for cutting in the context of alcohol use. Patient has significant wounds from prior self cutting on bilateral arms and right leg.    Current presentation is similar to when patient was last admitted to [facility] in 2020: presents depressed, with increased drinking, increased cutting, and concerns for SI/SA that patient won't discuss. Moreover, appears to be high risk considering psych history. At this time, currently meets criteria for involuntary admission and could benefit from hospitalization for safety and stabilization via titration of medication.      Plan: admitted via telepsych  NO need for CO.  restart home meds  Completed labs Hba1C, lipid panel, TSH  Obtain PE (hospitalist consult).   Collateral info per primary team.  denies smoking,: last drink "last week Friday" - denies withdrawal and will put him in CIWA
19yo M, domiciled with mother, unemployed, non-caregiver, PPH of depression, borderline personality disorder, alcohol abuse, at least 2 prior psych hospitalizations, in 2019, and most recently in 2/2/2020 at [facility] for cutting in the setting of alcohol intoxication, currently in outpatient txt with psychiatrist Dr. Beasley, hx NSSIB and SA via asphyxiation w/plastic bag and cutting, no hx of aggressive or violent behavior, currently binge drinking alcohol, no history of detox/rehab or withdrawal symptoms, no legal issues who was BIBEMS again for cutting in the context of alcohol use. Patient has significant wounds from prior self cutting on bilateral arms and right leg.    Current presentation is similar to when patient was last admitted to [facility] in 2020: presents depressed, with increased drinking, increased cutting, and concerns for SI/SA that patient won't discuss. Moreover, appears to be high risk considering psych history. At this time, currently meets criteria for involuntary admission and could benefit from hospitalization for safety and stabilization via titration of medication.      Plan: admitted via telepsych  NO need for CO.  restart home meds  Completed labs Hba1C, lipid panel, TSH  Obtain PE (hospitalist consult).   Collateral info per primary team.  denies smoking,: last drink "last week Friday" - denies withdrawal and will put him in CIWA
21yo M, domiciled with mother, unemployed, non-caregiver, PPH of depression, borderline personality disorder, alcohol abuse, at least 2 prior psych hospitalizations, in 2019, and most recently in 2/2/2020 at [facility] for cutting in the setting of alcohol intoxication, currently in outpatient txt with psychiatrist Dr. Beasley, hx NSSIB and SA via asphyxiation w/plastic bag and cutting, no hx of aggressive or violent behavior, currently binge drinking alcohol, no history of detox/rehab or withdrawal symptoms, no legal issues who was BIBEMS again for cutting in the context of alcohol use. Patient has significant wounds from prior self cutting on bilateral arms and right leg.    Current presentation is similar to when patient was last admitted to [facility] in 2020: presents depressed, with increased drinking, increased cutting, and concerns for SI/SA that patient won't discuss. Moreover, appears to be high risk considering psych history. At this time, currently meets criteria for involuntary admission and could benefit from hospitalization for safety and stabilization via titration of medication.      Plan: admitted via telepsych  NO need for CO.  restart home meds  Completed labs Hba1C, lipid panel, TSH  Obtain PE (hospitalist consult).   Collateral info per primary team.  denies smoking,: last drink "last week Friday" - denies withdrawal and will put him in CIWA
19yo M, domiciled with mother, unemployed, non-caregiver, PPH of depression, borderline personality disorder, alcohol abuse, at least 2 prior psych hospitalizations, in 2019, and most recently in 2/2/2020 at [facility] for cutting in the setting of alcohol intoxication, currently in outpatient txt with psychiatrist Dr. Beasley, hx NSSIB and SA via asphyxiation w/plastic bag and cutting, no hx of aggressive or violent behavior, currently binge drinking alcohol, no history of detox/rehab or withdrawal symptoms, no legal issues who was BIBEMS again for cutting in the context of alcohol use. Patient has significant wounds from prior self cutting on bilateral arms and right leg.    Current presentation is similar to when patient was last admitted to [facility] in 2020: presents depressed, with increased drinking, increased cutting, and concerns for SI/SA that patient won't discuss. Moreover, appears to be high risk considering psych history. At this time, currently meets criteria for involuntary admission and could benefit from hospitalization for safety and stabilization via titration of medication.      Plan: admitted via telepsych  NO need for CO.  restart home meds  Completed labs Hba1C, lipid panel, TSH  Obtain PE (hospitalist consult).   Collateral info per primary team.  denies smoking,: last drink "last week Friday" - denies withdrawal and will put him in CIWA

## 2021-03-08 NOTE — PROGRESS NOTE BEHAVIORAL HEALTH - NSBHCHARTREVIEWINVESTIGATE_PSY_A_CORE FT
Ventricular Rate 89 BPM    Atrial Rate 89 BPM    P-R Interval 166 ms    QRS Duration 84 ms    Q-T Interval 338 ms    QTC Calculation(Bazett) 411 ms    P Axis 71 degrees    R Axis 84 degrees    T Axis 72 degrees    Diagnosis Line Normal sinus rhythm  Early repolarization  Normal ECG  No previous ECGs available
QRS axis to [] ° and NSR at a rate of [] BPM. There was no atrial enlargement. There was no ventricular hypertrophy. There were no ST-T changes and all intervals were normal.
Ventricular Rate 89 BPM    Atrial Rate 89 BPM    P-R Interval 166 ms    QRS Duration 84 ms    Q-T Interval 338 ms    QTC Calculation(Bazett) 411 ms    P Axis 71 degrees    R Axis 84 degrees    T Axis 72 degrees    Diagnosis Line Normal sinus rhythm  Early repolarization  Normal ECG  No previous ECGs available
QRS axis to [] ° and NSR at a rate of [] BPM. There was no atrial enlargement. There was no ventricular hypertrophy. There were no ST-T changes and all intervals were normal.
Ventricular Rate 89 BPM    Atrial Rate 89 BPM    P-R Interval 166 ms    QRS Duration 84 ms    Q-T Interval 338 ms    QTC Calculation(Bazett) 411 ms    P Axis 71 degrees    R Axis 84 degrees    T Axis 72 degrees    Diagnosis Line Normal sinus rhythm  Early repolarization  Normal ECG  No previous ECGs available

## 2021-03-08 NOTE — PROGRESS NOTE BEHAVIORAL HEALTH - NSBHFUPVIOL_PSY_A_CORE
unable to assess
none known
unable to assess
unable to assess
none known
unable to assess
unable to assess

## 2021-03-08 NOTE — PROGRESS NOTE BEHAVIORAL HEALTH - ADDITIONAL DETAILS / COMMENTS
due to increased drinking and cutting

## 2021-03-08 NOTE — PROGRESS NOTE BEHAVIORAL HEALTH - NSBHADMITMEDEDUDETAILS_A_CORE FT
side effecters/ benefits and answered the questions

## 2021-03-08 NOTE — PROGRESS NOTE BEHAVIORAL HEALTH - NSBHADMITIPOBSFT_PSY_A_CORE
no imminent risk, no intent to harm self on the unit

## 2021-03-08 NOTE — PROGRESS NOTE BEHAVIORAL HEALTH - PROBLEM SELECTOR PLAN 2
encourage the pt to attend groups for insight

## 2021-03-08 NOTE — PROGRESS NOTE BEHAVIORAL HEALTH - NSBHFUPINTERVALHXFT_PSY_A_CORE
Pt continuing to deny any suicidal ideation intent or plan . He also continued with denyinmg that he has a drinking problem .  Pt continuing to mimimize issues and claiming that he is only wanting to return to his usual follow up providers. However CSW was nnotified by his providers that they no longer wanted to continue with his treatment .  Pt will be needing aftercare appt in place.  Also pt is needing the lithium level on sunday.
Pt willing to attend groups , still not considering the need for rehab treatment for  the alcohol use. Started the lithium in hope of decreasing mood and affect lability and depressive episodes and needing to have suicidal ideation . Pt still wanting the mother to be involved in his treatment but also is concerned that any information that he spoke of his mother not be made known to her. .  .
Pt denies any suicidal ideation intent or plan Pt with denial f any side effects from medication .  Pt with euthymic mood and affect is full and mood congruent .   Pt is aware of the need for repeat of the lithium level in a week after dicharge.  Pt with lithium as augment for the antidepressant.  Pt verbalized willing to continue with psychotherapy
Pt with lithium level of 0.4 at dose of 600mg of lithium , Lithium augmentation usually at 900mg with level in 0.5-0.8   Will increase the lithium dose and pt will need to have repeat of the lithium level one week after discharge.  Pt denies any side effect from medication. Pt denies any suicidal ideation intent or plan .
Spoke with the outpt provider and reviewed pt's progress and medication s.  Pt remaining chronically as high risk for self injury and suicidal ideation and attempts.  Pt wanting to continue with same providers and was believing that the treatment was helpful.  Pt at this time is denying any suicidal ideation intent or plan.  Pt with chronic hx of not giving any indication of having suicidal ideation plans. t
Pt with denial of any suicidal ideation intent or plan. Pt with constricted blunted affect . Pt claiming he is still preoccupied with  past  family issues .  Pt claiming that he is not comfortable at this time to talk about the issues.  Spoke with the pt about medication and of potential side effects and to consider augment of
Pt was at best very superficially involved in his treatment.  Pt claiming that he is "not depressed or anxious" and started to yawn and appeared indifferent, bored. Pt claimed the "medication work, well sort of but I stopped them"

## 2021-03-15 DIAGNOSIS — R74.01 ELEVATION OF LEVELS OF LIVER TRANSAMINASE LEVELS: ICD-10-CM

## 2021-03-15 DIAGNOSIS — R45.851 SUICIDAL IDEATIONS: ICD-10-CM

## 2021-03-15 DIAGNOSIS — F60.3 BORDERLINE PERSONALITY DISORDER: ICD-10-CM

## 2021-03-15 DIAGNOSIS — F10.129 ALCOHOL ABUSE WITH INTOXICATION, UNSPECIFIED: ICD-10-CM

## 2021-03-15 DIAGNOSIS — F33.1 MAJOR DEPRESSIVE DISORDER, RECURRENT, MODERATE: ICD-10-CM
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
## 2021-04-17 ENCOUNTER — EMERGENCY (EMERGENCY)
Facility: HOSPITAL | Age: 21
LOS: 1 days | Discharge: HOME | End: 2021-04-17
Attending: EMERGENCY MEDICINE | Admitting: EMERGENCY MEDICINE
Payer: COMMERCIAL

## 2021-04-17 VITALS
TEMPERATURE: 99 F | DIASTOLIC BLOOD PRESSURE: 108 MMHG | RESPIRATION RATE: 18 BRPM | HEIGHT: 72 IN | OXYGEN SATURATION: 100 % | HEART RATE: 112 BPM | SYSTOLIC BLOOD PRESSURE: 141 MMHG

## 2021-04-17 VITALS
HEART RATE: 100 BPM | RESPIRATION RATE: 16 BRPM | TEMPERATURE: 98 F | OXYGEN SATURATION: 100 % | DIASTOLIC BLOOD PRESSURE: 67 MMHG | SYSTOLIC BLOOD PRESSURE: 132 MMHG

## 2021-04-17 DIAGNOSIS — F10.10 ALCOHOL ABUSE, UNCOMPLICATED: ICD-10-CM

## 2021-04-17 DIAGNOSIS — F43.24 ADJUSTMENT DISORDER WITH DISTURBANCE OF CONDUCT: ICD-10-CM

## 2021-04-17 DIAGNOSIS — F60.3 BORDERLINE PERSONALITY DISORDER: ICD-10-CM

## 2021-04-17 LAB
ALBUMIN SERPL ELPH-MCNC: 5.1 G/DL — HIGH (ref 3.3–5)
ALP SERPL-CCNC: 63 U/L — SIGNIFICANT CHANGE UP (ref 40–120)
ALT FLD-CCNC: 121 U/L — HIGH (ref 4–41)
AMPHET UR-MCNC: NEGATIVE — SIGNIFICANT CHANGE UP
ANION GAP SERPL CALC-SCNC: 15 MMOL/L — HIGH (ref 7–14)
APAP SERPL-MCNC: <15 UG/ML — SIGNIFICANT CHANGE UP (ref 15–25)
APPEARANCE UR: CLEAR — SIGNIFICANT CHANGE UP
AST SERPL-CCNC: 116 U/L — HIGH (ref 4–40)
BARBITURATES UR SCN-MCNC: NEGATIVE — SIGNIFICANT CHANGE UP
BASOPHILS # BLD AUTO: 0.03 K/UL — SIGNIFICANT CHANGE UP (ref 0–0.2)
BASOPHILS NFR BLD AUTO: 0.4 % — SIGNIFICANT CHANGE UP (ref 0–2)
BENZODIAZ UR-MCNC: NEGATIVE — SIGNIFICANT CHANGE UP
BILIRUB SERPL-MCNC: 0.4 MG/DL — SIGNIFICANT CHANGE UP (ref 0.2–1.2)
BILIRUB UR-MCNC: NEGATIVE — SIGNIFICANT CHANGE UP
BUN SERPL-MCNC: 11 MG/DL — SIGNIFICANT CHANGE UP (ref 7–23)
CALCIUM SERPL-MCNC: 9.4 MG/DL — SIGNIFICANT CHANGE UP (ref 8.4–10.5)
CHLORIDE SERPL-SCNC: 103 MMOL/L — SIGNIFICANT CHANGE UP (ref 98–107)
CO2 SERPL-SCNC: 24 MMOL/L — SIGNIFICANT CHANGE UP (ref 22–31)
COCAINE METAB.OTHER UR-MCNC: NEGATIVE — SIGNIFICANT CHANGE UP
COLOR SPEC: COLORLESS — SIGNIFICANT CHANGE UP
CREAT SERPL-MCNC: 1.08 MG/DL — SIGNIFICANT CHANGE UP (ref 0.5–1.3)
CREATININE URINE RESULT, DAU: 64 MG/DL — SIGNIFICANT CHANGE UP
DIFF PNL FLD: NEGATIVE — SIGNIFICANT CHANGE UP
EOSINOPHIL # BLD AUTO: 0.06 K/UL — SIGNIFICANT CHANGE UP (ref 0–0.5)
EOSINOPHIL NFR BLD AUTO: 0.9 % — SIGNIFICANT CHANGE UP (ref 0–6)
ETHANOL SERPL-MCNC: 304 MG/DL — HIGH
GLUCOSE SERPL-MCNC: 105 MG/DL — HIGH (ref 70–99)
GLUCOSE UR QL: NEGATIVE — SIGNIFICANT CHANGE UP
HCT VFR BLD CALC: 46.9 % — SIGNIFICANT CHANGE UP (ref 39–50)
HGB BLD-MCNC: 15.6 G/DL — SIGNIFICANT CHANGE UP (ref 13–17)
IANC: 3.07 K/UL — SIGNIFICANT CHANGE UP (ref 1.5–8.5)
IMM GRANULOCYTES NFR BLD AUTO: 0.1 % — SIGNIFICANT CHANGE UP (ref 0–1.5)
KETONES UR-MCNC: NEGATIVE — SIGNIFICANT CHANGE UP
LEUKOCYTE ESTERASE UR-ACNC: NEGATIVE — SIGNIFICANT CHANGE UP
LYMPHOCYTES # BLD AUTO: 2.94 K/UL — SIGNIFICANT CHANGE UP (ref 1–3.3)
LYMPHOCYTES # BLD AUTO: 44.1 % — HIGH (ref 13–44)
MCHC RBC-ENTMCNC: 29.7 PG — SIGNIFICANT CHANGE UP (ref 27–34)
MCHC RBC-ENTMCNC: 33.3 GM/DL — SIGNIFICANT CHANGE UP (ref 32–36)
MCV RBC AUTO: 89.2 FL — SIGNIFICANT CHANGE UP (ref 80–100)
METHADONE UR-MCNC: NEGATIVE — SIGNIFICANT CHANGE UP
MONOCYTES # BLD AUTO: 0.56 K/UL — SIGNIFICANT CHANGE UP (ref 0–0.9)
MONOCYTES NFR BLD AUTO: 8.4 % — SIGNIFICANT CHANGE UP (ref 2–14)
NEUTROPHILS # BLD AUTO: 3.07 K/UL — SIGNIFICANT CHANGE UP (ref 1.8–7.4)
NEUTROPHILS NFR BLD AUTO: 46.1 % — SIGNIFICANT CHANGE UP (ref 43–77)
NITRITE UR-MCNC: NEGATIVE — SIGNIFICANT CHANGE UP
NRBC # BLD: 0 /100 WBCS — SIGNIFICANT CHANGE UP
NRBC # FLD: 0 K/UL — SIGNIFICANT CHANGE UP
OPIATES UR-MCNC: NEGATIVE — SIGNIFICANT CHANGE UP
OXYCODONE UR-MCNC: NEGATIVE — SIGNIFICANT CHANGE UP
PCP SPEC-MCNC: SIGNIFICANT CHANGE UP
PCP UR-MCNC: NEGATIVE — SIGNIFICANT CHANGE UP
PH UR: 6 — SIGNIFICANT CHANGE UP (ref 5–8)
PLATELET # BLD AUTO: 242 K/UL — SIGNIFICANT CHANGE UP (ref 150–400)
POTASSIUM SERPL-MCNC: 3.8 MMOL/L — SIGNIFICANT CHANGE UP (ref 3.5–5.3)
POTASSIUM SERPL-SCNC: 3.8 MMOL/L — SIGNIFICANT CHANGE UP (ref 3.5–5.3)
PROT SERPL-MCNC: 8 G/DL — SIGNIFICANT CHANGE UP (ref 6–8.3)
PROT UR-MCNC: NEGATIVE — SIGNIFICANT CHANGE UP
RBC # BLD: 5.26 M/UL — SIGNIFICANT CHANGE UP (ref 4.2–5.8)
RBC # FLD: 13.7 % — SIGNIFICANT CHANGE UP (ref 10.3–14.5)
SALICYLATES SERPL-MCNC: <5 MG/DL — LOW (ref 15–30)
SARS-COV-2 RNA SPEC QL NAA+PROBE: SIGNIFICANT CHANGE UP
SODIUM SERPL-SCNC: 142 MMOL/L — SIGNIFICANT CHANGE UP (ref 135–145)
SP GR SPEC: 1.01 — LOW (ref 1.01–1.02)
THC UR QL: NEGATIVE — SIGNIFICANT CHANGE UP
TOXICOLOGY SCREEN, DRUGS OF ABUSE, SERUM RESULT: SIGNIFICANT CHANGE UP
TSH SERPL-MCNC: 1.26 UIU/ML — SIGNIFICANT CHANGE UP (ref 0.27–4.2)
UROBILINOGEN FLD QL: SIGNIFICANT CHANGE UP
WBC # BLD: 6.67 K/UL — SIGNIFICANT CHANGE UP (ref 3.8–10.5)
WBC # FLD AUTO: 6.67 K/UL — SIGNIFICANT CHANGE UP (ref 3.8–10.5)

## 2021-04-17 PROCEDURE — 90792 PSYCH DIAG EVAL W/MED SRVCS: CPT

## 2021-04-17 PROCEDURE — 99285 EMERGENCY DEPT VISIT HI MDM: CPT

## 2021-04-17 NOTE — ED ADULT NURSE REASSESSMENT NOTE - NS ED NURSE REASSESS COMMENT FT1
pt lying on bed in nad eyes close breathing even & unlabored eval on going. pt lying on bed in nad eyes close breathing even & unlabored eval on going.  pt cleared by psych d/c by provider pt  verbalizing understanding of d/c instructions.

## 2021-04-17 NOTE — ED ADULT NURSE NOTE - CHIEF COMPLAINT QUOTE
Pt brought in by mom who states pt was volatile, yelling and cursing last night. Pt has been cutting himself on neck and top of hand. Hx: Borderline personality d/o, anxiety. Mom says pt stopped taking med x a few weeks. Pt denies this. Pt currently denies SI/HI/auditory or visual hallucinations. Superficial healing lacs observed to L hand and left neck.     Dr. KACIE Fang called for  flaca Vazquez ([facility]) [phone]

## 2021-04-17 NOTE — ED BEHAVIORAL HEALTH ASSESSMENT NOTE - CURRENT MEDICATION
Zoloft 50mg daily Zoloft 200 mg po daily, Naltrexone 50 mg po daily, Trazodone 50 mg po QHS prn insomnia

## 2021-04-17 NOTE — ED ADULT NURSE NOTE - OBJECTIVE STATEMENT
Received pt in  pt c/o verbal altercation with mom pt denies si/hi/avh presently, noted with self inflicted abrasions on left hand & neck. Emotional reassurance provided eval on going.

## 2021-04-17 NOTE — ED BEHAVIORAL HEALTH ASSESSMENT NOTE - SAFETY PLAN ADDT'L DETAILS
Safety plan discussed with.../Education provided regarding environmental safety / lethal means restriction/Provision of National Suicide Prevention Lifeline 3-334-610-FHKD (5123)

## 2021-04-17 NOTE — ED PROVIDER NOTE - OBJECTIVE STATEMENT
21 y/o M wth PMHx of borderline personality disorder, anxiety, depression BIB by mom after verbal altercation last night. Mom concerned decomposition. Pt is not taking his pills because she counted them this morning. Mom states he is drinking alcohol and no showering for days. Concerned he will harm himself again. Pt denies noncompliant with medications. States he had one pint of vodka last night. Admits verbal altercation with mom. Denies HI or SI. No hallucinations or delusion. One suicide attempt in the past and one psych admission for SI. Last admission one month ago. Pt does not believe he needs admission.
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
## 2021-04-17 NOTE — ED PROVIDER NOTE - PATIENT PORTAL LINK FT
You can access the FollowMyHealth Patient Portal offered by [facility] by registering at the following website: [email or web address]. By joining [facility]’s FollowMyHealth portal, you will also be able to view your health information using other applications (apps) compatible with our system.

## 2021-04-17 NOTE — ED BEHAVIORAL HEALTH ASSESSMENT NOTE - HPI (INCLUDE ILLNESS QUALITY, SEVERITY, DURATION, TIMING, CONTEXT, MODIFYING FACTORS, ASSOCIATED SIGNS AND SYMPTOMS)
21yo M, domiciled with mother, unemployed, non-caregiver, PPH of depression, borderline personality disorder, alcohol abuse, at least 3 prior psych hospitalizations (most recently end of Feb to 3/8/21 at [facility]), currently followed at [facility] by psychiatrist Dr. Hackett and therapist Dr. Beasley, hx NSSIB and SA via asphyxiation w/plastic bag and cutting, no hx of aggressive or violent behavior, no history of detox/rehab or withdrawal symptoms, no legal issues, brought in by mother for superficially cutting neck and heavy drinking.     Though pt's BAL is elevated, he is linear and coherent on interview. He admits to superficially cutting his neck (small laceration noted on left side of neck) in the context of alcohol intoxication and argument with his mother. He reports chronic conflict with his mother. He adamantly denies cutting his neck as a suicide attempt. States he did it to release anger. He adamantly denies active suicidal ideation, intent, or plan. He denies passive SI. He reports chronically depressed mood.     Per CVM, pt most recently saw psychiatrist Dr. Hackett on unit/LOCKER9 19yo M, domiciled with mother, unemployed, non-caregiver, PPH of depression, borderline personality disorder, alcohol abuse, at least 3 prior psych hospitalizations (most recently end of Feb to 3/8/21 at [facility]), currently followed at [facility] by psychiatrist Dr. Hackett and therapist Dr. Beasley, hx NSSIB and SA via asphyxiation w/plastic bag and cutting, no hx of aggressive or violent behavior, no history of detox/rehab or withdrawal symptoms, no legal issues, brought in by mother for superficially cutting neck and heavy drinking.     See  note for collateral from mother.     VTA=583 at 10:00 AM. Though pt's BAL is elevated, he is linear and coherent on interview. He admits to superficially cutting his neck (small laceration noted on left side of neck) in the context of alcohol intoxication (states he drank 1 pint of vodka overnight) and argument with his mother. He reports chronic conflict with his mother. He adamantly denies cutting his neck as a suicide attempt. States he did it to release anger. He adamantly denies active suicidal ideation, intent, or plan. States he most recently experienced active SI prior to last admission in Feb 2021. He denies current passive SI. He reports chronically depressed mood. He denies changes in sleep, appetite, energy level. He denies anhedonia, denies hopelessness. He denies manic or psychotic symptoms. He denies homicidal or violent ideation, intent, or plan. Prior to last night/today, pt states he last drank alcohol about 1 month ago. He denies drug use. He reports medication compliance.     Per CV, pt most recently saw psychiatrist Dr. Hackett on 4/5/21 at which time he was at baseline. Dr. Hackett documented: "He reports feeling persistently depressed since middle school in the context of stressful family dynamics. Over the course of the patient's sessions he has reported unstable sense of self, impulsivity (EtOH use, self-injury), recurrent NSSIB, affective   instability, chronic feelings of emptiness and numbness, and transient paranoid ideation. He has also endorsed feelings of guilt, amotivation, emptiness and intermittent passive SI throughout the treatment thus far."

## 2021-04-17 NOTE — ED PROVIDER NOTE - PROGRESS NOTE DETAILS
Kevin MADISON . Vitals stable, clinically sober.  Steady gait. Tolerated PO fluids well. D/C home in company of mother. List of resources provided. Verbalized understanding.

## 2021-04-17 NOTE — ED PROVIDER NOTE - PHYSICAL EXAMINATION
calm, cooperative, speech clear, pt answers questions and does not always answer   multiple liner superficial laceration to left dorsum of hand, one abrasion to left side of neck, pt will not tell how he got them, mom believes they are self inflicted

## 2021-04-17 NOTE — ED BEHAVIORAL HEALTH ASSESSMENT NOTE - DIFFERENTIAL
Major Depressive Disorder  Borderline Personality Disorder  Alcohol Use Disorder Borderline Personality Disorder  Alcohol Use Disorder  substance-induced mood disorder   mdd

## 2021-04-17 NOTE — ED BEHAVIORAL HEALTH ASSESSMENT NOTE - SUMMARY
21yo M, domiciled with mother, unemployed, non-caregiver, PPH of depression, borderline personality disorder, alcohol abuse, at least 3 prior psych hospitalizations (most recently end of Feb to 3/8/21 at [facility]), currently followed at [facility] by psychiatrist Dr. Hackett and therapist Dr. Beasley, hx NSSIB and SA via asphyxiation w/plastic bag and cutting, no hx of aggressive or violent behavior, no history of detox/rehab or withdrawal symptoms, no legal issues, brought in by mother for superficially cutting neck and heavy drinking.   Though pt's BAL is elevated, he is linear and coherent on interview. He admits to superficially cutting his neck (small laceration noted on left side of neck) in the context of alcohol intoxication and argument with his mother. He reports chronic conflict with his mother. He adamantly denies cutting his neck as a suicide attempt. States he did it to release anger. He adamantly denies active suicidal ideation, intent, or plan. He denies passive SI. He reports chronically depressed mood, no acute decompensation from baseline. He is not manic or psychotic. He is calm and in good behavioral control. Pt does not present an acute danger to self or others and does not meet criteria for involuntary psychiatric admission at this time. Pt declines voluntary psychiatric admission at this time.

## 2021-04-17 NOTE — ED ADULT TRIAGE NOTE - CHIEF COMPLAINT QUOTE
Pt brought in by mom who states pt was volatile, yelling and cursing last night. Pt has been cutting himself on neck and top of hand. Hx: Borderline personality d/o, anxiety. Mom says pt stopped taking med x a few weeks. Pt denies this. Pt currently denies SI/HI/auditory or visual hallucinations. Superficial healing lacs observed to L hand and left neck.     Dr. KACIE Fang called for  flaca Vazquez ([facility]) [phone]

## 2021-04-17 NOTE — ED BEHAVIORAL HEALTH ASSESSMENT NOTE - RISK ASSESSMENT
High Acute Suicide Risk History of mental illness, suicide attempts, self injury, poor frustration tolerance. Could benefit from safer environment and further titration of medications. Pt is at chronically elevated risk of harm to self given history of suicide attempts, h/o cutting, impulsivity, substance abuse, ongoing conflict with mother, h/o psych admissions.   Protective factors include no violence history, medication compliance, no access to guns, no global insomnia, supportive family, no suicidal ideation or homicidal ideation, identifies reasons for living, future-oriented.   Pt is not at acutely elevated risk of harm to self or others. Low Acute Suicide Risk

## 2021-04-17 NOTE — ED PROVIDER NOTE - NSFOLLOWUPCLINICS_GEN_ALL_ED_FT
[facility]  Behavioral Health  [id number] [place] [address]  Phone: [phone]  Fax:

## 2021-04-17 NOTE — ED BEHAVIORAL HEALTH ASSESSMENT NOTE - NSSUICRSKFACTOR_PSY_ALL_CORE
Current and Past Psychiatric Diagnoses/Presenting Symptoms/Historical Factors Current and Past Psychiatric Diagnoses/Presenting Symptoms/Historical Factors/Treatment Related Factors/Activating Events/Stressors

## 2021-04-17 NOTE — ED BEHAVIORAL HEALTH ASSESSMENT NOTE - EMPLOYMENT
Patient stopped into the [facility] office today  He saw Dr Penelope Brown on Friday with instructions to let left hand incision dry out  At his job he works with chemicals/paint  When he cover this with a glove it starts to ooze  Please call patient at [phone] 
Patient was given a return phone call  Excuse for work was given to remain out for this week  May return on 2/3/2020 without restrictions  He will give the office a call later in the week if he is still noticing wound healing issues  He is going to come to the [facility] office to  this note later today 
Unemployed

## 2021-04-17 NOTE — ED BEHAVIORAL HEALTH ASSESSMENT NOTE - DETAILS
cut last night see above informed mother see HPI call 911 or return to ED if symptoms worsen or if pt develops thoughts of harming self or others

## 2021-04-17 NOTE — ED BEHAVIORAL HEALTH ASSESSMENT NOTE - NSSUICPROTFACT_PSY_ALL_CORE
None known Responsibility to children, family, or others/Identifies reasons for living/Supportive social network of family or friends/Fear of death or the actual act of killing self/Positive therapeutic relationships

## 2021-04-17 NOTE — ED BEHAVIORAL HEALTH ASSESSMENT NOTE - NSBHROSSTATEMENT_PSY_A_CORE
[facility]  Circumcision Procedure Note    Date of Admission: 2020  Date of Service:  2020    Patient Name: Gorge Vargas  :  2022  MRN:  [id number]    Informed consent:  We have discussed the proposed procedure (risks, benefits, complications, medications and alternatives) of the circumcision with the parent(s).    Time out performed: yes    Procedure Details:  Informed consent was obtained. Examination of the external anatomical structures was normal. Analgesia was obtained by using 24% Sucrose solution PO and 1% Lidocaine (0.8cc) administered by using a 27 g needle at 10 and 2 o'clock. Penis and surrounding area prepped w/betadine in sterile fashion, fenestrated drape used. Hemostat clamps applied, adhesions released with hemostats.  Mogan  Clamp was applied.  Foreskin removed above clamp with scalpel.  The Mogan clamp was removed and the skin was retracted to the base of the glans.  Any further adhesions were  from the glans. Hemostasis was assured.      Complications:  None. Tolerated without difficulty.        Procedure performed by: MD Kenya Boswell MD  2020  14:38       .

## 2021-04-17 NOTE — ED BEHAVIORAL HEALTH NOTE - BEHAVIORAL HEALTH NOTE
Worker called patient’s mother Mitzi Noble ([phone]) for collateral information. All information is as per Ms. Noble:    Patient is a 20 year old male, domiciled with mother, step parent, and  (3) brothers, unemployed, not a college student, with a dx of borderline personality and MDD, with a recent psychiatric hospitalization at 6 weeks ago a [facility], BIB as a walk in for suicidal ideation.  Mother states that the patient has been non- compliant with medications for over a month. Mother states that the patient has issues with alcohol and drinks daily. Mother was unsure of the amount of alcohol but thinks it is about a bottle a day. Mother states that the patient sees a psychiatrist at [facility] with Dr. Posada. Mother states that the pt missed his virtual appointment with the doctor on Friday because he was intoxicated. Mother states that the patient has never followed up with rehab or detox. Mother states that the patient has a SA 2 years ago of slitting his wrists and placing a plastic bag over his head and duck tapping it. Mother states that at that time he drank 2 gallons of alcohol. Mother reports that the patient engages in SIB of cutting and has scars all over his arms. She reports that last night she had an intervention with the patient regarding his drinking. She states at that time the patient became verbally aggressive and was yelling in her face. Mother states that the patient was then speaking to his friend who lives in [place] and reported to the friend he was suicidal and wanted to cut his throat. Mother states that the patient has superficial cuts to his throat and arms. Mother states that the patient has been drinking alcohol of yesterday. Mother reports that she found marijuana, a razor, and alcohol in the patient’s room. She is unsure if the patient is presenting with . Mother denies that the patient has access to a gun or has legal issues. Mother denies that the patient is having VH. Mother states that the patient has been up all night and eating sporadically. She states that the patient has been resistant to getting help for his substance abuse issues. Mother denies that the patient told her that he was suicidal yesterday. Mother states that his friend contacted his brother and this is how she became aware of his suicidal thoughts. Mother states that he has issues with his liver enzymes. Case discussed with Dr. Hall. Patient is cleared psychiatrically for discharge. Worker informed pt’s mother of patient’s psychiatric clearance.  Worker also contacted sbirt on call and spoke to veronique who states that once pt is cleared he can talk to patient via phone. Mother states that patient can Uber home when discharge. Worker called patient’s mother Mitzi Noble ([phone]) for collateral information. All information is as per Ms. Noble:    Patient is a 20 year old male, domiciled with mother, step parent, and  (3) brothers, unemployed, not a college student, with a dx of borderline personality and MDD, with a recent psychiatric hospitalization at 6 weeks ago a [facility], BIB as a walk in for suicidal ideation.  Mother states that the patient has been non- compliant with medications for over a month. Mother states that the patient has issues with alcohol and drinks daily. Mother was unsure of the amount of alcohol but thinks it is about a bottle a day. Mother states that the patient sees a psychiatrist at [facility] with Dr. Posada. Mother states that the pt missed his virtual appointment with the doctor on Friday because he was intoxicated. Mother states that the patient has never followed up with rehab or detox. Mother states that the patient has a SA 2 years ago of slitting his wrists and placing a plastic bag over his head and duck tapping it. Mother states that at that time he drank 2 gallons of alcohol. Mother reports that the patient engages in SIB of cutting and has scars all over his arms. She reports that last night she had an intervention with the patient regarding his drinking. She states at that time the patient became verbally aggressive and was yelling in her face. Mother states that the patient was then speaking to his friend who lives in [place] and reported to the friend he was suicidal and wanted to cut his throat. Mother states that the patient has superficial cuts to his throat and arms. Mother states that the patient has been drinking alcohol of yesterday. Mother reports that she found marijuana, a razor, and alcohol in the patient’s room. She is unsure if the patient is presenting with . Mother denies that the patient has access to a gun or has legal issues. Mother denies that the patient is having VH. Mother states that the patient has been up all night and eating sporadically. She states that the patient has been resistant to getting help for his substance abuse issues. Mother denies that the patient told her that he was suicidal yesterday. Mother states that his friend contacted his brother and this is how she became aware of his suicidal thoughts. Mother states that he has issues with his liver enzymes. Case discussed with Dr. Hall. Patient is cleared psychiatrically for discharge. Worker informed pt’s mother of patient’s psychiatric clearance.  Worker also contacted sbirt on call and spoke to veronique who states that once pt is cleared he can talk to patient via phone. Mother states that patient can Uber home when discharge.    Patient was cleared for discharge as per LOS Brown. Patient was provided with substance referrals as per team.

## 2021-04-17 NOTE — ED PROVIDER NOTE - CARE PLAN
Principal Discharge DX:	Adjustment disorder with disturbance of conduct  Secondary Diagnosis:	Alcohol abuse  Secondary Diagnosis:	Borderline personality disorder

## 2021-04-17 NOTE — ED BEHAVIORAL HEALTH ASSESSMENT NOTE - NSPRESENTSXS_PSY_ALL_CORE
Depressed mood/Anhedonia/Impulsivity/Refusal or inability to complete safety plan Impulsivity/Refusal or inability to complete safety plan

## 2021-04-17 NOTE — ED BEHAVIORAL HEALTH ASSESSMENT NOTE - PRIMARY DX
Moderate episode of recurrent major depressive disorder Adjustment disorder with disturbance of conduct

## 2021-04-17 NOTE — ED PROVIDER NOTE - CLINICAL SUMMARY MEDICAL DECISION MAKING FREE TEXT BOX
19 y/o M admits to alcohol intoxication and verbal altercation with mom. Mom concerned for decomposition and not compliant with medications. Plan to obtain labs, alcohol levels and psych to eval when sober.

## 2021-04-18 LAB
COVID-19 NUCLEOCAPSID GAM AB INTERP: NEGATIVE — SIGNIFICANT CHANGE UP
COVID-19 NUCLEOCAPSID TOTAL GAM ANTIBODY RESULT: 0.16 INDEX — SIGNIFICANT CHANGE UP
SARS-COV-2 IGG+IGM SERPL QL IA: 0.16 INDEX — SIGNIFICANT CHANGE UP
SARS-COV-2 IGG+IGM SERPL QL IA: NEGATIVE — SIGNIFICANT CHANGE UP

## 2021-05-17 PROCEDURE — ZZZZZ: CPT

## 2021-07-06 NOTE — PATIENT PROFILE BEHAVIORAL HEALTH - NS TRANSFER PATIENT BELONGINGS
see inventory sheet/Clothing Split-Thickness Skin Graft Text: The defect edges were debeveled with a #15 scalpel blade.  Given the location of the defect, shape of the defect and the proximity to free margins a split thickness skin graft was deemed most appropriate.  Using a sterile surgical marker, the primary defect shape was transferred to the donor site. The split thickness graft was then harvested.  The skin graft was then placed in the primary defect and oriented appropriately.

## 2021-07-26 PROCEDURE — ZZZZZ: CPT

## 2021-07-30 PROCEDURE — 90834 PSYTX W PT 45 MINUTES: CPT

## 2021-08-05 PROCEDURE — 90853 GROUP PSYCHOTHERAPY: CPT | Mod: 95

## 2021-08-09 PROCEDURE — ZZZZZ: CPT

## 2021-08-13 PROCEDURE — 90834 PSYTX W PT 45 MINUTES: CPT

## 2021-08-16 PROCEDURE — 90834 PSYTX W PT 45 MINUTES: CPT

## 2021-08-16 PROCEDURE — 98968 PH1 ASSMT&MGMT NQHP 21-30: CPT

## 2021-09-15 PROCEDURE — 99214 OFFICE O/P EST MOD 30 MIN: CPT | Mod: 95

## 2021-10-13 ENCOUNTER — OUTPATIENT (OUTPATIENT)
Dept: OUTPATIENT SERVICES | Facility: HOSPITAL | Age: 21
LOS: 1 days | Discharge: HOME | End: 2021-10-13
Payer: MEDICAID

## 2022-01-20 DIAGNOSIS — F32.9 MAJOR DEPRESSIVE DISORDER, SINGLE EPISODE, UNSPECIFIED: ICD-10-CM

## 2022-02-19 ENCOUNTER — EMERGENCY (EMERGENCY)
Facility: HOSPITAL | Age: 22
LOS: 0 days | Discharge: HOME | End: 2022-02-19
Attending: STUDENT IN AN ORGANIZED HEALTH CARE EDUCATION/TRAINING PROGRAM
Payer: COMMERCIAL

## 2022-02-19 VITALS
DIASTOLIC BLOOD PRESSURE: 81 MMHG | SYSTOLIC BLOOD PRESSURE: 124 MMHG | HEIGHT: 72 IN | RESPIRATION RATE: 16 BRPM | TEMPERATURE: 87 F | HEART RATE: 74 BPM | WEIGHT: 145.06 LBS

## 2022-02-19 VITALS
RESPIRATION RATE: 18 BRPM | SYSTOLIC BLOOD PRESSURE: 115 MMHG | DIASTOLIC BLOOD PRESSURE: 68 MMHG | OXYGEN SATURATION: 99 % | HEART RATE: 87 BPM

## 2022-02-19 DIAGNOSIS — F60.3 BORDERLINE PERSONALITY DISORDER: ICD-10-CM

## 2022-02-19 DIAGNOSIS — Z20.822 CONTACT WITH AND (SUSPECTED) EXPOSURE TO COVID-19: ICD-10-CM

## 2022-02-19 DIAGNOSIS — F19.94 OTHER PSYCHOACTIVE SUBSTANCE USE, UNSPECIFIED WITH PSYCHOACTIVE SUBSTANCE-INDUCED MOOD DISORDER: ICD-10-CM

## 2022-02-19 DIAGNOSIS — F10.10 ALCOHOL ABUSE, UNCOMPLICATED: ICD-10-CM
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
## 2022-02-19 DIAGNOSIS — S61.502A UNSPECIFIED OPEN WOUND OF LEFT WRIST, INITIAL ENCOUNTER: ICD-10-CM

## 2022-02-19 DIAGNOSIS — F10.129 ALCOHOL ABUSE WITH INTOXICATION, UNSPECIFIED: ICD-10-CM

## 2022-02-19 LAB
ALBUMIN SERPL ELPH-MCNC: 4.8 G/DL — SIGNIFICANT CHANGE UP (ref 3.3–5)
ALP SERPL-CCNC: 54 U/L — SIGNIFICANT CHANGE UP (ref 40–120)
ALT FLD-CCNC: 38 U/L — SIGNIFICANT CHANGE UP (ref 12–78)
AMPHET UR-MCNC: NEGATIVE — SIGNIFICANT CHANGE UP
ANION GAP SERPL CALC-SCNC: 12 MMOL/L — SIGNIFICANT CHANGE UP (ref 5–17)
APAP SERPL-MCNC: < 2 UG/ML (ref 10–30)
AST SERPL-CCNC: 54 U/L — HIGH (ref 15–37)
BARBITURATES UR SCN-MCNC: NEGATIVE — SIGNIFICANT CHANGE UP
BASOPHILS # BLD AUTO: 0.03 K/UL — SIGNIFICANT CHANGE UP (ref 0–0.2)
BASOPHILS NFR BLD AUTO: 0.2 % — SIGNIFICANT CHANGE UP (ref 0–2)
BENZODIAZ UR-MCNC: NEGATIVE — SIGNIFICANT CHANGE UP
BILIRUB SERPL-MCNC: 0.4 MG/DL — SIGNIFICANT CHANGE UP (ref 0.2–1.2)
BUN SERPL-MCNC: 16 MG/DL — SIGNIFICANT CHANGE UP (ref 7–23)
CALCIUM SERPL-MCNC: 9.4 MG/DL — SIGNIFICANT CHANGE UP (ref 8.5–10.1)
CHLORIDE SERPL-SCNC: 108 MMOL/L — SIGNIFICANT CHANGE UP (ref 96–108)
CO2 SERPL-SCNC: 23 MMOL/L — SIGNIFICANT CHANGE UP (ref 22–31)
COCAINE METAB.OTHER UR-MCNC: NEGATIVE — SIGNIFICANT CHANGE UP
CREAT SERPL-MCNC: 1.13 MG/DL — SIGNIFICANT CHANGE UP (ref 0.5–1.3)
EOSINOPHIL # BLD AUTO: 0.02 K/UL — SIGNIFICANT CHANGE UP (ref 0–0.5)
EOSINOPHIL NFR BLD AUTO: 0.1 % — SIGNIFICANT CHANGE UP (ref 0–6)
ETHANOL SERPL-MCNC: 188 MG/DL — HIGH (ref 0–10)
FLUAV AG NPH QL: SIGNIFICANT CHANGE UP
FLUBV AG NPH QL: SIGNIFICANT CHANGE UP
GLUCOSE BLDC GLUCOMTR-MCNC: 105 MG/DL — HIGH (ref 70–99)
GLUCOSE BLDC GLUCOMTR-MCNC: 215 MG/DL — HIGH (ref 70–99)
GLUCOSE SERPL-MCNC: 123 MG/DL — HIGH (ref 70–99)
HCT VFR BLD CALC: 50 % — SIGNIFICANT CHANGE UP (ref 39–50)
HGB BLD-MCNC: 16.7 G/DL — SIGNIFICANT CHANGE UP (ref 13–17)
IMM GRANULOCYTES NFR BLD AUTO: 0.7 % — SIGNIFICANT CHANGE UP (ref 0–1.5)
LYMPHOCYTES # BLD AUTO: 18 % — SIGNIFICANT CHANGE UP (ref 13–44)
LYMPHOCYTES # BLD AUTO: 2.43 K/UL — SIGNIFICANT CHANGE UP (ref 1–3.3)
MCHC RBC-ENTMCNC: 30 PG — SIGNIFICANT CHANGE UP (ref 27–34)
MCHC RBC-ENTMCNC: 33.4 G/DL — SIGNIFICANT CHANGE UP (ref 32–36)
MCV RBC AUTO: 89.9 FL — SIGNIFICANT CHANGE UP (ref 80–100)
METHADONE UR-MCNC: NEGATIVE — SIGNIFICANT CHANGE UP
MONOCYTES # BLD AUTO: 0.58 K/UL — SIGNIFICANT CHANGE UP (ref 0–0.9)
MONOCYTES NFR BLD AUTO: 4.3 % — SIGNIFICANT CHANGE UP (ref 2–14)
NEUTROPHILS # BLD AUTO: 10.35 K/UL — HIGH (ref 1.8–7.4)
NEUTROPHILS NFR BLD AUTO: 76.7 % — SIGNIFICANT CHANGE UP (ref 43–77)
NRBC # BLD: 0 /100 WBCS — SIGNIFICANT CHANGE UP (ref 0–0)
OPIATES UR-MCNC: NEGATIVE — SIGNIFICANT CHANGE UP
PCP SPEC-MCNC: SIGNIFICANT CHANGE UP
PCP UR-MCNC: NEGATIVE — SIGNIFICANT CHANGE UP
PLATELET # BLD AUTO: 249 K/UL — SIGNIFICANT CHANGE UP (ref 150–400)
POTASSIUM SERPL-MCNC: 3.2 MMOL/L — LOW (ref 3.5–5.3)
POTASSIUM SERPL-SCNC: 3.2 MMOL/L — LOW (ref 3.5–5.3)
PROT SERPL-MCNC: 8.9 GM/DL — HIGH (ref 6–8.3)
RBC # BLD: 5.56 M/UL — SIGNIFICANT CHANGE UP (ref 4.2–5.8)
RBC # FLD: 13.3 % — SIGNIFICANT CHANGE UP (ref 10.3–14.5)
SALICYLATES SERPL-MCNC: <1.7 MG/DL — LOW (ref 2.8–20)
SARS-COV-2 RNA SPEC QL NAA+PROBE: SIGNIFICANT CHANGE UP
SODIUM SERPL-SCNC: 143 MMOL/L — SIGNIFICANT CHANGE UP (ref 135–145)
THC UR QL: NEGATIVE — SIGNIFICANT CHANGE UP
WBC # BLD: 13.51 K/UL — HIGH (ref 3.8–10.5)
WBC # FLD AUTO: 13.51 K/UL — HIGH (ref 3.8–10.5)

## 2022-02-19 PROCEDURE — 93010 ELECTROCARDIOGRAM REPORT: CPT

## 2022-02-19 PROCEDURE — 90792 PSYCH DIAG EVAL W/MED SRVCS: CPT | Mod: 95

## 2022-02-19 PROCEDURE — 99284 EMERGENCY DEPT VISIT MOD MDM: CPT

## 2022-02-19 RX ORDER — THIAMINE MONONITRATE (VIT B1) 100 MG
100 TABLET ORAL ONCE
Refills: 0 | Status: COMPLETED | OUTPATIENT
Start: 2022-02-19 | End: 2022-02-19

## 2022-02-19 RX ORDER — SODIUM CHLORIDE 9 MG/ML
1000 INJECTION INTRAMUSCULAR; INTRAVENOUS; SUBCUTANEOUS ONCE
Refills: 0 | Status: COMPLETED | OUTPATIENT
Start: 2022-02-19 | End: 2022-02-19

## 2022-02-19 RX ORDER — CALCIUM GLUCONATE 100 MG/ML
2 VIAL (ML) INTRAVENOUS ONCE
Refills: 0 | Status: COMPLETED | OUTPATIENT
Start: 2022-02-19 | End: 2022-02-19

## 2022-02-19 RX ORDER — ALBUTEROL 90 UG/1
2 AEROSOL, METERED ORAL ONCE
Refills: 0 | Status: COMPLETED | OUTPATIENT
Start: 2022-02-19 | End: 2022-02-19

## 2022-02-19 RX ADMIN — Medication 200 GRAM(S): at 11:04

## 2022-02-19 RX ADMIN — Medication 1 TABLET(S): at 10:18

## 2022-02-19 RX ADMIN — Medication 100 MILLIGRAM(S): at 10:18

## 2022-02-19 RX ADMIN — ALBUTEROL 2 PUFF(S): 90 AEROSOL, METERED ORAL at 11:10

## 2022-02-19 RX ADMIN — SODIUM CHLORIDE 1000 MILLILITER(S): 9 INJECTION INTRAMUSCULAR; INTRAVENOUS; SUBCUTANEOUS at 12:44

## 2022-02-19 NOTE — ED ADULT TRIAGE NOTE - BEFAST SPEECH PHRASE
Elise GARCIA RN 7/13/2021 9:19 AM CDT    Please see telephone encounter as well  ----- Message -----  From: Georgette Hernandez  Sent: 7/13/2021 9:06 AM CDT  To: Elise GARCIA RN  Subject: RE:Please take and send pictures of Mom's ar*    Good morning,     My mom woke up again fully broken out. At this time, I believe this has to be a reaction from the pacemaker. Maybe her body is reacting to having a foreign object inside and it's not taking well. She has never had this issue before, she has not tried anything new, she had not eaten anything different and when she takes the benadryl it subsides but returns.     The javier that you will see that's brownish is releasing pus, which I don't understand since that's not her incision sight. Please advise when you have a moment. We do see her PCP today at 3PM    She is not having any other symptoms outside of the elevation/rash of the skin.    
Spoke to pt daughter who stated that the red line area that had the brown discharge is located on the skin above the groin area. Pt daughter states that she does not see an open puncture site with drainage. Pt daughter states that pt has been having rashes to her body for over 1 week and has been taking Benedryl, which makes the rash go away, but then comes right back. Informed pt daughter that pt should not be having a skin reaction from the leadless pacemaker that was implanted on 7/1/2021. Instructed pt daughter to keep 3pm appt with pts PCP today to further evaluate possible causes of rash. Pt daughter verbalized understanding and and states other than the rash, pt feels fine.   Dr. Morgan notified.  
Yes

## 2022-02-19 NOTE — ED ADULT TRIAGE NOTE - CHIEF COMPLAINT QUOTE
brought by ems for alcohol intoxication. pt was found on the street bystander called 911. fsbs on scene was 48 and one amp of d50 and one tube of glucose paste . pt is also c/o of suicide ideation . pt have self injury marks on the left wrist .

## 2022-02-19 NOTE — ED BEHAVIORAL HEALTH ASSESSMENT NOTE - CURRENT MEDICATION
reports he was previously on Zoloft, Naltrexone, Trazodone, states he stopped January due to financial issues but plans to re-engage in care in March, has appointment

## 2022-02-19 NOTE — ED PROVIDER NOTE - OBJECTIVE STATEMENT
20 yo M w/PMHx of borderline personality disorder, anxiety, depression, self reported suicide attempts presents to the ED BIBEMS s/p found outside on the street. Pt admits to EtOH use last night, stating he drank "quite a lot." Pt denies illicit drug use. Noted to have cuts to the left wrist and states he attempted self harm last night. Pt states he has been hospitalized for suicidal attempts in the past.

## 2022-02-19 NOTE — ED BEHAVIORAL HEALTH ASSESSMENT NOTE - RISK ASSESSMENT
Low Acute Suicide Risk RF: history of SA, hx cutting, hx psych hospitalizations, hx substance use  PF: currently no thoughts of self harm/SI, future oriented, engaged in therapy and pt feels it is going well, has gf, denies HI, able to complete safety plan, actively advocating for needs-- asking to find his phone and vaccination card

## 2022-02-19 NOTE — ED PROVIDER NOTE - PATIENT PORTAL LINK FT
You can access the FollowMyHealth Patient Portal offered by [facility] by registering at the following website: [email or web address]. By joining [facility]’s FollowMyHealth portal, you will also be able to view your health information using other applications (apps) compatible with our system.
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
## 2022-02-19 NOTE — ED BEHAVIORAL HEALTH ASSESSMENT NOTE - SAFETY PLAN ADDT'L DETAILS
Safety plan discussed with.../Education provided regarding environmental safety / lethal means restriction/Provision of National Suicide Prevention Lifeline 1-989-523-XWAI (3273)

## 2022-02-19 NOTE — ED BEHAVIORAL HEALTH ASSESSMENT NOTE - DETAILS
discussed see chart deferred patient reports hx cutting last 2 weeks ago, denies this being with suicidal intent but does indicate self harm behavior

## 2022-02-19 NOTE — ED PROVIDER NOTE - NSICDXPASTMEDICALHX_GEN_ALL_CORE_FT
Neonatology Consultation    Name: Ellie Parekh   Medical Record Number: [id number]   YOB: 2023  Today's Date: 2021                                                                 Date of Consultation:  2021  Time: 8:53 PM  Attending MD: CESAR SharpP-BC  Referring Physician: Kaz Edmond  Reason for Consultation: Delayed transition after     Subjective:     Prenatal Labs:    Information for the patient's mother:  Gifty Quintero [[id number]]     Lab Results   Component Value Date/Time    ABO/Rh(D) O POSITIVE 2021 05:05 PM    HBsAg, External Negative 2020    HIV, External Non Reactive 2020    Rubella, External Non-Immune 2020    Gonorrhea, External Negative 2020    Chlamydia, External Positive 2020    GrBStrep, External Negative 2021    ABO,Rh O Positive 2020        Age: 0 days  /Para:   Information for the patient's mother:  Gifty Quintero [[id number]]         Estimated Date Conception:   Information for the patient's mother:  Gifty Quintero [[id number]]   Estimated Date of Delivery: 21      Estimated Gestation:  Information for the patient's mother:  Gifty Quintero [[id number]]   39w2d        Objective:     Medications:   Current Facility-Administered Medications   Medication Dose Route Frequency    hepatitis B virus vaccine (PF) (ENGERIX) DHEC syringe 10 mcg  0.5 mL IntraMUSCular PRIOR TO DISCHARGE    erythromycin (ILOTYCIN) 5 mg/gram (0.5 %) ophthalmic ointment   Both Eyes Once at Delivery    phytonadione (vitamin K1) (AQUA-MEPHYTON) injection 1 mg  1 mg IntraMUSCular Once at Delivery     Anesthesia: []  None      []  Local          [x]  Epidural/Spinal   []   General Anesthesia   Delivery:      []  Vaginal   [x]    []  Forceps              []    Vacuum  Rupture of Membrane: 3/31/21 @ 0730   Meconium Stained: clear    Resuscitation:   Apgars: unassigned at the time of this note  Oxygen: []  Free Flow   [x]  Bag & Mask   []  Intubation   Suction: [x]  Bulb             []  Tracheal         []   Deep      Meconium below cord:  [] No   []  Yes  [x]  N/A   Delayed Cord Clamping 0 seconds. Physical Exam:   [x]  Grossly WNL   []  See  admission exam    []  Full exam by PMD  Dysmorphic Features:  []  No   []  Yes      Remarkable findings: None     Assessment:     Upon entry to L&D OR at about 4 minutes 30 seconds of life, infant on warming table with PPV being provided by bedside RN, pulse oximeter on right hand and anesthesia providing chest compressions. Color pale/grey, no respiratory effort, no rise and fall with PPV. HR evaluation by bedside RN performed with HR auscultated in 120's and 150's by pulse oximetry with saturations in 70's with 100% FiO2; chest compressions discontinued. NRP corrective measures taken to include bulb suctioning mouth, opening mouth when applying CPAP, infant responded with color turning pink, spontaneous respiratory effort, and cry. CPAP weaned to room air. Plan:     Transition infant in NICU x 2 hours, if stable routine  care. Mother initially declined observation in NICU, informed this is safest way to ensure infant is transitioning appropriately and if intervention is needed, can be provided quickly. Father also expressed concern regarding  mother and infant and reinforced safest choice for infant at this time. Father accompanied infant to NICU.     Anitha Macario NP  2021  9:01 PM PAST MEDICAL HISTORY:  Borderline personality disorder     Depression

## 2022-02-19 NOTE — ED BEHAVIORAL HEALTH ASSESSMENT NOTE - HPI (INCLUDE ILLNESS QUALITY, SEVERITY, DURATION, TIMING, CONTEXT, MODIFYING FACTORS, ASSOCIATED SIGNS AND SYMPTOMS)
20yo M, domiciled with father (father confirms), unemployed, non caregiver, denies past medical history, past psychiatric history of depression, borderline personality disorder, alcohol abuse, hx past psychiatric hospitalizations (March 2021), followed afterwards at [facility], currently in [facility] program, currently seeing therapist Dr. Beasley 2x a week (Monday/Thursday), in [facility] program, hx NSSIB and SA via asphyxiation w/plastic bag and cutting, no hx aggression/violence, no hx detox/rehab/withdrawal, no legal issues who was BIBEMS activated by bystander for alcohol intoxication.  on presentation. Patient found to have some superficial cuts on R wrist, not requiring sutures.    On interview by telepsych patient is AOx3, linear, forthcoming. States that he was out last night for a friends birthday party and he had too much to drink. Had 10-15 shots of gin. Patient reports that he does not recall cutting himself. He denies any thoughts of harm himself or any suicidal thoughts at this time. Patient reports that the last time he cut himself was 2 weeks ago while feeling frustrated, does not recall specifics, did not require sutures, denies being with suicidal intent. Patient reports that last that he remembers from last night he was on his way home, was about to take the bus but didn't feel well, tried to get a ride from police but they refused, so then decided he was going to sit down to try to catch his breath and passed out. Patient asks me what happened next and how he ended up in the ER. Patient states he did not intend to drink so much, was actively seeking help. He reports struggling with depressed mood for a long time but denies any changes in mood prior to alcohol use or any stressors at the party. Patient denies any AH, VH, paranoia, feeling confused. Patient denies any thoughts of harming others or HI. Patient states that he chronically has difficulty with rapport with his mother and asks that I call his father with whom he lives. Patient states that he is worried that he has lost his phone and his vaccination card and asks if there is a way he can recover these. Patient states that he would also like to be able to speak to his father and therapist as they will probably be concerned. Pt reports that he sees Dr. Beasley regularly Mondays and Thursday, has good rapport with her and finds sessions helpful. Patient reports that he also has seen psychiatrist in the past but has been off medications since January due to financial issues and not being sure if medications are helping him. Pt reports that he does have followup appointment with a psychiatrist Dr. Romero in March though to reconnect for services. Pt denies any other substance use. He reports enjoying writing (this helps him the most), playing video games, wanting to get back into [employer]. Also has a girlfriend, Philip, relationship is going well. He denies any changes in sleep or appetite recently. He presented matter of fact and was able to safety plan.     Spoke to father Jez . He reports that patient does live with him and was "fine" yesterday. He denies patient having any difficulties with ADLs. He denies patient recently endorsing any thoughts of self harm or SI or changes in behavior. He denies noticing any recent self harm. He reports that patient has been engaged in therapy. Reports "I think he probably drank too much." He does not have any safety concerns at this time and is willing to accept patient back home.

## 2022-02-19 NOTE — ED PROVIDER NOTE - CLINICAL SUMMARY MEDICAL DECISION MAKING FREE TEXT BOX
Basic labs, blood alcohol, urine drug screen, Psych consult when sober and place on 1 to 1 to for constant observation. Basic labs, blood alcohol, urine drug screen, Psych consult when sober and place on 1 to 1 to for constant observation.    Peaked T waves on EKG treated with calcium gluconate and albuterol for presumed hyperkalemia. Labs show hypokalemia but given hypothermia and electrolyte shifts, will not plan to replete k

## 2022-02-19 NOTE — ED ADULT NURSE NOTE - OBJECTIVE STATEMENT
21 year old male hx borderline personality disorder, anxiety, depression. Patient made attempt of suicide with cuts to left wrist in the past. Patient states last drinking a lot last night. Patient noted to be hypothermic. Denies drug illicit drug use. Denies SOB, CP, or fevers.

## 2022-02-19 NOTE — ED PROVIDER NOTE - PHYSICAL EXAMINATION
VITALS: reviewed  GEN: Cold to the touch  HEAD/EYES: NCAT, PERRL, EOMI, anicteric sclerae, no conjunctival pallor  ENT: mucus membranes moist, oropharynx WNL, trachea midline, no JVD  RESP: lungs CTA with equal breath sounds bilaterally, chest wall nontender and atraumatic  CV: heart with reg rhythm S1, S2, no murmur; distal pulses intact and symmetric bilaterally  ABDOMEN: normoactive bowel sounds, soft, nondistended, nontender, no palpable masses  : no CVAT  MSK: extremities atraumatic and nontender, no edema, no asymmetry. the back is without midline or lateral tenderness, there is no spinal deformity or stepoff and the back is ranged painlessly. the neck has no midline tenderness, deformity, or stepoff, and is ranged painlessly.  SKIN: Healed cuts up the left arm, x3 fresh wounds to distal wrist.  NEURO: alert, mentating appropriately, no facial asymmetry. gross sensation, motor, coordination are intact  PSYCH: Affect appropriate

## 2022-02-19 NOTE — ED PROVIDER NOTE - PROGRESS NOTE DETAILS
Marquez: appreciate psych c/s, per psych pt cleared for dc, will follow up with his therapist. dc with behavior plan. pt calm and cooperative in dc, denies SI/Hi

## 2022-02-19 NOTE — ED PROVIDER NOTE - NSFOLLOWUPINSTRUCTIONS_ED_ALL_ED_FT
Depression    Depression is a mental illness that usually causes feelings of sadness, hopelessness, or helplessness. Some people with this disorder do not feel particularly sad but lose interest in doing things they used to enjoy. Major depressive disorder also can cause physical symptoms. It can interfere with work, school, relationships, and other normal everyday activities. If you were started on a medication, make sure to take exactly as prescribed and follow up with a psychiatrist.    SEEK IMMEDIATE MEDICAL CARE IF YOU HAVE ANY OF THE FOLLOWING SYMPTOMS: thoughts about hurting or killing yourself, thoughts about hurting or killing somebody else, hallucinations, or worsening depression.    Alcohol Abuse    Alcohol intoxication occurs when the amount of alcohol that a person has consumed impairs his or her ability to mentally and physically function. Chronic alcohol consumption can also lead to a variety of health issues including neurological disease, stomach disease, heart disease, liver disease, etc. Do not drive after drinking alcohol. Drinking enough alcohol to end up in an Emergency Room suggests you may have an alcohol abuse problem. Seek help at a drug addiction center.    SEEK IMMEDIATE MEDICAL CARE IF YOU HAVE ANY OF THE FOLLOWING SYMPTOMS: seizures, vomiting blood, blood in your stool, lightheadedness/dizziness, or becoming shaky to tremulous when you stop drinking.

## 2022-02-19 NOTE — ED BEHAVIORAL HEALTH ASSESSMENT NOTE - REFERRAL / APPOINTMENT DETAILS
continue 2x week therapy, reports f/up with outpatient psychiatrist, provided psychoeducation on substance use

## 2022-02-19 NOTE — ED BEHAVIORAL HEALTH ASSESSMENT NOTE - SUMMARY
22yo M, domiciled with father (father confirms), unemployed, non caregiver, denies past medical history, past psychiatric history of depression, borderline personality disorder, alcohol abuse, hx past psychiatric hospitalizations (March 2021), followed afterwards at [facility], currently in [facility] program, currently seeing therapist Dr. Beasley 2x a week (Monday/Thursday), in [facility] program, hx NSSIB and SA via asphyxiation w/plastic bag and cutting, no hx aggression/violence, no hx detox/rehab/withdrawal, no legal issues who was BIBEMS activated by bystander for alcohol intoxication.  on presentation. Patient found to have some superficial cuts on R wrist, not requiring sutures.    At this time pt does not present with any acute mood or psychotic symptoms. He is AOx3, linear, forthcoming, has had some time to process alcohol (initially presented with elevated BAL). Patient reports that he was out with friends, drank too much and passed out on his way home. Does not recall cutting self. Last cutting 2 weeks ago. Denies any SI and states that he drank too much as a mistake. Pt reports that he feels that he is doing well, seeing therapist 2x a week, has upcoming appt with psychiatrist, has gf, currently living with father. I provided some psychoeducation on substance use. Currently he does not meet criteria for involuntary psych admission. Collateral was obtained from father who did not have any safety concerns. Patient will be treat and release. Safety plan completed. Patient presented future oriented, would like to recover phone so that he has phone number and access to contacting people. Declined voluntary admission.

## 2022-02-22 LAB — DRUG SCREEN, SERUM: SIGNIFICANT CHANGE UP

## 2022-03-08 ENCOUNTER — INPATIENT (INPATIENT)
Facility: HOSPITAL | Age: 22
LOS: 8 days | Discharge: HOME | End: 2022-03-17
Attending: PSYCHIATRY & NEUROLOGY | Admitting: PSYCHIATRY & NEUROLOGY
Payer: COMMERCIAL

## 2022-03-08 VITALS
SYSTOLIC BLOOD PRESSURE: 137 MMHG | RESPIRATION RATE: 18 BRPM | OXYGEN SATURATION: 100 % | HEIGHT: 72 IN | TEMPERATURE: 98 F | HEART RATE: 111 BPM | DIASTOLIC BLOOD PRESSURE: 82 MMHG

## 2022-03-08 LAB
ALBUMIN SERPL ELPH-MCNC: 5.1 G/DL — HIGH (ref 3.3–5)
ALP SERPL-CCNC: 56 U/L — SIGNIFICANT CHANGE UP (ref 40–120)
ALT FLD-CCNC: 21 U/L — SIGNIFICANT CHANGE UP (ref 4–41)
AMPHET UR-MCNC: NEGATIVE — SIGNIFICANT CHANGE UP
ANION GAP SERPL CALC-SCNC: 15 MMOL/L — HIGH (ref 7–14)
APAP SERPL-MCNC: <10 UG/ML — LOW (ref 15–25)
APPEARANCE UR: CLEAR — SIGNIFICANT CHANGE UP
AST SERPL-CCNC: 28 U/L — SIGNIFICANT CHANGE UP (ref 4–40)
BARBITURATES UR SCN-MCNC: NEGATIVE — SIGNIFICANT CHANGE UP
BASOPHILS # BLD AUTO: 0.02 K/UL — SIGNIFICANT CHANGE UP (ref 0–0.2)
BASOPHILS NFR BLD AUTO: 0.5 % — SIGNIFICANT CHANGE UP (ref 0–2)
BENZODIAZ UR-MCNC: NEGATIVE — SIGNIFICANT CHANGE UP
BILIRUB SERPL-MCNC: 0.3 MG/DL — SIGNIFICANT CHANGE UP (ref 0.2–1.2)
BILIRUB UR-MCNC: NEGATIVE — SIGNIFICANT CHANGE UP
BUN SERPL-MCNC: 8 MG/DL — SIGNIFICANT CHANGE UP (ref 7–23)
CALCIUM SERPL-MCNC: 9.3 MG/DL — SIGNIFICANT CHANGE UP (ref 8.4–10.5)
CHLORIDE SERPL-SCNC: 104 MMOL/L — SIGNIFICANT CHANGE UP (ref 98–107)
CO2 SERPL-SCNC: 27 MMOL/L — SIGNIFICANT CHANGE UP (ref 22–31)
COCAINE METAB.OTHER UR-MCNC: NEGATIVE — SIGNIFICANT CHANGE UP
COLOR SPEC: SIGNIFICANT CHANGE UP
CREAT SERPL-MCNC: 1.08 MG/DL — SIGNIFICANT CHANGE UP (ref 0.5–1.3)
CREATININE URINE RESULT, DAU: 90 MG/DL — SIGNIFICANT CHANGE UP
DIFF PNL FLD: NEGATIVE — SIGNIFICANT CHANGE UP
EGFR: 99 ML/MIN/1.73M2 — SIGNIFICANT CHANGE UP
EOSINOPHIL # BLD AUTO: 0.05 K/UL — SIGNIFICANT CHANGE UP (ref 0–0.5)
EOSINOPHIL NFR BLD AUTO: 1.3 % — SIGNIFICANT CHANGE UP (ref 0–6)
ETHANOL SERPL-MCNC: 303 MG/DL — HIGH
FLUAV AG NPH QL: SIGNIFICANT CHANGE UP
FLUBV AG NPH QL: SIGNIFICANT CHANGE UP
GLUCOSE SERPL-MCNC: 104 MG/DL — HIGH (ref 70–99)
GLUCOSE UR QL: NEGATIVE — SIGNIFICANT CHANGE UP
HCT VFR BLD CALC: 44.6 % — SIGNIFICANT CHANGE UP (ref 39–50)
HGB BLD-MCNC: 15.1 G/DL — SIGNIFICANT CHANGE UP (ref 13–17)
IANC: 1.26 K/UL — LOW (ref 1.5–8.5)
IMM GRANULOCYTES NFR BLD AUTO: 0.3 % — SIGNIFICANT CHANGE UP (ref 0–1.5)
KETONES UR-MCNC: NEGATIVE — SIGNIFICANT CHANGE UP
LEUKOCYTE ESTERASE UR-ACNC: NEGATIVE — SIGNIFICANT CHANGE UP
LYMPHOCYTES # BLD AUTO: 2.13 K/UL — SIGNIFICANT CHANGE UP (ref 1–3.3)
LYMPHOCYTES # BLD AUTO: 56.1 % — HIGH (ref 13–44)
MCHC RBC-ENTMCNC: 30.1 PG — SIGNIFICANT CHANGE UP (ref 27–34)
MCHC RBC-ENTMCNC: 33.9 GM/DL — SIGNIFICANT CHANGE UP (ref 32–36)
MCV RBC AUTO: 89 FL — SIGNIFICANT CHANGE UP (ref 80–100)
METHADONE UR-MCNC: NEGATIVE — SIGNIFICANT CHANGE UP
MONOCYTES # BLD AUTO: 0.33 K/UL — SIGNIFICANT CHANGE UP (ref 0–0.9)
MONOCYTES NFR BLD AUTO: 8.7 % — SIGNIFICANT CHANGE UP (ref 2–14)
NEUTROPHILS # BLD AUTO: 1.26 K/UL — LOW (ref 1.8–7.4)
NEUTROPHILS NFR BLD AUTO: 33.1 % — LOW (ref 43–77)
NITRITE UR-MCNC: NEGATIVE — SIGNIFICANT CHANGE UP
NRBC # BLD: 0 /100 WBCS — SIGNIFICANT CHANGE UP
NRBC # FLD: 0 K/UL — SIGNIFICANT CHANGE UP
OPIATES UR-MCNC: NEGATIVE — SIGNIFICANT CHANGE UP
OXYCODONE UR-MCNC: NEGATIVE — SIGNIFICANT CHANGE UP
PCP SPEC-MCNC: SIGNIFICANT CHANGE UP
PCP UR-MCNC: NEGATIVE — SIGNIFICANT CHANGE UP
PH UR: 6.5 — SIGNIFICANT CHANGE UP (ref 5–8)
PLATELET # BLD AUTO: 269 K/UL — SIGNIFICANT CHANGE UP (ref 150–400)
POTASSIUM SERPL-MCNC: 3.8 MMOL/L — SIGNIFICANT CHANGE UP (ref 3.5–5.3)
POTASSIUM SERPL-SCNC: 3.8 MMOL/L — SIGNIFICANT CHANGE UP (ref 3.5–5.3)
PROT SERPL-MCNC: 7.7 G/DL — SIGNIFICANT CHANGE UP (ref 6–8.3)
PROT UR-MCNC: NEGATIVE — SIGNIFICANT CHANGE UP
RBC # BLD: 5.01 M/UL — SIGNIFICANT CHANGE UP (ref 4.2–5.8)
RBC # FLD: 13.6 % — SIGNIFICANT CHANGE UP (ref 10.3–14.5)
RSV RNA NPH QL NAA+NON-PROBE: SIGNIFICANT CHANGE UP
SALICYLATES SERPL-MCNC: <0.3 MG/DL — LOW (ref 15–30)
SARS-COV-2 RNA SPEC QL NAA+PROBE: SIGNIFICANT CHANGE UP
SODIUM SERPL-SCNC: 146 MMOL/L — HIGH (ref 135–145)
SP GR SPEC: 1.01 — SIGNIFICANT CHANGE UP (ref 1–1.05)
THC UR QL: NEGATIVE — SIGNIFICANT CHANGE UP
UROBILINOGEN FLD QL: SIGNIFICANT CHANGE UP
WBC # BLD: 3.8 K/UL — SIGNIFICANT CHANGE UP (ref 3.8–10.5)
WBC # FLD AUTO: 3.8 K/UL — SIGNIFICANT CHANGE UP (ref 3.8–10.5)

## 2022-03-08 PROCEDURE — 99285 EMERGENCY DEPT VISIT HI MDM: CPT | Mod: 25

## 2022-03-08 PROCEDURE — 12002 RPR S/N/AX/GEN/TRNK2.6-7.5CM: CPT

## 2022-03-08 NOTE — ED BEHAVIORAL HEALTH NOTE - BEHAVIORAL HEALTH NOTE
CELSO called pt's mother, Mitzi, at [phone] for collateral information.  Call rang and went to voice mail-CELSO left a message requesting a return phone call. CELSO called pt's mother, Mitzi, at [phone] for collateral information.  Call rang and went to voice mail-SW left a message requesting a return phone call.    Addendum:  SW received a call back from pt's mother.  She reports she was not aware that he was at the hospital.  She states pt has a history of depression, borderline, has a hx of abusing alcohol, as well as self-injurious behaviors.  She states that she had an argument with him the other day; states he was asking for money, but she refused to give it to him due to his hx of spending it on alcohol.  Mother reports that she is not aware of any specific threats of harm to self, however she feels he would benefit from inpatient hospitalization at this time due to his emotional instability.    Pt's mother was informed of pt's pending admission to [facility] pending bed availability.

## 2022-03-08 NOTE — ED PROVIDER NOTE - OBJECTIVE STATEMENT
20 yo m pmh borderline personality disorder, anxiety, depression, self reported suicide , pw si. reports difficult familial environment. reports being arguments with family. has been cutting his wrists. reports SI, unk plan. denies n/v, cp, sob, cough, congestion, ha, f/c. hx of SI in past, attempted hanging, admitted to [facility].

## 2022-03-08 NOTE — ED PROVIDER NOTE - CLINICAL SUMMARY MEDICAL DECISION MAKING FREE TEXT BOX
Trey Toth, DO: 20 yo m pmh borderline personality disorder, anxiety, depression, self reported suicide , pw si. reports difficult familial environment. reports being arguments with family. has been cutting his wrists. reports SI, unk plan. denies n/v, cp, sob, cough, congestion, ha, f/c. hx of SI in past, attempted hanging, admitted to [facility]. arrives hds, well appearing, aaox4, cooperative. PE as noted. will require lac repair, occurred within 24 hours he states. reports last tdap one year prior. psych consult, reassess.

## 2022-03-08 NOTE — ED PROVIDER NOTE - ADMIT DISPOSITION PRESENT ON ADMISSION SEPSIS
[No Acute Distress] : no acute distress [Normal Sclera/Conjunctiva] : normal sclera/conjunctiva [Normal Outer Ear/Nose] : the outer ears and nose were normal in appearance [No JVD] : no jugular venous distention [No Respiratory Distress] : no respiratory distress  No

## 2022-03-08 NOTE — ED PROVIDER NOTE - PHYSICAL EXAMINATION
General: well appearing, interactive, well nourished, no apparent distress, ncat  HEENT: EOMI, PERRLA, normal mucosa, normal oropharynx, no lesions on the lips or on oral mucosa, normal external ear  Neck: supple, no lymphadenopathy, full range of motion, no nuchal rigidity  CV: RRR, normal S1 and S2 with no murmur, capillary refill less than two seconds  Resp: lungs CTA b/l, good aeration bilaterally, symmetric chest wall   Abd: non-distended, soft, non-tender  : no CVA tenderness  MSK: full range of motion, no cyanosis, no edema, no clubbing, no immobility  Neuro: CN II-XII grossly intact, muscle strength 5/5 in all extremities, normal gait  Skin: multiple healed linear wounds over forearms. several linear wounds over forearm in earlier stages of healing, single 4 cm linear gaping superficial lac over dorsal aspect r forearm

## 2022-03-08 NOTE — ED ADULT NURSE REASSESSMENT NOTE - NS ED NURSE REASSESS COMMENT FT1
Pt observed sleeping in  room 3. Breathing even and unlabored, no visible signs of distress. Currently pending psychiatric evaluation.

## 2022-03-08 NOTE — ED PROCEDURE NOTE - ATTENDING CONTRIBUTION TO CARE
Trey Toth DO:  patient seen and evaluated with the PA and student.  I was present for key portions of the History & Physical, and I agree with the Impression & Plan.

## 2022-03-08 NOTE — ED ADULT NURSE NOTE - OBJECTIVE STATEMENT
Pt is a 20 yo male bib family via [facility] borderline personality disorder, anxiety, depression, and multiple past suicide attempts. Pt cut his right wrist today, requiring sutures.

## 2022-03-08 NOTE — ED PROVIDER NOTE - NS ED ROS FT
GENERAL: No fever or chills, //             EYES: no change in vision, //             HEENT: no trouble swallowing or speaking, //             CARDIAC: no chest pain, //              PULMONARY: no cough or SOB, //             GI: no abdominal pain, no nausea or no vomiting, no diarrhea or constipation, //             : No changes in urination,  //            SKIN: no rashes,  //            NEURO: no headache,  //             MSK: No joint pain otherwise as HPI or negative.      // psych: si ~Trey Toth, DO
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
## 2022-03-08 NOTE — ED PROVIDER NOTE - CARE PLAN
Principal Discharge DX:	Suicidal ideation  Secondary Diagnosis:	Superficial laceration of forearm   1

## 2022-03-09 DIAGNOSIS — F60.3 BORDERLINE PERSONALITY DISORDER: ICD-10-CM

## 2022-03-09 DIAGNOSIS — F10.20 ALCOHOL DEPENDENCE, UNCOMPLICATED: ICD-10-CM

## 2022-03-09 DIAGNOSIS — F43.10 POST-TRAUMATIC STRESS DISORDER, UNSPECIFIED: ICD-10-CM

## 2022-03-09 PROCEDURE — 99222 1ST HOSP IP/OBS MODERATE 55: CPT

## 2022-03-09 RX ORDER — DIPHENHYDRAMINE HCL 50 MG
50 CAPSULE ORAL EVERY 6 HOURS
Refills: 0 | Status: DISCONTINUED | OUTPATIENT
Start: 2022-03-09 | End: 2022-03-17

## 2022-03-09 RX ORDER — ACETAMINOPHEN 500 MG
650 TABLET ORAL EVERY 6 HOURS
Refills: 0 | Status: DISCONTINUED | OUTPATIENT
Start: 2022-03-09 | End: 2022-03-17

## 2022-03-09 RX ORDER — VENLAFAXINE HCL 75 MG
75 CAPSULE, EXT RELEASE 24 HR ORAL ONCE
Refills: 0 | Status: DISCONTINUED | OUTPATIENT
Start: 2022-03-09 | End: 2022-03-09

## 2022-03-09 RX ORDER — DIPHENHYDRAMINE HCL 50 MG
50 CAPSULE ORAL ONCE
Refills: 0 | Status: DISCONTINUED | OUTPATIENT
Start: 2022-03-09 | End: 2022-03-17

## 2022-03-09 RX ORDER — NALTREXONE HYDROCHLORIDE 50 MG/1
25 TABLET, FILM COATED ORAL DAILY
Refills: 0 | Status: DISCONTINUED | OUTPATIENT
Start: 2022-03-09 | End: 2022-03-10

## 2022-03-09 RX ORDER — DOXAZOSIN MESYLATE 4 MG
2 TABLET ORAL AT BEDTIME
Refills: 0 | Status: DISCONTINUED | OUTPATIENT
Start: 2022-03-09 | End: 2022-03-10

## 2022-03-09 RX ORDER — VENLAFAXINE HCL 75 MG
75 CAPSULE, EXT RELEASE 24 HR ORAL DAILY
Refills: 0 | Status: DISCONTINUED | OUTPATIENT
Start: 2022-03-09 | End: 2022-03-10

## 2022-03-09 RX ORDER — TRAZODONE HCL 50 MG
50 TABLET ORAL AT BEDTIME
Refills: 0 | Status: DISCONTINUED | OUTPATIENT
Start: 2022-03-09 | End: 2022-03-17

## 2022-03-09 RX ADMIN — Medication 75 MILLIGRAM(S): at 21:58

## 2022-03-09 RX ADMIN — Medication 2 MILLIGRAM(S): at 21:57

## 2022-03-09 NOTE — ED BEHAVIORAL HEALTH ASSESSMENT NOTE - NSBHROSSYSTEMS_PSY_ALL_CORE
Psychiatric apart from complaining of right forearm pain s/p suturing of laceration, he denied any headaches, no dizziness, no blurring of vision; no sorethroat; no cough, no fever. no chest pains, no SOB, no palpitations, no abdominal pains, no nausea/ vomiting/ diarrhea, no dysuria, no hesitancy, no arthralgia/ no pruritus. denied any muscle/ joint pains/Psychiatric

## 2022-03-09 NOTE — BH INPATIENT PSYCHIATRY ASSESSMENT NOTE - NSCOMMENTSUICRISKFACT_PSY_ALL_CORE
SA, chronic SI, substance use, PTSD, depression, anxiety, male gender, multiple hospitalizations, poor social support

## 2022-03-09 NOTE — BH INPATIENT PSYCHIATRY ASSESSMENT NOTE - NSBHCHARTREVIEWVS_PSY_A_CORE FT
Vital Signs Last 24 Hrs  T(C): 36.8 (03-09-22 @ 07:18), Max: 36.8 (03-08-22 @ 19:50)  T(F): 98.3 (03-09-22 @ 07:18), Max: 98.3 (03-09-22 @ 07:18)  HR: 97 (03-09-22 @ 07:18) (91 - 97)  BP: 130/65 (03-09-22 @ 07:18) (120/61 - 130/81)  BP(mean): --  RR: 15 (03-09-22 @ 07:18) (15 - 18)  SpO2: 97% (03-09-22 @ 07:18) (96% - 100%)     Vital Signs Last 24 Hrs  T(C): 36.8 (03-10-22 @ 06:14), Max: 36.9 (03-09-22 @ 19:20)  T(F): 98.2 (03-10-22 @ 06:14), Max: 98.4 (03-09-22 @ 19:20)  HR: --  BP: --  BP(mean): --  RR: 16 (03-10-22 @ 08:38) (16 - 16)  SpO2: --    Orthostatic VS  03-10-22 @ 08:38  Lying BP: --/-- HR: --  Sitting BP: 129/65 HR: 76  Standing BP: 128/65 HR: 104  Site: --  Mode: --  Orthostatic VS  03-09-22 @ 19:20  Lying BP: --/-- HR: --  Sitting BP: 138/80 HR: 59  Standing BP: 132/86 HR: 73  Site: --  Mode: --  Orthostatic VS  03-09-22 @ 13:15  Lying BP: 129/76 HR: 82  Sitting BP: --/-- HR: --  Standing BP: --/-- HR: --  Site: --  Mode: --

## 2022-03-09 NOTE — ED BEHAVIORAL HEALTH ASSESSMENT NOTE - MODIFICATIONS
I have personally seen and examined this patient with Dr SOURAV Dunn. I have fully participated in the care of this patient. I have made amendments to the documentation where appropriate and otherwise agree with the history, mental status/ physical exam, and plan as documented by Dr SOURAV Dunn

## 2022-03-09 NOTE — BH INPATIENT PSYCHIATRY ASSESSMENT NOTE - DESCRIPTION
Patient lives at home with father, uncle and grandmother. Completed up to high school, dismissed from community college for failing grades. Currently unemployed.

## 2022-03-09 NOTE — BH INPATIENT PSYCHIATRY ASSESSMENT NOTE - NSBHASSESSSUMMFT_PSY_ALL_CORE
22yo M, domiciled with father, high school graduate, unemployed, non-caregiver, PPH of depression, borderline personality disorder, alcohol abuse, 3 prior psych hospitalizations, most recently in 2/26/2021-3/28/2021 at [facility] for cutting in the setting of alcohol intoxication, currently in outpatient txt with therapist Dr. Beasley and psychiatrist Dr. Darren Hobson, hx NSSIB and SA via asphyxiation w/plastic bag and cutting, no hx of aggressive or violent behavior, currently binge drinking alcohol, no history of detox/rehab or withdrawal symptoms, no legal issues who was BIB self by referral from therapist after making suicidal statements over the phone and cutting forearm.     Upon assessment, the pt. presents with Sx in line with depression, active PTSD and anxiety, within the Dx of borderline personality disorder. The pt. denies current S/i/p. He denies ETOH withdrawal Sx; CIWA protocol ordered with Sx triggered Ativan. The pt. demonstrates some insight into his symptoms, however, appears to be minimizing their severity. He is otherwise agreeable to treatment including doxazosin for PTSD, Effexor for depression/anxiety and naltrexone for AUD. Pt. inquired about having his mother visit. He was advised to refrain from visitations with his mother temporarily as she appears to be a trigger. The pt. stated he is OK with her visiting despite the team's . Nursing was alerted to monitor the patient during and after visits.     Plan:  Psychiatric medications: Effexor XR 75mg, doxazosin 2mg   Substance: Naltrexone 25mg w/ plan for Vivitrol; also off-label for NSSIB  Observation: routine, pt. is not an acute risk of suicide; he denies active S/i/b  Legal: 9.39 emergency  Medical: Pt. has 4cm laceration to left forearm with 7 stitches; cleaned and dressed by nursing; wound care consult ordered  Sx reduction, medication management, safety planning, milieu therapy.  Dispo: pending

## 2022-03-09 NOTE — ED BEHAVIORAL HEALTH ASSESSMENT NOTE - DESCRIPTION
None Patient was found to have 4cm cut over right forearm. Laceration was irrigated and required 7 sutures. BAL found to be 303.      Patient has been isolative in the unit. Patient has been communicative of needs to staff. No episodes of aggression or agitation. No episodes of self-harm. No PRN's given. Patient lives at home with father, uncle and grandmother. Completed up to high school, dismissed from community college for failing grades. Currently unemployed. No safety concerns at home.

## 2022-03-09 NOTE — ED BEHAVIORAL HEALTH NOTE - BEHAVIORAL HEALTH NOTE
COVID Exposure Screen- Patient  1.	*Have you had a COVID-19 test in the last 90 days?  (  ) Yes   ( X ) No   (  ) Unknown- Reason: _____  IF YES PROCEED TO QUESTION #2. IF NO OR UNKNOWN, PLEASE SKIP TO QUESTION #3.  2.	Date of test(s) and result(s): ________  3.	*Have you tested positive for COVID-19 antibodies? (  ) Yes   ( X ) No   (  ) Unknown- Reason: _____  IF YES PROCEED TO QUESTION #4. IF NO or UNKNOWN, PLEASE SKIP TO QUESTION #5.  4.	Date of positive antibody test: ________  5.	*Have you received 2 doses of the COVID-19 vaccine? ( X ) Yes   (  ) No   (  ) Unknown- Reason: _____   IF YES PROCEED TO QUESTION #6. IF NO or UNKNOWN, PLEASE SKIP TO QUESTION #7.  6.	Date of second dose: ________ completed Pfizer series last 2/2022; no booster dose received   7.	*In the past 10 days, have you been around anyone with a positive COVID-19 test?* (  ) Yes   ( X ) No   (  ) Unknown- Reason: ____  IF YES PROCEED TO QUESTION #8. IF NO or UNKNOWN, PLEASE SKIP TO QUESTION #13.  8.	Were you within 6 feet of them for at least 15 minutes? (  ) Yes   (  ) No   (  ) Unknown- Reason: _____  9.	Have you provided care for them? (  ) Yes   (  ) No   (  ) Unknown- Reason: ______  10.	Have you had direct physical contact with them (touched, hugged, or kissed them)? (  ) Yes   (  ) No    (  ) Unknown- Reason: _____  11.	Have you shared eating or drinking utensils with them? (  ) Yes   (  ) No    (  ) Unknown- Reason: ____  12.	Have they sneezed, coughed, or somehow gotten respiratory droplets on you? (  ) Yes   (  ) No    (  ) Unknown- Reason: ______  13.	*Have you been out of [place] within the past 10 days?* (  ) Yes   ( X ) No   (  ) Unknown- Reason: _____  IF YES PLEASE ANSWER THE FOLLOWING QUESTIONS:  14.	Which state/country have you been to? ______  15.	Were you there over 24 hours? (  ) Yes   (  ) No    (  ) Unknown- Reason: ______  16.	Date of return to [facility]: ______

## 2022-03-09 NOTE — ED BEHAVIORAL HEALTH ASSESSMENT NOTE - RISK ASSESSMENT
Risk factors include: single, male, current SI/I/P, hopelessness/helplessness, recent suicide attempt, NSSIB, access to means, severe anxiety, insomnia, agitation, impulsivity, active psychosis, active mood episode, active substance use, acute psychosocial stressors, poor reactivity to stressors, difficulty expressing emotions, low frustration tolerance, experiencing homelessness, social isolation, noncompliant with treatment/not receiving treatment, , limited insight into symptoms, academic/occupational decline, absence of outpatient follow-up, poor social supports, recent inpatient discharge, recent loss, unable to care for self secondary to psychiatric illness.    Chronic risk factors for suicide include: h/o prior psychiatric admissions, diagnosis of MDD and borderline personality disorder, prior suicide attempts, h/o NSSIB, h/o trauma/abuse  Protective factors include: Young, healthy, identifies reasons for living, supportive friends    Pt with severely elevated risk not mitigated by protective factors; Would benefit from psychiatric admission. High Acute Suicide Risk

## 2022-03-09 NOTE — ED BEHAVIORAL HEALTH ASSESSMENT NOTE - CASE SUMMARY
21/M with hx of depression, PTSD and borderline PD; with prior psych admissions, hx of SA and cutting behavior, and alcohol abuse.  Presented to the ED BIB self as a referral from his therapist after making suicidal statements over the phone and subsequently, cutting forearm.     at this time, Pt's current presentation is similar to previous [facility] and [facility] admissions.  He endorses feeling depressed; appears dysphoric; with escalating alcohol intake, and increased self-harm frequency.  Pt is guarded, attempting to minimize symptoms as ploy to get discharged from the hospital.  The Pt is severely affectively dysregulated and remains unpredictable; whilst he does have some insight, his judgement is poor.  likely presentation is consequential of axis II pathology though a primary affective disorder cannot be totally discounted as helping propagate Pt's ongoing worsening symptoms.  at this time, the Pt is unable to partake towards safety planning. said symptoms have caused severe functional impairment.  Pt will need psych admission as mitigating strategy aimed at stabilization and ensuring safety as the Pt is at high risk of harm to self, considering intoxication, risky self-harm, and acute suicidality as per therapist collateral. Outpatient therapist and psychiatrist are in agreement with plan to admit psychiatrically for safety and stabilization for medication titration.  RECOMMENDATIONS:   1. reinitiate Effexor at 75mg daily. primary treatment team to titrate  2. Defer to treatment team to decide on need for dedicated mood stabilizer   3. PRN: Trazodone 50mg HS as off label for sleep disturbance  4. PRN: ativan 1mg PO/IM q6Hrs + benadryl 50mg PO/IM q6Hrs for agitation  5. symptom trigger CIWA protocol for alcohol use although patient denies regular use.  6. pain control and daily wound care  7. once medically cleared, facilitate transfer to [facility] on involuntary status

## 2022-03-09 NOTE — ED BEHAVIORAL HEALTH ASSESSMENT NOTE - SUMMARY
20yo M, domiciled with father, high school graduate, unemployed, non-caregiver, PPH of depression, borderline personality disorder, alcohol abuse, 3 prior psych hospitalizations, most recently in 2/26/2021-3/28/2021 at [facility] for cutting in the setting of alcohol intoxication, currently in outpatient txt with therapist Dr. Beasley and psychiatrist Dr. Darren Hobson, hx NSSIB and SA via asphyxiation w/plastic bag and cutting, no hx of aggressive or violent behavior, currently binge drinking alcohol, no history of detox/rehab or withdrawal symptoms, no legal issues who was BIB self by referral from therapist after making suicidal statements over the phone and cutting forearm.     Current presentation is similar to previous [facility] and [facility] admissions, with patient presenting as dysphoric with increased alcohol intake, increased self-harm frequency, and acutely elevated suicidality. On exam, patient is guarded, limiting information with stated desire to leave the hospital. Patient denies any episode of active SI or plan, although per therapist collateral, patient had plans to overdose on pill. Additionally, patient was found to have 4cm laceration on right forearm requiring 7 sutures due to previous self-harm and was also found to have BAL of 303. Patient denies any MDD symptoms, although patient reports chronic feelings of emptiness, depressed mood, and reports functional impairment, consistent with BPD. Based on presentation, patient is at high risk of harm to self, considering intoxication, risky self-harm, and acute suicidality as per therapist collateral. Outpatient therapist and psychiatrist in agreement with plan to admit psychiatrically for safety and stabilization for medication titration.    Plan:   - Admitting on 9.39 involuntary status. No need for CO  - Start home effexor 75mg QD; Defer to inpatient psychiatry to decide on need for additional mood stabilizing medication  - PRN's: Trazodone 50mg QHS PRN insomnia, ativan 1mg PO/IM PRN agitation, benadryl 50mg PO/IM PRN agitation  - Start WA protocol for alcohol use although patient denies regular use. 22yo M, domiciled with father, high school graduate, unemployed, non-caregiver, PPH of depression, borderline personality disorder, alcohol abuse, 3 prior psych hospitalizations, most recently in 2/26/2021-3/28/2021 at [facility] for cutting in the setting of alcohol intoxication, currently in outpatient txt with therapist Dr. Beasley and psychiatrist Dr. Darren Hobson, hx NSSIB and SA via asphyxiation w/plastic bag and cutting, no hx of aggressive or violent behavior, currently binge drinking alcohol, no history of detox/rehab or withdrawal symptoms, no legal issues who was BIB self by referral from therapist after making suicidal statements over the phone and cutting forearm.     Current presentation is similar to previous [facility] and [facility] admissions, with patient presenting as dysphoric with increased alcohol intake, increased self-harm frequency, and acutely elevated suicidality. On exam, patient is guarded, limiting information with stated desire to leave the hospital. Patient denies any episode of active SI or plan, although per therapist collateral, patient had plans to overdose on pill. Additionally, patient was found to have 4cm laceration on right forearm requiring 7 sutures due to previous self-harm and was also found to have BAL of 303. Patient denies any MDD symptoms, although patient reports chronic feelings of emptiness, depressed mood, and reports functional impairment, consistent with BPD. Based on presentation, patient is at high risk of harm to self, considering intoxication, risky self-harm, and acute suicidality as per therapist collateral. Outpatient therapist and psychiatrist in agreement with plan to admit psychiatrically for safety and stabilization for medication titration.    Plan:   - Admitting on 9.39 involuntary status. No need for CO  - Start home effexor 75mg QD; Defer to inpatient psychiatry to decide on need for additional mood stabilizing medication  - PRN's: Trazodone 50mg QHS PRN insomnia, ativan 1mg PO/IM PRN agitation, benadryl 50mg PO/IM PRN agitation  - Start symptom trigger CIWA protocol for alcohol use although patient denies regular use.

## 2022-03-09 NOTE — BH INPATIENT PSYCHIATRY ASSESSMENT NOTE - RISK ASSESSMENT
Risk factors include: single, male, current SI/I/P, hopelessness/helplessness, recent suicide attempt, NSSIB, access to means, severe anxiety, insomnia, agitation, impulsivity, active psychosis, active mood episode, active substance use, acute psychosocial stressors, poor reactivity to stressors, difficulty expressing emotions, low frustration tolerance, experiencing homelessness, social isolation, noncompliant with treatment/not receiving treatment, , limited insight into symptoms, academic/occupational decline, absence of outpatient follow-up, poor social supports, recent inpatient discharge, recent loss, unable to care for self secondary to psychiatric illness.    Chronic risk factors for suicide include: h/o prior psychiatric admissions, diagnosis of MDD and borderline personality disorder, prior suicide attempts, h/o NSSIB, h/o trauma/abuse  Protective factors include: Young, healthy, identifies reasons for living, supportive friends    Pt with severely elevated risk not mitigated by protective factors; Would benefit from psychiatric admission.

## 2022-03-09 NOTE — ED BEHAVIORAL HEALTH ASSESSMENT NOTE - NSBHSAALC_PSY_A_CORE FT
Last used last night - 1 bottle of wine and 2 four lokos. Patient has periods of binging but does not drink regularly.

## 2022-03-09 NOTE — BH INPATIENT PSYCHIATRY ASSESSMENT NOTE - CURRENT MEDICATION
MEDICATIONS  (STANDING):  doxazosin 2 milliGRAM(s) Oral at bedtime  naltrexone 25 milliGRAM(s) Oral daily  venlafaxine XR. 75 milliGRAM(s) Oral daily    MEDICATIONS  (PRN):  acetaminophen     Tablet .. 650 milliGRAM(s) Oral every 6 hours PRN Mild Pain (1 - 3), Moderate Pain (4 - 6), Severe Pain (7 - 10)  diphenhydrAMINE 50 milliGRAM(s) Oral every 6 hours PRN Rash and/or Itching  diphenhydrAMINE Injectable 50 milliGRAM(s) IV Push once PRN severe agitation  LORazepam     Tablet 2 milliGRAM(s) Oral every 2 hours PRN CIWA score increase by 2 points and current CIWA score GREATER THAN 9  LORazepam     Tablet 1 milliGRAM(s) Oral every 6 hours PRN Agitation  LORazepam   Injectable 1 milliGRAM(s) IntraMuscular Once PRN severe agitation  traZODone 50 milliGRAM(s) Oral at bedtime PRN insomnia

## 2022-03-09 NOTE — BH INPATIENT PSYCHIATRY ASSESSMENT NOTE - NSBHMETABOLIC_PSY_ALL_CORE_FT
BMI: BMI (kg/m2): 19.7 (03-08-22 @ 14:51)  HbA1c:   Glucose: POCT Blood Glucose.: 105 mg/dL (02-19-22 @ 11:40)    BP: 130/65 (03-09-22 @ 07:18) (120/61 - 137/82)  Lipid Panel:  BMI: BMI (kg/m2): 18.6 (03-09-22 @ 13:15)  HbA1c: A1C with Estimated Average Glucose Result: 5.4 % (03-10-22 @ 09:41)    Glucose: POCT Blood Glucose.: 105 mg/dL (02-19-22 @ 11:40)    BP: 130/65 (03-09-22 @ 07:18) (120/61 - 137/82)  Lipid Panel: Date/Time: 03-10-22 @ 09:41  Cholesterol, Serum: 210  Direct LDL: --  HDL Cholesterol, Serum: 73  Total Cholesterol/HDL Ration Measurement: --  Triglycerides, Serum: 97

## 2022-03-09 NOTE — ED BEHAVIORAL HEALTH NOTE - BEHAVIORAL HEALTH NOTE
Patient slept overnight.   No behavioral issues and did not require PRNS.     Vital Signs Last 24 Hrs  T(C): 36.8 (09 Mar 2022 07:18), Max: 36.8 (08 Mar 2022 19:50)  T(F): 98.3 (09 Mar 2022 07:18), Max: 98.3 (09 Mar 2022 07:18)  HR: 97 (09 Mar 2022 07:18) (91 - 111)  BP: 130/65 (09 Mar 2022 07:18) (120/61 - 137/82)  BP(mean): --  RR: 15 (09 Mar 2022 07:18) (15 - 18)  SpO2: 97% (09 Mar 2022 07:18) (96% - 100%)    Requested from ED MD - Dr. Rodriguez to put in one time dose of Effexor XR 75mg po.     Pt to be admitted to L4 on 9.39

## 2022-03-09 NOTE — BH PATIENT PROFILE - PRIMARY ROLES/RESPONSIBILITIES
Vaccine Information Statement(s) was given today. This has been reviewed, questions answered, and verbal consent given by Patient for injection(s) and administration of Influenza (Inactivated).    1. Does the patient have a moderate to severe fever?  No  2. Has the patient had a serious reaction to a flu shot before?   No  3. Has the patient ever had Guillian Hancock Syndrome within 6 weeks of a previous flu shot?  No  4. Does the patient have a serious allergy to eggs?  No  5. Is the patient less that 6 months of age?  No    Patient is eligible to receive the vaccine based on all questions being answered as 'No'.        Patient tolerated without incident. See immunization grid for documentation.     none
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
## 2022-03-09 NOTE — ED BEHAVIORAL HEALTH ASSESSMENT NOTE - OTHER PAST PSYCHIATRIC HISTORY (INCLUDE DETAILS REGARDING ONSET, COURSE OF ILLNESS, INPATIENT/OUTPATIENT TREATMENT)
3 prior inpatient hospitalizations  1 prior suicide attempt via asphyxiation  Current outpatient psychiatric treatment with Dr. Hobson  Current outpatient therapy with Dr. Beasley

## 2022-03-09 NOTE — ED BEHAVIORAL HEALTH ASSESSMENT NOTE - SUICIDAL BEHAVIOR DETAILS
Patient intoxicated and made statements to therapist with intent and plan to overdose on home medications. Patient also cut right forearm, requiring 7 stitches.

## 2022-03-09 NOTE — BH PATIENT PROFILE - NSSUBRIEFINTERVENTION_PSY_A_CORE
Alcohol or substance use can interact with existing medical conditions and/or medication i.e. (Hypertension, depression, anxiety, insomnia, injury, congestive heart failure, diabetes, breast cancer risk)

## 2022-03-09 NOTE — BH INPATIENT PSYCHIATRY ASSESSMENT NOTE - NSBHPSYCHMEDSHX_PSY_A_CORE
HOSPITALISTS HISTORY AND PHYSICAL    9/29/2021 3:45 PM    Patient Information:  Yehuda Jeong is a 78 y.o. female [id number]  PCP:  Rue Phalen, MD (Tel: [phone] )    Chief complaint:    Chief Complaint   Patient presents with    GI Bleeding     at adult  and they called and told her that she had clots, pt takes aspirin,depends saturated       History of Present Illness:  Shanita Hoyos is a 78 y.o. female who presented with GI bleed. Patient was at adult  when she noted some clots. Patient said noted some bright red blood and clots in the stool. On the depends. Patient does take aspirin. Denies any significant symptoms. A couple episodes today. No dyspnea no chest pain no history of GI bleed previously recently. Nothing that makes it better or worse. REVIEW OF SYSTEMS:   Constitutional: Negative for fever,chills or night sweats  ENT: Negative for rhinorrhea, epistaxis, hoarseness, sore throat. Respiratory: Negative for shortness of breath,wheezing  Cardiovascular: Negative for chest pain, palpitations   Gastrointestinal: Negative for nausea, vomiting, diarrhea  Genitourinary: Negative for polyuria, dysuria   Hematologic/Lymphatic: Negative for bleeding tendency, easy bruising  Musculoskeletal: Negative for myalgias and arthralgias  Neurologic: Negative for confusion,dysarthria. Skin: Negative for itching,rash, good capillary refill. Psychiatric: Negative for depression,anxiety, agitation. Endocrine: Negative for polydipsia,polyuria,heat /cold intolerance. Past Medical History:   has a past medical history of Chronic back pain, CKD (chronic kidney disease) stage 3, GFR 30-59 ml/min ([facility] [address] Dementia ([facility] [address] and Hypertension. Past Surgical History:   has a past surgical history that includes Toe Surgery; Hysterectomy, total abdominal; and hernia repair (Left, 4/2/2020).      Medications:  No current facility-administered PT SCHEDULED FOR CT RENAL PROTOCOL AT [facility], 4/9/19, AT 3:30 WITH ARRIVAL TIME OF 3:15 PM       NOT ABLE TO LEAVE MESSAGE ON CELL PHONE AND DOES NOT WANT CALLS AT HOME NUMBER  WILL KEEP CALLING UNTIL I CAN TALK TO PT RE THIS STUDY  HE DOES NOT NEED ORAL CONTRAST BUT SHOULD DO THE 3 HR OF CLEAR LIQUIDS ONLY BEFORE THE STUDY  WILL SEND TO MICHAEL JEREZ SCHEDULING THE CYSTO AFTER PT CONFIRMS THE TIME AND DATE ARE OKAY FOR HIM FOR CT SCAN  medications on file prior to encounter. Current Outpatient Medications on File Prior to Encounter   Medication Sig Dispense Refill    metoprolol succinate (TOPROL XL) 50 MG extended release tablet Take 1 tablet by mouth twice daily 180 tablet 5    denosumab (PROLIA) 60 MG/ML SOSY SC injection Inject 60 mg into the skin every 6 months      amLODIPine (NORVASC) 5 MG tablet       Melatonin 10 MG TABS Take by mouth every evening      losartan (COZAAR) 100 MG tablet TAKE ONE TABLET BY MOUTH ONCE DAILY 90 tablet 1    mirtazapine (REMERON) 7.5 MG tablet Take 7.5 mg by mouth every evening      donepezil (ARICEPT) 5 MG tablet Take 1 tablet by mouth nightly (Patient not taking: Reported on 4/12/2021) 90 tablet 1    alendronate (FOSAMAX) 70 MG tablet Take 70 mg by mouth every 7 days Takes on saturdays      ondansetron (ZOFRAN) 4 MG tablet Take 1 tablet by mouth every 8 hours as needed for Nausea or Vomiting (Patient not taking: Reported on 10/20/2020) 30 tablet 0    vitamin D (CHOLECALCIFEROL) 1000 UNIT TABS tablet Take 1,000 Units by mouth daily      aspirin 81 MG tablet Take 81 mg by mouth daily      Multiple Vitamin (MULTIVITAMINS PO) Take by mouth         Allergies:  No Known Allergies     Social History:   reports that she has never smoked. She has never used smokeless tobacco. She reports that she does not drink alcohol and does not use drugs. Family History:  family history includes Diabetes in her mother and sister. ,    Physical Exam:  BP (!) 194/91   Pulse 92   Temp 99.2 °F (37.3 °C) (Oral)   Resp 17   Wt 122 lb (55.3 kg)   SpO2 98%   BMI 20.94 kg/m²     General appearance:  Appears comfortable. Well nourished  Eyes: Sclera clear, pupils equal  ENT: Moist mucus membranes, no thrush. Trachea midline. Cardiovascular: Regular rhythm, normal S1, S2. No murmur, gallop, rub.  No edema in lower extremities  Respiratory: Clear to auscultation bilaterally, no wheeze, good inspiratory effort  Gastrointestinal: Abdomen soft, non-tender, not distended, normal bowel sounds  Musculoskeletal: No cyanosis in digits, neck supple  Neurology: Cranial nerves grossly intact. Alert and oriented in time, place and person. No speech or motor deficits  Psychiatry: Appropriate affect. Not agitated  Skin: Warm, dry, normal turgor, no rash    Labs:  CBC:   Lab Results   Component Value Date    WBC 7.2 09/29/2021    RBC 4.45 09/29/2021    HGB 12.1 09/29/2021    HCT 37.0 09/29/2021    MCV 83.3 09/29/2021    MCH 27.2 09/29/2021    MCHC 32.6 09/29/2021    RDW 16.3 09/29/2021     09/29/2021    MPV 8.5 09/29/2021     BMP:    Lab Results   Component Value Date     09/29/2021    K 4.6 09/29/2021     09/29/2021    CO2 23 09/29/2021    BUN 18 09/29/2021    CREATININE 0.7 09/29/2021    CALCIUM 9.1 09/29/2021    GFRAA >60 09/29/2021    LABGLOM >60 09/29/2021    GLUCOSE 99 09/29/2021       Chest Xray:   EKG:    I visualized CXR images and EKG strips     Discussed  with     Problem List  Active Problems:    GI bleed  Resolved Problems:    * No resolved hospital problems. *        Assessment/Plan:   Acute GI bleed  -This is lower diverticular bleed  -Hemoglobin is 12.1  -Patient hemodynamically stable currently. Will admit for further evaluation  -IV fluids.   Monitor H&H  -Clear liquid diet for now n.p.o. after midnight GI consult  -        Marck Perez MD    9/29/2021 3:45 PM yes...

## 2022-03-09 NOTE — ED BEHAVIORAL HEALTH ASSESSMENT NOTE - OTHER
Therapist Dr. Beasley guarded guarded, minimizing to temporarily board at the  ED as no beds available right forearm with bandage; no active bleeding noted distracted
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
## 2022-03-09 NOTE — ED BEHAVIORAL HEALTH ASSESSMENT NOTE - PAST PSYCHOTROPIC MEDICATION
Previously on lithium, abilify, zoloft, naltrexone. Lithium previously prescribed by inpatient psychiatry, then d/c'ed by outpatient psychiatrist due to low concern for bipolar disorder or psychosis.

## 2022-03-10 LAB
A1C WITH ESTIMATED AVERAGE GLUCOSE RESULT: 5.4 % — SIGNIFICANT CHANGE UP (ref 4–5.6)
CHOLEST SERPL-MCNC: 210 MG/DL — HIGH
ESTIMATED AVERAGE GLUCOSE: 108 — SIGNIFICANT CHANGE UP
HDLC SERPL-MCNC: 73 MG/DL — SIGNIFICANT CHANGE UP
LIPID PNL WITH DIRECT LDL SERPL: 118 MG/DL — HIGH
NON HDL CHOLESTEROL: 137 MG/DL — HIGH
TRIGL SERPL-MCNC: 97 MG/DL — SIGNIFICANT CHANGE UP

## 2022-03-10 PROCEDURE — 99232 SBSQ HOSP IP/OBS MODERATE 35: CPT

## 2022-03-10 RX ORDER — VENLAFAXINE HCL 75 MG
150 CAPSULE, EXT RELEASE 24 HR ORAL DAILY
Refills: 0 | Status: DISCONTINUED | OUTPATIENT
Start: 2022-03-11 | End: 2022-03-14

## 2022-03-10 RX ORDER — NALTREXONE HYDROCHLORIDE 50 MG/1
380 TABLET, FILM COATED ORAL ONCE
Refills: 0 | Status: COMPLETED | OUTPATIENT
Start: 2022-03-11 | End: 2022-03-11

## 2022-03-10 RX ORDER — DOXAZOSIN MESYLATE 4 MG
4 TABLET ORAL AT BEDTIME
Refills: 0 | Status: DISCONTINUED | OUTPATIENT
Start: 2022-03-10 | End: 2022-03-17

## 2022-03-10 RX ADMIN — Medication 4 MILLIGRAM(S): at 21:23

## 2022-03-10 RX ADMIN — Medication 75 MILLIGRAM(S): at 08:12

## 2022-03-10 RX ADMIN — NALTREXONE HYDROCHLORIDE 25 MILLIGRAM(S): 50 TABLET, FILM COATED ORAL at 08:12
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
## 2022-03-10 NOTE — BH SOCIAL WORK INITIAL PSYCHOSOCIAL EVALUATION - OTHER PAST PSYCHIATRIC HISTORY (INCLUDE DETAILS REGARDING ONSET, COURSE OF ILLNESS, INPATIENT/OUTPATIENT TREATMENT)
Pt is a 20yo M, domiciled with father, high school graduate, unemployed, non-caregiver, PPHx of depression, borderline personality disorder, alcohol abuse, 3 prior psych hospitalizations most recently in 2/26/2021-3/28/2021 at [facility] for cutting in the setting of alcohol intoxication. Pt currently in outpatient txt with therapist Dr. Beasley and psychiatrist Dr. Darren Hobson, hx NSSIB and SA via asphyxiation w/plastic bag and cutting, no hx of aggressive or violent behavior, currently binge drinking alcohol, no history of detox/rehab or withdrawal symptoms, no legal issues who was BIB self by referral from therapist after making suicidal statements over the phone and cutting forearm.

## 2022-03-10 NOTE — PSYCHIATRIC REHAB INITIAL EVALUATION - NSBHALCSUBTREAT_PSY_ALL_CORE
Pt is currently in treatment with therapist Dr. Katharina Beasley and psychiatrist Dr. Hobson./Outpatient clinic (specify)

## 2022-03-10 NOTE — BH INPATIENT PSYCHIATRY PROGRESS NOTE - NSBHCHARTREVIEWVS_PSY_A_CORE FT
Vital Signs Last 24 Hrs  T(C): 36.2 (03-10-22 @ 14:24), Max: 36.9 (03-09-22 @ 19:20)  T(F): 97.1 (03-10-22 @ 14:24), Max: 98.4 (03-09-22 @ 19:20)  HR: 82 (03-10-22 @ 13:05) (82 - 82)  BP: 130/86 (03-10-22 @ 13:05) (130/86 - 130/86)  BP(mean): --  RR: 16 (03-10-22 @ 13:05) (16 - 16)  SpO2: --    Orthostatic VS  03-10-22 @ 08:38  Lying BP: --/-- HR: --  Sitting BP: 129/65 HR: 76  Standing BP: 128/65 HR: 104  Site: --  Mode: --  Orthostatic VS  03-09-22 @ 19:20  Lying BP: --/-- HR: --  Sitting BP: 138/80 HR: 59  Standing BP: 132/86 HR: 73  Site: --  Mode: --  Orthostatic VS  03-09-22 @ 13:15  Lying BP: 129/76 HR: 82  Sitting BP: --/-- HR: --  Standing BP: --/-- HR: --  Site: --  Mode: --
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
## 2022-03-10 NOTE — BH INPATIENT PSYCHIATRY PROGRESS NOTE - NSBHFUPINTERVALHXFT_PSY_A_CORE
Followup for NSSIB and SI in the context of PTSD, depression and BPD. Chart reviewed and discussed with team. No events reported overnight. The pt. slept for most of the night, appetite is normal. CIWA score has been 0. The pt. reported feeling nauseas after taking bedtime medications but denied other withdrawal symptoms. He denied feeling nauseas today. He was seen in his room. He was sleeping but woke up to speak with the writer. Pt. stated that his mood is at "the baseline that I'm usually at when I'm not doing things to hurt myself." He also stated "I feel not terrible today." Pt. reports "I feel numb usually" with difficulty connecting to his emotions. Upon reflecting on the events that took place prior to admission, the pt stated "I just wish I went to sleep" as opposed to harming himself. The writer and pt. discussed what he did that was positive that night such as calling his therapist. The writer inquired about his interests and he stated he likes "poetry, writing, therapy and video games." He also expressed interests in returning to school to study psychology. He denied urges to self-harm, S/i/p, H/i/p, AH/VH and delusions. Vital signs are stable.

## 2022-03-10 NOTE — BH INPATIENT PSYCHIATRY PROGRESS NOTE - NSBHASSESSSUMMFT_PSY_ALL_CORE
22yo M, domiciled with father, high school graduate, unemployed, non-caregiver, PPH of depression, borderline personality disorder, alcohol abuse, 3 prior psych hospitalizations, most recently in 2/26/2021-3/28/2021 at [facility] for cutting in the setting of alcohol intoxication, currently in outpatient txt with therapist Dr. Beasley and psychiatrist Dr. Darren Hobson, hx NSSIB and SA via asphyxiation w/plastic bag and cutting, no hx of aggressive or violent behavior, currently binge drinking alcohol, no history of detox/rehab or withdrawal symptoms, no legal issues who was BIB self by referral from therapist after making suicidal statements over the phone and cutting forearm.     Upon assessment today, the pt. appears depressed but affect is slightly more reactive today. The pt. denied S/i/p or thoughts to self-harm. He is more communicative and willing to discuss contributing factors to his recent SIB and SI w/ plan. He is tolerating medications. CIWA score and he denies withdrawal Sx. The pt. is scheduled for Vivitrol tomorrow. Titrate Effexor and doxazosinl    Plan:  Psychiatric medications: Effexor XR 150mg, doxazosin 4mg   Substance: Naltrexone 25mg w/ plan for Vivitrol 3/11; also off-label for NSSIB  Observation: routine, pt. is not an acute risk of suicide; he denies active S/i/b  Legal: 9.39 emergency  Medical: Pt. has 4cm laceration to left forearm with 7 stitches; cleaned and dressed by nursing; wound care consult ordered  Sx reduction, medication management, safety planning, milieu therapy.  Dispo: pending

## 2022-03-11 PROCEDURE — 99232 SBSQ HOSP IP/OBS MODERATE 35: CPT

## 2022-03-11 PROCEDURE — 90832 PSYTX W PT 30 MINUTES: CPT

## 2022-03-11 RX ADMIN — NALTREXONE HYDROCHLORIDE 380 MILLIGRAM(S): 50 TABLET, FILM COATED ORAL at 18:58

## 2022-03-11 RX ADMIN — Medication 4 MILLIGRAM(S): at 20:40

## 2022-03-11 RX ADMIN — Medication 150 MILLIGRAM(S): at 09:58

## 2022-03-11 RX ADMIN — Medication 50 MILLIGRAM(S): at 01:48

## 2022-03-11 NOTE — BH INPATIENT PSYCHIATRY PROGRESS NOTE - NSBHASSESSSUMMFT_PSY_ALL_CORE
22yo M, domiciled with father, high school graduate, unemployed, non-caregiver, PPH of depression, borderline personality disorder, alcohol abuse, 3 prior psych hospitalizations, most recently in 2/26/2021-3/28/2021 at [facility] for cutting in the setting of alcohol intoxication, currently in outpatient txt with therapist Dr. Beasley and psychiatrist Dr. Darren Hobson, hx NSSIB and SA via asphyxiation w/plastic bag and cutting, no hx of aggressive or violent behavior, currently binge drinking alcohol, no history of detox/rehab or withdrawal symptoms, no legal issues who was BIB self by referral from therapist after making suicidal statements over the phone and cutting forearm.     Upon assessment today, the patient's affect and mood have improved. The pt. denied S/i/p or thoughts to self-harm. He is more communicative and willing to discuss contributing factors to his recent SIB and SI w/ plan. He is tolerating medications. CIWA score and he denies withdrawal Sx. The pt. is scheduled for Vivitrol today. Continue Effexor and doxazosin as ordered. Dressing changed today on right arm; nurse reported seeing only 5 stitches; appears 2 have fallen off. The pt. denies pain.     Plan:  Psychiatric medications: Effexor XR 150mg, doxazosin 4mg   Substance: Naltrexone 25mg w/ plan for Vivitrol 3/11; also off-label for NSSIB  Observation: routine, pt. is not an acute risk of suicide; he denies active S/i/b  Legal: 9.39 emergency  Medical: Pt. has 4cm laceration to left forearm with 7 stitches; cleaned and dressed by nursing; wound care consult ordered  Sx reduction, medication management, safety planning, milieu therapy.   Dispo: pending

## 2022-03-11 NOTE — BH INPATIENT PSYCHIATRY PROGRESS NOTE - NSBHFUPINTERVALHXFT_PSY_A_CORE
Followup for NSSIB and SI in the context of PTSD, depression and BPD. Chart reviewed and discussed with team. No events reported overnight. The pt. slept for most of the night, appetite is normal. CIWA score has been 0. The pt. reports "I'm doing better I guess." He denies urges to self-harm, S/i/p, H/i/p, AH/VH and delusions. The writer inquired whether he is attending groups. The pt. stated "It's a toss up if they're actually productive" due to other patients possibly being disruptive in the group. He reports he does attend if he believes he can tolerate it. He is medication adherent. No Se noted. The pt. gave verbal consent to speak with hs mother regarding medications and insurance only.

## 2022-03-11 NOTE — BH PSYCHOLOGY - CLINICIAN PSYCHOTHERAPY NOTE - NSBHPSYCHOLNARRATIVE_PSY_A_CORE FT
Writer and PT wore masks due to COVID19 precautions. Pt was adequately groomed, dressed in hospital gowns. The Pt reported mood as ok, demonstrated constricted affect. Pt's thought process was linear, speech was brief and quiet. Pt denied current suicidal ideation, plan or intent. Pt denied current urges to self injure. Pt did not endorse homicidal ideation. Patient was oriented to person, place and time. Pt demonstrated a limited ability to engage in self reflection.     Pt reported that he was recently triggered by a conversation with his mother. Pt was vague regarding the content of the conversation. Pt reported that this conversation worsened his mood. He reported engaging in self cutting while drinking. He stated that since he cut deeply he started thinking about suicide. Pt reported he called his outpatient therapist at that point because he wanted help. Pt stated usually his self injury is not with the intention to kill himself. The pt discussed the role of alcohol in the situation. Pt believes he would not have felt suicidal if he was not intoxicated. Pt is open to substance use treatment. Pt discussed his tendency to detach from his emotions as a coping tool. Explored ways this method of coping impacts him.

## 2022-03-11 NOTE — BH TREATMENT PLAN - NSTXSUICIDINTERPR_PSY_ALL_CORE
Pt would benefit from identifying 2-3 coping skills to manage symptoms by day seven. Psychiatric rehabilitation staff will encourage pt to attend daily symptom management group and engage in individual therapy session to achieve his goal.

## 2022-03-11 NOTE — BH INPATIENT PSYCHIATRY PROGRESS NOTE - NSBHCHARTREVIEWVS_PSY_A_CORE FT
Vital Signs Last 24 Hrs  T(C): 36.6 (03-11-22 @ 15:24), Max: 36.6 (03-10-22 @ 19:45)  T(F): 97.8 (03-11-22 @ 15:24), Max: 97.8 (03-10-22 @ 19:45)  HR: 90 (03-11-22 @ 09:04) (90 - 90)  BP: 107/70 (03-11-22 @ 09:04) (107/70 - 107/70)  BP(mean): --  RR: --  SpO2: --    Orthostatic VS  03-10-22 @ 19:45  Lying BP: --/-- HR: --  Sitting BP: 142/85 HR: 62  Standing BP: 148/84 HR: 85  Site: --  Mode: --  Orthostatic VS  03-10-22 @ 08:38  Lying BP: --/-- HR: --  Sitting BP: 129/65 HR: 76  Standing BP: 128/65 HR: 104  Site: --  Mode: --  Orthostatic VS  03-09-22 @ 19:20  Lying BP: --/-- HR: --  Sitting BP: 138/80 HR: 59  Standing BP: 132/86 HR: 73  Site: --  Mode: --

## 2022-03-11 NOTE — BH PSYCHOLOGY - CLINICIAN PSYCHOTHERAPY NOTE - NSBHPSYCHOLINT_PSY_A_CORE
Cognitive/behavioral therapy/Interpersonal Social Rhythm Therapy (IPSRT)/Supported coping skills/Supportive therapy/other...

## 2022-03-12 PROCEDURE — 99232 SBSQ HOSP IP/OBS MODERATE 35: CPT

## 2022-03-12 RX ADMIN — Medication 1 MILLIGRAM(S): at 02:18

## 2022-03-12 RX ADMIN — Medication 4 MILLIGRAM(S): at 20:29

## 2022-03-12 RX ADMIN — Medication 50 MILLIGRAM(S): at 23:39

## 2022-03-12 RX ADMIN — Medication 150 MILLIGRAM(S): at 09:40

## 2022-03-12 RX ADMIN — Medication 1 MILLIGRAM(S): at 23:40

## 2022-03-12 NOTE — BH INPATIENT PSYCHIATRY PROGRESS NOTE - NSBHCHARTREVIEWVS_PSY_A_CORE FT
Vital Signs Last 24 Hrs  T(C): 35.9 (03-12-22 @ 05:55), Max: 36.6 (03-11-22 @ 15:24)  T(F): 96.6 (03-12-22 @ 05:55), Max: 97.8 (03-11-22 @ 15:24)  HR: 90 (03-11-22 @ 09:04) (90 - 90)  BP: 107/70 (03-11-22 @ 09:04) (107/70 - 107/70)  BP(mean): --  RR: --  SpO2: --    Orthostatic VS  03-12-22 @ 08:30  Lying BP: --/-- HR: --  Sitting BP: 120/72 HR: 85  Standing BP: 113/63 HR: 100  Site: upper left arm  Mode: electronic  Orthostatic VS  03-11-22 @ 20:28  Lying BP: --/-- HR: --  Sitting BP: 136/79 HR: 68  Standing BP: 131/83 HR: 91  Site: upper left arm  Mode: electronic  Orthostatic VS  03-10-22 @ 19:45  Lying BP: --/-- HR: --  Sitting BP: 142/85 HR: 62  Standing BP: 148/84 HR: 85  Site: --  Mode: --

## 2022-03-12 NOTE — BH INPATIENT PSYCHIATRY PROGRESS NOTE - NSBHFUPINTERVALHXFT_PSY_A_CORE
Patient is followed up for depression and SI, recent SIB, and drug use.  Chart, medications and labs reviewed, no acute findings.  Patient is discussed with nursing staff. No interval events.  Per nursing report patient remains compliant with all standing medications and tolerating well. Discussed receiving Vivitrol yesterday, no SE reported.  Patient remains in good behavioral control, there has been no aggression, no prns for aggression. No mention of sleep or appetite concerns   Patient is interviewed at bedside. He is calm, minimally engaged but cooperative.  Patient continues to be depressed reports decreased preoccupation of suicide. Patient describes mood as “I’m okay I guess.” Reports feeling depressed, downcast/sad affect.  Patient’s facial expression, demeanor/posture/attitude during interview convey an underlying depressed/hopeless mood.  Denies SI/SIB/HI, no plan or intent. Denies AVH, paranoia or delusions. No acute medical issues. Prior to admission pt engaged in SIB, cut over right forearm required 7 sutures. No signs of infection. Also presents with multiple healed cuts BL forearm. Patient offers no complaints.  Continue to monitor and provide therapeutic support.

## 2022-03-12 NOTE — BH INPATIENT PSYCHIATRY PROGRESS NOTE - NSBHASSESSSUMMFT_PSY_ALL_CORE
20yo M, domiciled with father, high school graduate, unemployed, non-caregiver, PPH of depression, borderline personality disorder, alcohol abuse, 3 prior psych hospitalizations, most recently in 2/26/2021-3/28/2021 at [facility] for cutting in the setting of alcohol intoxication, currently in outpatient txt with therapist Dr. Beasley and psychiatrist Dr. Darren Hobson, hx NSSIB and SA via asphyxiation w/plastic bag and cutting, no hx of aggressive or violent behavior, currently binge drinking alcohol, no history of detox/rehab or withdrawal symptoms, no legal issues who was BIB self by referral from therapist after making suicidal statements over the phone and cutting forearm.     Upon assessment today, the patient's affect and mood have improved. The pt. denied S/i/p or thoughts to self-harm. He is more communicative and willing to discuss contributing factors to his recent SIB and SI w/ plan. He is tolerating medications. CIWA score and he denies withdrawal Sx. The pt. is scheduled for Vivitrol today. Continue Effexor and doxazosin as ordered. Dressing changed today on right arm; nurse reported seeing only 5 stitches; appears 2 have fallen off. The pt. denies pain.     Plan:  Psychiatric medications: Effexor XR 150mg, doxazosin 4mg   Substance: Naltrexone 25mg w/ plan for Vivitrol 3/11; also off-label for NSSIB  Observation: routine, pt. is not an acute risk of suicide; he denies active S/i/b  Legal: 9.39 emergency  Medical: Pt. has 4cm laceration to left forearm with 7 stitches; cleaned and dressed by nursing; wound care consult ordered  Sx reduction, medication management, safety planning, milieu therapy.   Dispo: pending

## 2022-03-13 RX ADMIN — Medication 50 MILLIGRAM(S): at 23:19

## 2022-03-13 RX ADMIN — Medication 150 MILLIGRAM(S): at 09:04

## 2022-03-13 RX ADMIN — Medication 1 MILLIGRAM(S): at 23:20

## 2022-03-13 RX ADMIN — Medication 4 MILLIGRAM(S): at 21:17

## 2022-03-13 NOTE — BH INPATIENT PSYCHIATRY PROGRESS NOTE - NSBHCHARTREVIEWVS_PSY_A_CORE FT
Vital Signs Last 24 Hrs  T(C): 35.9 (03-13-22 @ 05:43), Max: 36.2 (03-12-22 @ 18:44)  T(F): 96.7 (03-13-22 @ 05:43), Max: 97.2 (03-12-22 @ 18:44)  HR: --  BP: --  BP(mean): --  RR: --  SpO2: --    Orthostatic VS  03-13-22 @ 08:37  Lying BP: --/-- HR: --  Sitting BP: 126/72 HR: 94  Standing BP: 111/61 HR: 110  Site: --  Mode: electronic  Orthostatic VS  03-12-22 @ 18:44  Lying BP: --/-- HR: --  Sitting BP: 140/86 HR: 93  Standing BP: 130/81 HR: 81  Site: --  Mode: --  Orthostatic VS  03-12-22 @ 08:30  Lying BP: --/-- HR: --  Sitting BP: 120/72 HR: 85  Standing BP: 113/63 HR: 100  Site: upper left arm  Mode: electronic  Orthostatic VS  03-11-22 @ 20:28  Lying BP: --/-- HR: --  Sitting BP: 136/79 HR: 68  Standing BP: 131/83 HR: 91  Site: upper left arm  Mode: electronic

## 2022-03-13 NOTE — BH INPATIENT PSYCHIATRY PROGRESS NOTE - NSBHFUPINTERVALHXFT_PSY_A_CORE
Patient is followed up for depression and SI, recent SIB, and drug use.  Chart, medications and labs reviewed, no acute findings.  Patient is discussed with nursing staff. No interval events.  Patient remains compliant with all standing medications and tolerating well. No SE from Vivitrol injection. Patient remains in good behavioral control, there has been no aggression, no prns for aggression. Patient reports improved sleep, no appetite concerns.    Patient is interviewed at bedside. He is calm, cooperative.  Patient describes mood as “Neutral.” Reports feeling slightly better than in prior days. Denies SI/SIB/HI, no plan or intent. Denies AVH, paranoia or delusions. No acute medical issues. Prior to admission pt engaged in SIB, cut over right forearm required 7 sutures. No signs of infection appears clean, dry, and intact. Also presents with multiple healed cuts BL forearm. Patient offers no complaints.  Continue to monitor and provide therapeutic support.

## 2022-03-13 NOTE — BH INPATIENT PSYCHIATRY PROGRESS NOTE - NSBHASSESSSUMMFT_PSY_ALL_CORE
22yo M, domiciled with father, high school graduate, unemployed, non-caregiver, PPH of depression, borderline personality disorder, alcohol abuse, 3 prior psych hospitalizations, most recently in 2/26/2021-3/28/2021 at [facility] for cutting in the setting of alcohol intoxication, currently in outpatient txt with therapist Dr. Beasley and psychiatrist Dr. Darren Hobson, hx NSSIB and SA via asphyxiation w/plastic bag and cutting, no hx of aggressive or violent behavior, currently binge drinking alcohol, no history of detox/rehab or withdrawal symptoms, no legal issues who was BIB self by referral from therapist after making suicidal statements over the phone and cutting forearm.     Upon assessment today, the patient's affect and mood have improved. The pt. denied S/i/p or thoughts to self-harm. He is more communicative and willing to discuss contributing factors to his recent SIB and SI w/ plan. He is tolerating medications. CIWA score and he denies withdrawal Sx. The pt. is scheduled for Vivitrol today. Continue Effexor and doxazosin as ordered. Dressing changed today on right arm; nurse reported seeing only 5 stitches; appears 2 have fallen off. The pt. denies pain.     Plan:  Psychiatric medications: Effexor XR 150mg, doxazosin 4mg   Substance: Naltrexone 25mg w/ plan for Vivitrol 3/11; also off-label for NSSIB  Observation: routine, pt. is not an acute risk of suicide; he denies active S/i/b  Legal: 9.39 emergency  Medical: Pt. has 4cm laceration to left forearm with 7 stitches; cleaned and dressed by nursing; wound care consult ordered  Sx reduction, medication management, safety planning, milieu therapy.   Dispo: pending

## 2022-03-14 LAB
ALBUMIN SERPL ELPH-MCNC: 4.6 G/DL — SIGNIFICANT CHANGE UP (ref 3.3–5)
ALP SERPL-CCNC: 48 U/L — SIGNIFICANT CHANGE UP (ref 40–120)
ALT FLD-CCNC: 14 U/L — SIGNIFICANT CHANGE UP (ref 4–41)
ANION GAP SERPL CALC-SCNC: 12 MMOL/L — SIGNIFICANT CHANGE UP (ref 7–14)
AST SERPL-CCNC: 19 U/L — SIGNIFICANT CHANGE UP (ref 4–40)
BILIRUB SERPL-MCNC: 0.6 MG/DL — SIGNIFICANT CHANGE UP (ref 0.2–1.2)
BUN SERPL-MCNC: 8 MG/DL — SIGNIFICANT CHANGE UP (ref 7–23)
CALCIUM SERPL-MCNC: 9.7 MG/DL — SIGNIFICANT CHANGE UP (ref 8.4–10.5)
CHLORIDE SERPL-SCNC: 97 MMOL/L — LOW (ref 98–107)
CO2 SERPL-SCNC: 27 MMOL/L — SIGNIFICANT CHANGE UP (ref 22–31)
CREAT SERPL-MCNC: 1.2 MG/DL — SIGNIFICANT CHANGE UP (ref 0.5–1.3)
EGFR: 87 ML/MIN/1.73M2 — SIGNIFICANT CHANGE UP
GLUCOSE SERPL-MCNC: 91 MG/DL — SIGNIFICANT CHANGE UP (ref 70–99)
POTASSIUM SERPL-MCNC: 4.2 MMOL/L — SIGNIFICANT CHANGE UP (ref 3.5–5.3)
POTASSIUM SERPL-SCNC: 4.2 MMOL/L — SIGNIFICANT CHANGE UP (ref 3.5–5.3)
PROT SERPL-MCNC: 7.4 G/DL — SIGNIFICANT CHANGE UP (ref 6–8.3)
SODIUM SERPL-SCNC: 136 MMOL/L — SIGNIFICANT CHANGE UP (ref 135–145)

## 2022-03-14 PROCEDURE — 99232 SBSQ HOSP IP/OBS MODERATE 35: CPT

## 2022-03-14 RX ORDER — VENLAFAXINE HCL 75 MG
187.5 CAPSULE, EXT RELEASE 24 HR ORAL DAILY
Refills: 0 | Status: DISCONTINUED | OUTPATIENT
Start: 2022-03-14 | End: 2022-03-17

## 2022-03-14 RX ADMIN — Medication 150 MILLIGRAM(S): at 10:05

## 2022-03-14 RX ADMIN — Medication 50 MILLIGRAM(S): at 21:37

## 2022-03-14 RX ADMIN — Medication 4 MILLIGRAM(S): at 21:37

## 2022-03-14 NOTE — BH INPATIENT PSYCHIATRY PROGRESS NOTE - NSBHCHARTREVIEWVS_PSY_A_CORE FT
Vital Signs Last 24 Hrs  T(C): 36.1 (03-16-22 @ 05:54), Max: 36.7 (03-15-22 @ 10:16)  T(F): 97 (03-16-22 @ 05:54), Max: 98 (03-15-22 @ 10:16)  HR: --  BP: --  BP(mean): --  RR: 18 (03-15-22 @ 10:16) (18 - 18)  SpO2: --    Orthostatic VS  03-16-22 @ 08:16  Lying BP: --/-- HR: --  Sitting BP: 107/60 HR: 80  Standing BP: 97/55 HR: 97  Site: upper left arm  Mode: electronic  Orthostatic VS  03-15-22 @ 20:40  Lying BP: --/-- HR: --  Sitting BP: 150/92 HR: 83  Standing BP: 142/93 HR: 100  Site: --  Mode: --  Orthostatic VS  03-15-22 @ 10:16  Lying BP: --/-- HR: --  Sitting BP: 112/67 HR: 84  Standing BP: 113/76 HR: 88  Site: --  Mode: --

## 2022-03-14 NOTE — BH INPATIENT PSYCHIATRY PROGRESS NOTE - NSBHFUPINTERVALHXFT_PSY_A_CORE
Patient is followed up for depression and SI, recent SIB, and drug use.  Chart, medications and labs reviewed, no acute findings.  Patient is discussed with nursing staff. No interval events.  Patient remains compliant with all standing medications and tolerating well. No SE from Vivitrol injection. Patient remains in good behavioral control, there has been no aggression, no prns for aggression. Patient reports improved sleep, no appetite concerns.   Patient was interviewed by the writer and medical student. He reports his mood has improved overall. He did express concern that continued improvement would be stalled if Effexor is not titrated to a higher dose. The pt. was educated on the pharmacology of the medication and he appeared satisfied. He denied AH/VH, S/i/p, H/i/p, including urges to self-harm. He is more discharge focused today but agreeable to the team's plan. Vital signs stable.

## 2022-03-14 NOTE — BH INPATIENT PSYCHIATRY PROGRESS NOTE - NSBHASSESSSUMMFT_PSY_ALL_CORE
20yo M, domiciled with father, high school graduate, unemployed, non-caregiver, PPH of depression, borderline personality disorder, alcohol abuse, 3 prior psych hospitalizations, most recently in 2/26/2021-3/28/2021 at [facility] for cutting in the setting of alcohol intoxication, currently in outpatient txt with therapist Dr. Beasley and psychiatrist Dr. Darren Hobson, hx NSSIB and SA via asphyxiation w/plastic bag and cutting, no hx of aggressive or violent behavior, currently binge drinking alcohol, no history of detox/rehab or withdrawal symptoms, no legal issues who was BIB self by referral from therapist after making suicidal statements over the phone and cutting forearm.     Upon assessment today, the patient's affect and mood have improved. The pt. denied S/i/p or thoughts to self-harm. He is more communicative and willing to discuss contributing factors to his recent SIB and SI w/ plan. He is tolerating medications. CIWA discontinued; denies withdrawal Sx. Titrate Effexor. Dressing changed today on right arm; nurse reported seeing only 5 stitches; appears 2 have fallen off. The pt. denies pain.     Plan:  Psychiatric medications: Effexor .5mg, doxazosin 4mg   Substance: Naltrexone 25mg w/ plan for Vivitrol 3/11; also off-label for NSSIB  Observation: routine, pt. is not an acute risk of suicide; he denies active S/i/b  Legal: 9.39 emergency  Medical: Pt. has 4cm laceration to left forearm with 7 stitches; cleaned and dressed by nursing; wound care consult ordered  Sx reduction, medication management, safety planning, milieu therapy.   Dispo: pending

## 2022-03-15 PROCEDURE — 99232 SBSQ HOSP IP/OBS MODERATE 35: CPT

## 2022-03-15 RX ADMIN — Medication 50 MILLIGRAM(S): at 21:28

## 2022-03-15 RX ADMIN — Medication 4 MILLIGRAM(S): at 21:28

## 2022-03-15 RX ADMIN — Medication 1 MILLIGRAM(S): at 00:12

## 2022-03-15 RX ADMIN — Medication 50 MILLIGRAM(S): at 23:38

## 2022-03-15 RX ADMIN — Medication 187.5 MILLIGRAM(S): at 10:33

## 2022-03-15 RX ADMIN — Medication 1 MILLIGRAM(S): at 21:28

## 2022-03-15 NOTE — BH INPATIENT PSYCHIATRY PROGRESS NOTE - NSBHASSESSSUMMFT_PSY_ALL_CORE
22yo M, domiciled with father, high school graduate, unemployed, non-caregiver, PPH of depression, borderline personality disorder, alcohol abuse, 3 prior psych hospitalizations, most recently in 2/26/2021-3/28/2021 at [facility] for cutting in the setting of alcohol intoxication, currently in outpatient txt with therapist Dr. Beasley and psychiatrist Dr. Darren Hobson, hx NSSIB and SA via asphyxiation w/plastic bag and cutting, no hx of aggressive or violent behavior, currently binge drinking alcohol, no history of detox/rehab or withdrawal symptoms, no legal issues who was BIB self by referral from therapist after making suicidal statements over the phone and cutting forearm.     Upon assessment today, the patient's affect and mood have improved. The pt. denied S/i/p or thoughts to self-harm. He is more communicative and willing to discuss contributing factors to his recent SIB and SI w/ plan. He is tolerating medications. CIWA score and he denies withdrawal Sx. The pt. is scheduled for Vivitrol today. Continue Effexor and doxazosin as ordered. Dressing changed today on right arm; nurse reported seeing only 5 stitches; appears 2 have fallen off. The pt. denies pain.     Plan:  Psychiatric medications: Effexor XR 150mg, doxazosin 4mg   Substance: Naltrexone 25mg w/ plan for Vivitrol 3/11; also off-label for NSSIB  Observation: routine, pt. is not an acute risk of suicide; he denies active S/i/b  Legal: 9.39 emergency  Medical: Pt. has 4cm laceration to left forearm with 7 stitches; cleaned and dressed by nursing; wound care consult ordered  Sx reduction, medication management, safety planning, milieu therapy.   Dispo: pending 20yo M, domiciled with father, high school graduate, unemployed, non-caregiver, PPH of depression, borderline personality disorder, alcohol abuse, 3 prior psych hospitalizations, most recently in 2/26/2021-3/28/2021 at [facility] for cutting in the setting of alcohol intoxication, currently in outpatient txt with therapist Dr. Beasley and psychiatrist Dr. Darren Hobson, hx NSSIB and SA via asphyxiation w/plastic bag and cutting, no hx of aggressive or violent behavior, currently binge drinking alcohol, no history of detox/rehab or withdrawal symptoms, no legal issues who was BIB self by referral from therapist after making suicidal statements over the phone and cutting forearm.     Upon assessment today, the patient's affect and mood are stable. The pt. denied S/i/p or thoughts to self-harm. He is more communicative and willing to discuss contributing factors to his recent SIB and SI w/ plan. He is tolerating medications. CIWA score and he denies withdrawal Sx. The pt. stated he prefer his mother not visit for the remainder of his hospitalization. Continue Effexor and doxazosin as ordered. All stitches to right arm have fallen off. The pt. denies pain to the site, wounds edges are approximated, there is no redness, bleeding. Open to air at this time.    Plan:  Psychiatric medications: Effexor .5mg, doxazosin 4mg   Substance: Naltrexone 25mg w/ plan for Vivitrol 3/11; also off-label for NSSIB  Observation: routine, pt. is not an acute risk of suicide; he denies active S/i/b  Legal: 9.39 emergency  Medical: Pt. has 4cm laceration to right forearm; stitches have fallen off  Sx reduction, medication management, safety planning, milieu therapy.   Dispo: D/C tomorrow to [facility]

## 2022-03-15 NOTE — BH INPATIENT PSYCHIATRY PROGRESS NOTE - NSBHFUPINTERVALHXFT_PSY_A_CORE
Patient is followed up for depression and SI, recent SIB, and drug use.  Chart, medications and labs reviewed, no acute findings.  Patient is discussed with nursing staff. No interval events.  Patient remains compliant with all standing medications and tolerating well. No SE from Vivitrol injection. Patient remains in good behavioral control, there has been no aggression, no prns for aggression. Patient reports improved sleep, no appetite concerns.    Patient was seen by the writer and medical student. He reports that his mood is "better" and continues to improve. The pt. expressed concern that if Effexor is not titrated to a high dose, that he will not continue to feel the improvement.  Patient is followed up for depression and SI, recent SIB, and drug use.  Chart, medications and labs reviewed, no acute findings.  Patient is discussed with nursing staff. Yesterday there was incident on the unit involving the patient's mother during visiting hours. Security was called to escort her off the unit.  Patient remains compliant with all standing medications and tolerating well. No SE from Vivitrol injection. Patient remains in good behavioral control, there has been no aggression, no prns for aggression. Patient reports improved sleep, no appetite concerns.   Patient was seen in his room. He reports "neutral" mood, denied S/i/p and urges to self-harm. The writer inquired about the incident that occurred last night and how this has affected him. The pt. stated he was "embarrassed" and apologized to the staff. The pt. stated that prefers that his mother did not visit for the remainder of the time he is hospitalized. He prefers that his brother visit him instead. He and the writer discussed how he experiences "anticipation" of potentially distressing conversations or events when interacting with his mother at times. The writer discussed how to address this moving forward with his mother and therapists. He otherwise denies AH/VH, H/i/p, delusions. Vital signs are stable.

## 2022-03-15 NOTE — BH INPATIENT PSYCHIATRY PROGRESS NOTE - NSBHCHARTREVIEWVS_PSY_A_CORE FT
Vital Signs Last 24 Hrs  T(C): 36.7 (03-15-22 @ 20:40), Max: 36.7 (03-15-22 @ 10:16)  T(F): 98 (03-15-22 @ 20:40), Max: 98 (03-15-22 @ 10:16)  HR: --  BP: --  BP(mean): --  RR: 18 (03-15-22 @ 10:16) (18 - 18)  SpO2: --    Orthostatic VS  03-15-22 @ 20:40  Lying BP: --/-- HR: --  Sitting BP: 150/92 HR: 83  Standing BP: 142/93 HR: 100  Site: --  Mode: --  Orthostatic VS  03-15-22 @ 10:16  Lying BP: --/-- HR: --  Sitting BP: 112/67 HR: 84  Standing BP: 113/76 HR: 88  Site: --  Mode: --  Orthostatic VS  03-14-22 @ 08:06  Lying BP: --/-- HR: --  Sitting BP: 108/72 HR: 84  Standing BP: 112/65 HR: 99  Site: upper left arm  Mode: electronic   Vital Signs Last 24 Hrs  T(C): 36.1 (03-16-22 @ 05:54), Max: 36.7 (03-15-22 @ 10:16)  T(F): 97 (03-16-22 @ 05:54), Max: 98 (03-15-22 @ 10:16)  HR: --  BP: --  BP(mean): --  RR: 18 (03-15-22 @ 10:16) (18 - 18)  SpO2: --    Orthostatic VS  03-16-22 @ 08:16  Lying BP: --/-- HR: --  Sitting BP: 107/60 HR: 80  Standing BP: 97/55 HR: 97  Site: upper left arm  Mode: electronic  Orthostatic VS  03-15-22 @ 20:40  Lying BP: --/-- HR: --  Sitting BP: 150/92 HR: 83  Standing BP: 142/93 HR: 100  Site: --  Mode: --  Orthostatic VS  03-15-22 @ 10:16  Lying BP: --/-- HR: --  Sitting BP: 112/67 HR: 84  Standing BP: 113/76 HR: 88  Site: --  Mode: --

## 2022-03-16 PROCEDURE — 99232 SBSQ HOSP IP/OBS MODERATE 35: CPT

## 2022-03-16 RX ORDER — VENLAFAXINE HCL 75 MG
1 CAPSULE, EXT RELEASE 24 HR ORAL
Qty: 14 | Refills: 0
Start: 2022-03-16 | End: 2022-03-29

## 2022-03-16 RX ORDER — DOXAZOSIN MESYLATE 4 MG
1 TABLET ORAL
Qty: 14 | Refills: 0
Start: 2022-03-16 | End: 2022-03-29

## 2022-03-16 RX ORDER — TRAZODONE HCL 50 MG
1 TABLET ORAL
Qty: 14 | Refills: 0
Start: 2022-03-16 | End: 2022-03-29

## 2022-03-16 RX ADMIN — Medication 187.5 MILLIGRAM(S): at 08:50

## 2022-03-16 RX ADMIN — Medication 50 MILLIGRAM(S): at 22:27

## 2022-03-16 RX ADMIN — Medication 1 MILLIGRAM(S): at 12:17

## 2022-03-16 RX ADMIN — Medication 4 MILLIGRAM(S): at 21:08

## 2022-03-16 RX ADMIN — Medication 1 MILLIGRAM(S): at 22:26

## 2022-03-16 NOTE — BH INPATIENT PSYCHIATRY PROGRESS NOTE - NSBHFUPINTERVALHXFT_PSY_A_CORE
Patient is followed up for depression and SI, recent SIB, and drug use.  Chart, medications and labs reviewed, no acute findings.  Patient is discussed with nursing staff. Yesterday there was incident on the unit involving the patient's mother during visiting hours. Security was called to escort her off the unit.  Patient remains compliant with all standing medications and tolerating well. No SE from Vivitrol injection. Patient remains in good behavioral control, there has been no aggression, no prns for aggression. Patient reports improved sleep, no appetite concerns.   Patient was seen in the day room, sitting with peers. He reports his mood was affected by the incident that occurred during visiting hours but is overall stable. He stated that his mother "found out" that we he was embarrassed and apologized to staff. The writer assured the patient that his mother was notified that visiting would be restricted based on the team's assessment of his response to the incident. The pt. seemed reassured but did express "it makes me anxious to leave" anticipating interactions with his mother. He otherwise denies AH/VH, H/i/p, delusions and reports he feels ready for discharge. Vital signs are stable.

## 2022-03-16 NOTE — BH INPATIENT PSYCHIATRY PROGRESS NOTE - NSBHCHARTREVIEWVS_PSY_A_CORE FT
Vital Signs Last 24 Hrs  T(C): 36.1 (03-16-22 @ 05:54), Max: 36.7 (03-15-22 @ 20:40)  T(F): 97 (03-16-22 @ 05:54), Max: 98 (03-15-22 @ 20:40)  HR: --  BP: --  BP(mean): --  RR: --  SpO2: --    Orthostatic VS  03-16-22 @ 08:16  Lying BP: --/-- HR: --  Sitting BP: 107/60 HR: 80  Standing BP: 97/55 HR: 97  Site: upper left arm  Mode: electronic  Orthostatic VS  03-15-22 @ 20:40  Lying BP: --/-- HR: --  Sitting BP: 150/92 HR: 83  Standing BP: 142/93 HR: 100  Site: --  Mode: --  Orthostatic VS  03-15-22 @ 10:16  Lying BP: --/-- HR: --  Sitting BP: 112/67 HR: 84  Standing BP: 113/76 HR: 88  Site: --  Mode: --

## 2022-03-16 NOTE — BH INPATIENT PSYCHIATRY PROGRESS NOTE - NSBHASSESSSUMMFT_PSY_ALL_CORE
20yo M, domiciled with father, high school graduate, unemployed, non-caregiver, PPH of depression, borderline personality disorder, alcohol abuse, 3 prior psych hospitalizations, most recently in 2/26/2021-3/28/2021 at [facility] for cutting in the setting of alcohol intoxication, currently in outpatient txt with therapist Dr. Beasley and psychiatrist Dr. Darren Hobson, hx NSSIB and SA via asphyxiation w/plastic bag and cutting, no hx of aggressive or violent behavior, currently binge drinking alcohol, no history of detox/rehab or withdrawal symptoms, no legal issues who was BIB self by referral from therapist after making suicidal statements over the phone and cutting forearm.     Upon assessment today, the patient's affect and mood seem low compared to previous days, but still overall improved. The pt. denied S/i/p or thoughts to self-harm. He is more communicative and willing to discuss contributing factors to his recent SIB and SI w/ plan. He is tolerating medications. The pt. stated he prefer his mother not visit for the remainder of his hospitalization. Continue Effexor and doxazosin as ordered. All stitches to right arm have fallen off. The pt. denies pain to the site, wounds edges are approximated, there is no redness, bleeding. Open to air at this time. Discharge tomorrow.    Plan:  Psychiatric medications: Effexor .5mg, doxazosin 4mg   Substance: Naltrexone 25mg w/ plan for Vivitrol 3/11; also off-label for NSSIB  Observation: routine, pt. is not an acute risk of suicide; he denies active S/i/b  Legal: 9.39 emergency  Medical: Pt. has 4cm laceration to right forearm; stitches have fallen off  Sx reduction, medication management, safety planning, milieu therapy.   Dispo: D/C tomorrow to [facility], [facility]

## 2022-03-17 VITALS — TEMPERATURE: 98 F

## 2022-03-17 PROCEDURE — 99238 HOSP IP/OBS DSCHRG MGMT 30/<: CPT

## 2022-03-17 RX ADMIN — Medication 187.5 MILLIGRAM(S): at 08:20

## 2022-03-17 NOTE — BH INPATIENT PSYCHIATRY DISCHARGE NOTE - NSBHDCRISKMITIGATE_PSY_ALL_CORE
Safety planning/Long acting injectable medication/Medications targeting suicidality/non-suicidal self injurious behavior/Other

## 2022-03-17 NOTE — BH INPATIENT PSYCHIATRY PROGRESS NOTE - NSBHMSEBEHAV_PSY_A_CORE
Cooperative/Other

## 2022-03-17 NOTE — BH DISCHARGE NOTE NURSING/SOCIAL WORK/PSYCH REHAB - NSDCADDINFO3FT_PSY_ALL_CORE
To set up Medicaid transportation, make sure to call at least 3 days in advance to set trip date and time. Phone Number: [phone] To set up Medicaid transportation, call at least 3 days in advance to set trip date and time and confirm  address and drop off address. Phone Number: [phone]

## 2022-03-17 NOTE — BH INPATIENT PSYCHIATRY DISCHARGE NOTE - NSBHSUBSTUSED_PSY_A_CORE
Called pt and discussed options. She has not tried OTC monistat but would like prescription for diflucan in case she does not tolerate this or it does not work. Sent rx with instructions. Discussed endometrial hyperplasia with pt. She reports she stopped provera several months ago. Discussed that she missed her f/u embx and needs to keep appt for this. Recommend she continue provera until repeat embx completed. Pt is aware appt may need to be re-booked due to epidemic but discussed importance of repeating as soon as possible. She notes understanding.   Alcohol

## 2022-03-17 NOTE — BH INPATIENT PSYCHIATRY PROGRESS NOTE - NSBHINPTBILLING_PSY_ALL_CORE
22484 - Inpatient Moderate Complexity
08119 - Inpatient Moderate Complexity
81062 - Inpatient Moderate Complexity
41182 - Inpatient Moderate Complexity
16031 - Inpatient Moderate Complexity
05523 - Inpatient Moderate Complexity
01073 - Inpatient Moderate Complexity
10293 - Inpatient Moderate Complexity

## 2022-03-17 NOTE — BH INPATIENT PSYCHIATRY PROGRESS NOTE - CURRENT MEDICATION
MEDICATIONS  (STANDING):  doxazosin 4 milliGRAM(s) Oral at bedtime  naltrexone   Injectable,  Long Acting. 380 milliGRAM(s) IntraMuscular once  venlafaxine  milliGRAM(s) Oral daily    MEDICATIONS  (PRN):  acetaminophen     Tablet .. 650 milliGRAM(s) Oral every 6 hours PRN Mild Pain (1 - 3), Moderate Pain (4 - 6), Severe Pain (7 - 10)  diphenhydrAMINE 50 milliGRAM(s) Oral every 6 hours PRN Rash and/or Itching  diphenhydrAMINE Injectable 50 milliGRAM(s) IV Push once PRN severe agitation  LORazepam     Tablet 2 milliGRAM(s) Oral every 2 hours PRN CIWA score increase by 2 points and current CIWA score GREATER THAN 9  LORazepam     Tablet 1 milliGRAM(s) Oral every 6 hours PRN Agitation  LORazepam   Injectable 1 milliGRAM(s) IntraMuscular Once PRN severe agitation  traZODone 50 milliGRAM(s) Oral at bedtime PRN insomnia  
MEDICATIONS  (STANDING):  doxazosin 4 milliGRAM(s) Oral at bedtime  venlafaxine  milliGRAM(s) Oral daily    MEDICATIONS  (PRN):  acetaminophen     Tablet .. 650 milliGRAM(s) Oral every 6 hours PRN Mild Pain (1 - 3), Moderate Pain (4 - 6), Severe Pain (7 - 10)  diphenhydrAMINE 50 milliGRAM(s) Oral every 6 hours PRN Rash and/or Itching  diphenhydrAMINE Injectable 50 milliGRAM(s) IV Push once PRN severe agitation  LORazepam     Tablet 2 milliGRAM(s) Oral every 2 hours PRN CIWA score increase by 2 points and current CIWA score GREATER THAN 9  LORazepam     Tablet 1 milliGRAM(s) Oral every 6 hours PRN Agitation  LORazepam   Injectable 1 milliGRAM(s) IntraMuscular Once PRN severe agitation  traZODone 50 milliGRAM(s) Oral at bedtime PRN insomnia  
MEDICATIONS  (STANDING):  doxazosin 4 milliGRAM(s) Oral at bedtime  venlafaxine  milliGRAM(s) Oral daily    MEDICATIONS  (PRN):  acetaminophen     Tablet .. 650 milliGRAM(s) Oral every 6 hours PRN Mild Pain (1 - 3), Moderate Pain (4 - 6), Severe Pain (7 - 10)  diphenhydrAMINE 50 milliGRAM(s) Oral every 6 hours PRN Rash and/or Itching  diphenhydrAMINE Injectable 50 milliGRAM(s) IV Push once PRN severe agitation  LORazepam     Tablet 2 milliGRAM(s) Oral every 2 hours PRN CIWA score increase by 2 points and current CIWA score GREATER THAN 9  LORazepam     Tablet 1 milliGRAM(s) Oral every 6 hours PRN Agitation  LORazepam   Injectable 1 milliGRAM(s) IntraMuscular Once PRN severe agitation  traZODone 50 milliGRAM(s) Oral at bedtime PRN insomnia  
MEDICATIONS  (STANDING):  doxazosin 4 milliGRAM(s) Oral at bedtime  venlafaxine .5 milliGRAM(s) Oral daily    MEDICATIONS  (PRN):  acetaminophen     Tablet .. 650 milliGRAM(s) Oral every 6 hours PRN Mild Pain (1 - 3), Moderate Pain (4 - 6), Severe Pain (7 - 10)  diphenhydrAMINE 50 milliGRAM(s) Oral every 6 hours PRN Rash and/or Itching  diphenhydrAMINE Injectable 50 milliGRAM(s) IV Push once PRN severe agitation  LORazepam     Tablet 1 milliGRAM(s) Oral every 6 hours PRN Agitation  LORazepam   Injectable 1 milliGRAM(s) IntraMuscular Once PRN severe agitation  traZODone 50 milliGRAM(s) Oral at bedtime PRN insomnia  
MEDICATIONS  (STANDING):  doxazosin 4 milliGRAM(s) Oral at bedtime    MEDICATIONS  (PRN):  acetaminophen     Tablet .. 650 milliGRAM(s) Oral every 6 hours PRN Mild Pain (1 - 3), Moderate Pain (4 - 6), Severe Pain (7 - 10)  diphenhydrAMINE 50 milliGRAM(s) Oral every 6 hours PRN Rash and/or Itching  diphenhydrAMINE Injectable 50 milliGRAM(s) IV Push once PRN severe agitation  LORazepam     Tablet 2 milliGRAM(s) Oral every 2 hours PRN CIWA score increase by 2 points and current CIWA score GREATER THAN 9  LORazepam     Tablet 1 milliGRAM(s) Oral every 6 hours PRN Agitation  LORazepam   Injectable 1 milliGRAM(s) IntraMuscular Once PRN severe agitation  traZODone 50 milliGRAM(s) Oral at bedtime PRN insomnia

## 2022-03-17 NOTE — BH INPATIENT PSYCHIATRY PROGRESS NOTE - NSBHMETABOLIC_PSY_ALL_CORE_FT
BMI: BMI (kg/m2): 18.6 (03-09-22 @ 13:15)  HbA1c: A1C with Estimated Average Glucose Result: 5.4 % (03-10-22 @ 09:41)    Glucose: POCT Blood Glucose.: 105 mg/dL (02-19-22 @ 11:40)    BP: 107/70 (03-11-22 @ 09:04) (107/70 - 130/86)  Lipid Panel: Date/Time: 03-10-22 @ 09:41  Cholesterol, Serum: 210  Direct LDL: --  HDL Cholesterol, Serum: 73  Total Cholesterol/HDL Ration Measurement: --  Triglycerides, Serum: 97  
BMI: BMI (kg/m2): 18.6 (03-09-22 @ 13:15)  HbA1c: A1C with Estimated Average Glucose Result: 5.4 % (03-10-22 @ 09:41)    Glucose: POCT Blood Glucose.: 105 mg/dL (02-19-22 @ 11:40)    BP: --  Lipid Panel: Date/Time: 03-10-22 @ 09:41  Cholesterol, Serum: 210  Direct LDL: --  HDL Cholesterol, Serum: 73  Total Cholesterol/HDL Ration Measurement: --  Triglycerides, Serum: 97  
BMI: BMI (kg/m2): 18.6 (03-09-22 @ 13:15)  HbA1c: A1C with Estimated Average Glucose Result: 5.4 % (03-10-22 @ 09:41)    Glucose: POCT Blood Glucose.: 105 mg/dL (02-19-22 @ 11:40)    BP: --  Lipid Panel: Date/Time: 03-10-22 @ 09:41  Cholesterol, Serum: 210  Direct LDL: --  HDL Cholesterol, Serum: 73  Total Cholesterol/HDL Ration Measurement: --  Triglycerides, Serum: 97  
BMI: BMI (kg/m2): 18.6 (03-09-22 @ 13:15)  HbA1c: A1C with Estimated Average Glucose Result: 5.4 % (03-10-22 @ 09:41)    Glucose: POCT Blood Glucose.: 105 mg/dL (02-19-22 @ 11:40)    BP: 107/70 (03-11-22 @ 09:04) (107/70 - 130/86)  Lipid Panel: Date/Time: 03-10-22 @ 09:41  Cholesterol, Serum: 210  Direct LDL: --  HDL Cholesterol, Serum: 73  Total Cholesterol/HDL Ration Measurement: --  Triglycerides, Serum: 97  
BMI: BMI (kg/m2): 18.6 (03-09-22 @ 13:15)  HbA1c: A1C with Estimated Average Glucose Result: 5.4 % (03-10-22 @ 09:41)    Glucose: POCT Blood Glucose.: 105 mg/dL (02-19-22 @ 11:40)    BP: 130/86 (03-10-22 @ 13:05) (120/61 - 137/82)  Lipid Panel: Date/Time: 03-10-22 @ 09:41  Cholesterol, Serum: 210  Direct LDL: --  HDL Cholesterol, Serum: 73  Total Cholesterol/HDL Ration Measurement: --  Triglycerides, Serum: 97  
BMI: BMI (kg/m2): 18.6 (03-09-22 @ 13:15)  HbA1c: A1C with Estimated Average Glucose Result: 5.4 % (03-10-22 @ 09:41)    Glucose: POCT Blood Glucose.: 105 mg/dL (02-19-22 @ 11:40)    BP: --  Lipid Panel: Date/Time: 03-10-22 @ 09:41  Cholesterol, Serum: 210  Direct LDL: --  HDL Cholesterol, Serum: 73  Total Cholesterol/HDL Ration Measurement: --  Triglycerides, Serum: 97  
BMI: BMI (kg/m2): 18.6 (03-09-22 @ 13:15)  HbA1c: A1C with Estimated Average Glucose Result: 5.4 % (03-10-22 @ 09:41)    Glucose: POCT Blood Glucose.: 105 mg/dL (02-19-22 @ 11:40)    BP: --  Lipid Panel: Date/Time: 03-10-22 @ 09:41  Cholesterol, Serum: 210  Direct LDL: --  HDL Cholesterol, Serum: 73  Total Cholesterol/HDL Ration Measurement: --  Triglycerides, Serum: 97  
BMI: BMI (kg/m2): 18.6 (03-09-22 @ 13:15)  HbA1c: A1C with Estimated Average Glucose Result: 5.4 % (03-10-22 @ 09:41)    Glucose: POCT Blood Glucose.: 105 mg/dL (02-19-22 @ 11:40)    BP: 107/70 (03-11-22 @ 09:04) (107/70 - 130/86)  Lipid Panel: Date/Time: 03-10-22 @ 09:41  Cholesterol, Serum: 210  Direct LDL: --  HDL Cholesterol, Serum: 73  Total Cholesterol/HDL Ration Measurement: --  Triglycerides, Serum: 97

## 2022-03-17 NOTE — BH INPATIENT PSYCHIATRY DISCHARGE NOTE - HOSPITAL COURSE
to be completed Upon admission, patient endorsed excessive ETOH use and subsequent self-harm by cutting, reported S/i/p to overdose, in the setting of worsening depression and PTSD exacerbation. The pt. stated that he had a triggering conversation with his mother 2 days prior. The pt. reports perseverating on the conversation which led to aforementioned events. The laceration to his right forearm required 7 stitches placed in the ED; all stitches fell by discharge. The pt. reported that he his ETOH use worsens depending on his mood and agreed to MAT with Vivitrol. He also agreed to Effexor for depression and anxiety, and doxazosin for PTSD Sx. The pt. was mostly isolated initially, but gradually became more social on the unit and began attending groups. He demonstrated and expressed difficulty describing and conveying his emotions but did attempt with encouragement. The pt. reported improved mood, however, reported anticipatory anxiety in regard to speaking with his mother upon discharge. On day of discharge, the pt. expressed he felt he was in a better place and committed to both substance and psychiatric follow-up. He was no longer an acute risk of harm to himself.     Patient was started on Effexor XR titrated to 187.5mg daily, Vivitrol 380mg IM given on 3/11, doxazosin titrated to 4mg HS. All medications given with good effect and tolerability. Symptoms gradually improved over the course of hospitalization. The patient was made aware of the risks and benefits of each medication and tolerated them well with no apparent side effects.     There were no behavioral problems on the unit.  Patient did not become agitated, did not require emergency intramuscular medications or seclusion/restraints, and did not self-harm on the unit. Patient remained actively engaged in treatment and participated in individual, group, and milieu therapy.  Patient got along appropriately with staff and peers.      Suicidal and aggression risk assessments were performed on the day of discharge. The patient is at elevated chronic risk of self-harm due to an underlying mood, personality, and substance disorder. He is not actively suicidal or manic, making [him/her] appropriate for less restrictive treatment as an outpatient without need for continued inpatient hospitalization. Protective factors for suicide include future orientation, therapeutic supports, treatment engagement, med compliance, lack of access, good physical health, residential stability. Risk factors include Hx of SA, chronic SI, NSSIB, psychosocial stressors, PTSD, depression, anxiety, substance use, impulsivity, anhedonia, poor social support.    Immediate risk was minimized by inpatient admission to a safe environment with appropriate supervision and limited access to lethal means. Future risk was minimized before discharge by treatment of anxiety/depressive symptoms, maximizing outpatient support, providing relevant patient education, discussing emergency procedures, and ensuring close follow-up. Patient denies all suicidal and aggressive/homicidal ideation, intent and plan, and, in view of demonstrated clinical improvement, is not judged to be an acute danger to self or others at this time. Although the patient remains at their chronic baseline risk of self-harm, such risk cannot be further ameliorated by continued inpatient treatment and the patient is therefore appropriate for discharge.     On the day of discharge, the patient’s mood and affect are normal/euthymic. Patient denies depressed mood and anxiety. Patient is not hopeless. Sleep and appetite are also normal (at baseline).  Pt’s motor performance and productivity of speech are WNL. Pt denies symptoms of psychosis including AH/VH with no apparent delusions (or is at baseline/not preoccupied with delusions).  Patient denies S/H/I/I/P.     Patient has made clinically meaningful progress during this hospitalization and has clearly benefited from medications and psychotherapy. On day of discharge, the patient has improved significantly and no longer requires inpatient treatment and care. Pt will be discharged and follow-up with outpatient care.      Patient was provided with extensive psychoeducation on treatment options and motivational counseling targeting healthy lifestyle. Patient was instructed on actions for crisis situations, understood and agreed to follow instructions for handling crisis, including coming to ER or calling 911 should the patient or their family feel that they are in danger of hurting self or others. Patient also was given Suicide Prevention Lifeline number 1-293.303.2944 and provided with instructions on its use.   Patient was advised to avoid driving until it is clear that her reactions are not impaired by medications. Motivational counseling was provided. Family is supportive and was provided with similar safety plan instructions.     Patient will be discharged with the following Diagnoses:    PRINCIPAL DISCHARGE DIAGNOSIS  Diagnosis: Major depressive disorder, recurrent episode, severe    SECONDARY DISCHARGE DIAGNOSES  Diagnosis: Borderline personality disorder    Diagnosis: PTSD (post-traumatic stress disorder)    Diagnosis: Superficial laceration of forearm    Diagnosis: Severe alcohol use disorder      Patient will be discharged on the following medications:   Effexor .5mg daily  Doxazosin 4mg HS  Vivitrol 380mg IM every 4 weeks; last administered on 3/11  TraZODone 50 mg oral tablet: 1 tab(s) orally once a day (at bedtime), As needed, insomnia    Handoff was emailed to Dr. Hobson and Dr. Beasley at [facility] and faxed to [facility] including discussion of hospital course, treatment, and medications. The patient’s appointments are on 21-Mar-2022 12:00 and 25-Mar-2022 12:00, respectively.

## 2022-03-17 NOTE — BH INPATIENT PSYCHIATRY DISCHARGE NOTE - NSBHDCMEDICALFT_PSY_A_CORE
Pt. admitted for 4cm laceration to right arm. 7 stitches placed in ED. All stitches have fallen off. No complications

## 2022-03-17 NOTE — BH INPATIENT PSYCHIATRY PROGRESS NOTE - NSBHASSESSSUMMFT_PSY_ALL_CORE
20yo M, domiciled with father, high school graduate, unemployed, non-caregiver, PPH of depression, borderline personality disorder, alcohol abuse, 3 prior psych hospitalizations, most recently in 2/26/2021-3/28/2021 at [facility] for cutting in the setting of alcohol intoxication, currently in outpatient txt with therapist Dr. Beasley and psychiatrist Dr. Darren Hobson, hx NSSIB and SA via asphyxiation w/plastic bag and cutting, no hx of aggressive or violent behavior, currently binge drinking alcohol, no history of detox/rehab or withdrawal symptoms, no legal issues who was BIB self by referral from therapist after making suicidal statements over the phone and cutting forearm.     Upon assessment today, the patient's mood is stable, he denies S/i/p. He was appreciative of staff and felt the hospitalization was beneficial. Will continue Effexor and doxazosin as ordered. All stitches to right arm have fallen off. The pt. denies pain to the site, wounds edges are approximated, there is no redness, bleeding. Open to air at this time. Discharge today.    Plan:  Psychiatric medications: Effexor .5mg, doxazosin 4mg   Substance:  Vivitrol 3/11; also off-label for NSSIB  Dispo: D/C tomorrow to [facility] [place], [facility]

## 2022-03-17 NOTE — BH INPATIENT PSYCHIATRY PROGRESS NOTE - NSBHFUPINTERVALCCFT_PSY_A_CORE
Depression/SI/SIB
"I feel not terrible today"
Depression/SI/SIB
"I'm doing better I guess"
Depression/SI/SIB

## 2022-03-17 NOTE — BH DISCHARGE NOTE NURSING/SOCIAL WORK/PSYCH REHAB - DISCHARGE INSTRUCTIONS AFTERCARE APPOINTMENTS
In order to check the location, date, or time of your aftercare appointment, please refer to your Discharge Instructions Document given to you upon leaving the hospital.  If you have lost the instructions please call [phone]

## 2022-03-17 NOTE — BH INPATIENT PSYCHIATRY PROGRESS NOTE - NSTXSUICIDGOAL_PSY_ALL_CORE
Will identify and utilize 2 coping skills
Will develop a suicide prevention/safety plan
Will identify and utilize 2 coping skills
Will identify and utilize 2 coping skills
Will develop a suicide prevention/safety plan
Will develop a suicide prevention/safety plan
Will identify and utilize 2 coping skills
Will identify and utilize 2 coping skills

## 2022-03-17 NOTE — BH DISCHARGE NOTE NURSING/SOCIAL WORK/PSYCH REHAB - NSDCPRGOAL_PSY_ALL_CORE
Pt met his goal. Pt has verbalized his coping skills such as poetry, reading, working out and talking to friends to manage symptoms. Pt admitted feeling anxious towards his discharge. Writer encouraged pt to utilize identified coping skills to manage symptoms. Pt currently denies SI, HI, AH, and VH. Pt reported less depressed and less anxious.     During this hospitalization, pt attended 85% of group attendance. During the group session, pt has tolerated group structure, participated relevantly. Pt was visible on the unit, showed good interactions with others. Pt maintained fair ADLs. Pt has participated in his safety plan. Pt was provided with [employer] survey.

## 2022-03-17 NOTE — BH INPATIENT PSYCHIATRY DISCHARGE NOTE - NSBHMETABOLIC_PSY_ALL_CORE_FT
BMI: BMI (kg/m2): 18.6 (03-09-22 @ 13:15)  HbA1c: A1C with Estimated Average Glucose Result: 5.4 % (03-10-22 @ 09:41)    Glucose: POCT Blood Glucose.: 105 mg/dL (02-19-22 @ 11:40)    BP: --  Lipid Panel: Date/Time: 03-10-22 @ 09:41  Cholesterol, Serum: 210  Direct LDL: --  HDL Cholesterol, Serum: 73  Total Cholesterol/HDL Ration Measurement: --  Triglycerides, Serum: 97

## 2022-03-17 NOTE — BH INPATIENT PSYCHIATRY DISCHARGE NOTE - CASE SUMMARY
Upon admission, patient endorsed excessive ETOH use and subsequent self-harm by cutting, reported S/i/p to overdose, in the setting of worsening depression and PTSD exacerbation. The pt. stated that he had a triggering conversation with his mother 2 days prior. The pt. reports perseverating on the conversation which led to aforementioned events. The laceration to his right forearm required 7 stitches placed in the ED; all stitches fell by discharge. The pt. reported that he his ETOH use worsens depending on his mood and agreed to MAT with Vivitrol. He also agreed to Effexor for depression and anxiety, and doxazosin for PTSD Sx. The pt. was mostly isolated initially, but gradually became more social on the unit and began attending groups. He demonstrated and expressed difficulty describing and conveying his emotions but did attempt with encouragement. The pt. reported improved mood, however, reported anticipatory anxiety in regard to speaking with his mother upon discharge. On day of discharge, the pt. expressed he felt he was in a better place and committed to both substance and psychiatric follow-up. He was no longer an acute risk of harm to himself.     Patient was started on Effexor XR titrated to 187.5mg daily, Vivitrol 380mg IM given on 3/11, doxazosin titrated to 4mg HS. All medications given with good effect and tolerability. Symptoms gradually improved over the course of hospitalization. The patient was made aware of the risks and benefits of each medication and tolerated them well with no apparent side effects.     There were no behavioral problems on the unit.  Patient did not become agitated, did not require emergency intramuscular medications or seclusion/restraints, and did not self-harm on the unit. Patient remained actively engaged in treatment and participated in individual, group, and milieu therapy.  Patient got along appropriately with staff and peers.      Suicidal and aggression risk assessments were performed on the day of discharge. The patient is at elevated chronic risk of self-harm due to an underlying mood, personality, and substance disorder. He is not actively suicidal or manic, making [him/her] appropriate for less restrictive treatment as an outpatient without need for continued inpatient hospitalization. Protective factors for suicide include future orientation, therapeutic supports, treatment engagement, med compliance, lack of access, good physical health, residential stability. Risk factors include Hx of SA, chronic SI, NSSIB, psychosocial stressors, PTSD, depression, anxiety, substance use, impulsivity, anhedonia, poor social support.    Immediate risk was minimized by inpatient admission to a safe environment with appropriate supervision and limited access to lethal means. Future risk was minimized before discharge by treatment of anxiety/depressive symptoms, maximizing outpatient support, providing relevant patient education, discussing emergency procedures, and ensuring close follow-up. Patient denies all suicidal and aggressive/homicidal ideation, intent and plan, and, in view of demonstrated clinical improvement, is not judged to be an acute danger to self or others at this time. Although the patient remains at their chronic baseline risk of self-harm, such risk cannot be further ameliorated by continued inpatient treatment and the patient is therefore appropriate for discharge.     On the day of discharge, the patient’s mood and affect are normal/euthymic. Patient denies depressed mood and anxiety. Patient is not hopeless. Sleep and appetite are also normal (at baseline).  Pt’s motor performance and productivity of speech are WNL. Pt denies symptoms of psychosis including AH/VH with no apparent delusions (or is at baseline/not preoccupied with delusions).  Patient denies S/H/I/I/P.     Patient has made clinically meaningful progress during this hospitalization and has clearly benefited from medications and psychotherapy. On day of discharge, the patient has improved significantly and no longer requires inpatient treatment and care. Pt will be discharged and follow-up with outpatient care.      Patient was provided with extensive psychoeducation on treatment options and motivational counseling targeting healthy lifestyle. Patient was instructed on actions for crisis situations, understood and agreed to follow instructions for handling crisis, including coming to ER or calling 911 should the patient or their family feel that they are in danger of hurting self or others. Patient also was given Suicide Prevention Lifeline number 1-658.998.4874 and provided with instructions on its use.   Patient was advised to avoid driving until it is clear that her reactions are not impaired by medications. Motivational counseling was provided. Family is supportive and was provided with similar safety plan instructions.     Patient will be discharged with the following Diagnoses:    PRINCIPAL DISCHARGE DIAGNOSIS  Diagnosis: Major depressive disorder, recurrent episode, severe    SECONDARY DISCHARGE DIAGNOSES  Diagnosis: Borderline personality disorder    Diagnosis: PTSD (post-traumatic stress disorder)    Diagnosis: Superficial laceration of forearm    Diagnosis: Severe alcohol use disorder      Patient will be discharged on the following medications:   Effexor .5mg daily  Doxazosin 4mg HS  Vivitrol 380mg IM every 4 weeks; last administered on 3/11  TraZODone 50 mg oral tablet: 1 tab(s) orally once a day (at bedtime), As needed, insomnia    Handoff was emailed to Dr. Hobson and Dr. Beasley at [facility] and faxed to [facility] including discussion of hospital course, treatment, and medications. The patient’s appointments are on 21-Mar-2022 12:00 and 25-Mar-2022 12:00, respectively.

## 2022-03-17 NOTE — BH INPATIENT PSYCHIATRY DISCHARGE NOTE - MODIFICATIONS
Admitted for episode of self-harm with etoh intoxication, active SI, in context of depression related to PTSD exacerbation, borderline personality disorder. Discussed trauma history, patient identifying childhood trauma, ongoing exacerbation in familial relationships. Benefit from trial of doxazosin. Help-seeking for etoh use, began Vivitrol. Chronic risk remains though acute risk appropriately addressed by time of discharge.

## 2022-03-17 NOTE — BH DISCHARGE NOTE NURSING/SOCIAL WORK/PSYCH REHAB - PATIENT PORTAL LINK FT
You can access the FollowMyHealth Patient Portal offered by [facility] by registering at the following website: [email or web address]. By joining [employer]’s FollowMyHealth portal, you will also be able to view your health information using other applications (apps) compatible with our system.

## 2022-03-17 NOTE — BH DISCHARGE NOTE NURSING/SOCIAL WORK/PSYCH REHAB - NSBHDCADDR2FT_A_CORE
This appointment is virtual.  [address] [facility] [address]

## 2022-03-17 NOTE — BH INPATIENT PSYCHIATRY PROGRESS NOTE - NSICDXBHPRIMARYDX_PSY_ALL_CORE
Borderline personality disorder   F60.3  

## 2022-03-17 NOTE — BH INPATIENT PSYCHIATRY PROGRESS NOTE - PRN MEDS
MEDICATIONS  (PRN):  acetaminophen     Tablet .. 650 milliGRAM(s) Oral every 6 hours PRN Mild Pain (1 - 3), Moderate Pain (4 - 6), Severe Pain (7 - 10)  diphenhydrAMINE 50 milliGRAM(s) Oral every 6 hours PRN Rash and/or Itching  diphenhydrAMINE Injectable 50 milliGRAM(s) IV Push once PRN severe agitation  LORazepam     Tablet 1 milliGRAM(s) Oral every 6 hours PRN Agitation  LORazepam   Injectable 1 milliGRAM(s) IntraMuscular Once PRN severe agitation  traZODone 50 milliGRAM(s) Oral at bedtime PRN insomnia  
MEDICATIONS  (PRN):  acetaminophen     Tablet .. 650 milliGRAM(s) Oral every 6 hours PRN Mild Pain (1 - 3), Moderate Pain (4 - 6), Severe Pain (7 - 10)  diphenhydrAMINE 50 milliGRAM(s) Oral every 6 hours PRN Rash and/or Itching  diphenhydrAMINE Injectable 50 milliGRAM(s) IV Push once PRN severe agitation  LORazepam     Tablet 2 milliGRAM(s) Oral every 2 hours PRN CIWA score increase by 2 points and current CIWA score GREATER THAN 9  LORazepam     Tablet 1 milliGRAM(s) Oral every 6 hours PRN Agitation  LORazepam   Injectable 1 milliGRAM(s) IntraMuscular Once PRN severe agitation  traZODone 50 milliGRAM(s) Oral at bedtime PRN insomnia  
MEDICATIONS  (PRN):  acetaminophen     Tablet .. 650 milliGRAM(s) Oral every 6 hours PRN Mild Pain (1 - 3), Moderate Pain (4 - 6), Severe Pain (7 - 10)  diphenhydrAMINE 50 milliGRAM(s) Oral every 6 hours PRN Rash and/or Itching  diphenhydrAMINE Injectable 50 milliGRAM(s) IV Push once PRN severe agitation  LORazepam     Tablet 2 milliGRAM(s) Oral every 2 hours PRN CIWA score increase by 2 points and current CIWA score GREATER THAN 9  LORazepam     Tablet 1 milliGRAM(s) Oral every 6 hours PRN Agitation  LORazepam   Injectable 1 milliGRAM(s) IntraMuscular Once PRN severe agitation  traZODone 50 milliGRAM(s) Oral at bedtime PRN insomnia  
MEDICATIONS  (PRN):  acetaminophen     Tablet .. 650 milliGRAM(s) Oral every 6 hours PRN Mild Pain (1 - 3), Moderate Pain (4 - 6), Severe Pain (7 - 10)  diphenhydrAMINE 50 milliGRAM(s) Oral every 6 hours PRN Rash and/or Itching  diphenhydrAMINE Injectable 50 milliGRAM(s) IV Push once PRN severe agitation  LORazepam     Tablet 1 milliGRAM(s) Oral every 6 hours PRN Agitation  LORazepam   Injectable 1 milliGRAM(s) IntraMuscular Once PRN severe agitation  traZODone 50 milliGRAM(s) Oral at bedtime PRN insomnia  
MEDICATIONS  (PRN):  acetaminophen     Tablet .. 650 milliGRAM(s) Oral every 6 hours PRN Mild Pain (1 - 3), Moderate Pain (4 - 6), Severe Pain (7 - 10)  diphenhydrAMINE 50 milliGRAM(s) Oral every 6 hours PRN Rash and/or Itching  diphenhydrAMINE Injectable 50 milliGRAM(s) IV Push once PRN severe agitation  LORazepam     Tablet 1 milliGRAM(s) Oral every 6 hours PRN Agitation  LORazepam   Injectable 1 milliGRAM(s) IntraMuscular Once PRN severe agitation  traZODone 50 milliGRAM(s) Oral at bedtime PRN insomnia  
MEDICATIONS  (PRN):  acetaminophen     Tablet .. 650 milliGRAM(s) Oral every 6 hours PRN Mild Pain (1 - 3), Moderate Pain (4 - 6), Severe Pain (7 - 10)  diphenhydrAMINE 50 milliGRAM(s) Oral every 6 hours PRN Rash and/or Itching  diphenhydrAMINE Injectable 50 milliGRAM(s) IV Push once PRN severe agitation  LORazepam     Tablet 2 milliGRAM(s) Oral every 2 hours PRN CIWA score increase by 2 points and current CIWA score GREATER THAN 9  LORazepam     Tablet 1 milliGRAM(s) Oral every 6 hours PRN Agitation  LORazepam   Injectable 1 milliGRAM(s) IntraMuscular Once PRN severe agitation  traZODone 50 milliGRAM(s) Oral at bedtime PRN insomnia  
MEDICATIONS  (PRN):  acetaminophen     Tablet .. 650 milliGRAM(s) Oral every 6 hours PRN Mild Pain (1 - 3), Moderate Pain (4 - 6), Severe Pain (7 - 10)  diphenhydrAMINE 50 milliGRAM(s) Oral every 6 hours PRN Rash and/or Itching  diphenhydrAMINE Injectable 50 milliGRAM(s) IV Push once PRN severe agitation  LORazepam     Tablet 2 milliGRAM(s) Oral every 2 hours PRN CIWA score increase by 2 points and current CIWA score GREATER THAN 9  LORazepam     Tablet 1 milliGRAM(s) Oral every 6 hours PRN Agitation  LORazepam   Injectable 1 milliGRAM(s) IntraMuscular Once PRN severe agitation  traZODone 50 milliGRAM(s) Oral at bedtime PRN insomnia  
MEDICATIONS  (PRN):  acetaminophen     Tablet .. 650 milliGRAM(s) Oral every 6 hours PRN Mild Pain (1 - 3), Moderate Pain (4 - 6), Severe Pain (7 - 10)  diphenhydrAMINE 50 milliGRAM(s) Oral every 6 hours PRN Rash and/or Itching  diphenhydrAMINE Injectable 50 milliGRAM(s) IV Push once PRN severe agitation  LORazepam     Tablet 1 milliGRAM(s) Oral every 6 hours PRN Agitation  LORazepam   Injectable 1 milliGRAM(s) IntraMuscular Once PRN severe agitation  traZODone 50 milliGRAM(s) Oral at bedtime PRN insomnia

## 2022-03-17 NOTE — BH INPATIENT PSYCHIATRY PROGRESS NOTE - NSBHCHARTREVIEWVS_PSY_A_CORE FT
Vital Signs Last 24 Hrs  T(C): 36.2 (03-17-22 @ 14:47), Max: 36.4 (03-17-22 @ 06:17)  T(F): 97.1 (03-17-22 @ 14:47), Max: 97.5 (03-17-22 @ 06:17)  HR: --  BP: --  BP(mean): --  RR: --  SpO2: --    Orthostatic VS  03-17-22 @ 08:28  Lying BP: --/-- HR: --  Sitting BP: 101/62 HR: 98  Standing BP: 92/58 HR: 108  Site: --  Mode: --  Orthostatic VS  03-16-22 @ 20:43  Lying BP: --/-- HR: --  Sitting BP: 132/79 HR: 90  Standing BP: 136/79 HR: 101  Site: --  Mode: --  Orthostatic VS  03-16-22 @ 08:16  Lying BP: --/-- HR: --  Sitting BP: 107/60 HR: 80  Standing BP: 97/55 HR: 97  Site: upper left arm  Mode: electronic  Orthostatic VS  03-15-22 @ 20:40  Lying BP: --/-- HR: --  Sitting BP: 150/92 HR: 83  Standing BP: 142/93 HR: 100  Site: --  Mode: --

## 2022-03-17 NOTE — BH INPATIENT PSYCHIATRY DISCHARGE NOTE - NSDCMRMEDTOKEN_GEN_ALL_CORE_FT
doxazosin 4 mg oral tablet: 1 tab(s) orally once a day (at bedtime)  Effexor  mg oral capsule, extended release: 1 cap(s) orally once a day  traZODone 50 mg oral tablet: 1 tab(s) orally once a day (at bedtime), As needed, insomnia  venlafaxine 37.5 mg oral capsule, extended release: 1 cap(s) orally once a day  Vivitrol 380 mg intramuscular injection, extended release: 380 milligram(s) intramuscular once a month

## 2022-03-17 NOTE — BH DISCHARGE NOTE NURSING/SOCIAL WORK/PSYCH REHAB - NSCDUDCCRISIS_PSY_A_CORE
[facility]  1 (809) [facility] ([phone])  Text "WELL" to 28318  Website: www.[employer]/.[facility] 1 (016) 331-GLQO (5751) Website: www.safehorizon.org/.National Suicide Prevention Lifeline 1 (672) 267-8694/.  [facility]  1 (558) LIFENET (610-4761)/.  [facility]  [address] [place] [id number] [phone]   Hours:  Monday through Friday from 9 AM to 3 PM/.  [employer] - [facility] Crisis Line  [phone], Option 1

## 2022-03-17 NOTE — BH INPATIENT PSYCHIATRY PROGRESS NOTE - NSBHFUPINTERVALHXFT_PSY_A_CORE
Patient is followed up for depression and SI, recent SIB, and drug use.  Chart, medications and labs reviewed, no acute findings.  Patient is discussed with nursing staff. The pt. slept well, no changes to appetite. He is medication adherent. No SE noted.   The pt. was seen in the community. He was seen by the treatment team prior to discharge. He described his mood as "anxious" as he will have to speak with his mother post discharge. The pt. stated he felt like a "coward" for not being able to candidly tell his mother how he is feeling. The pt. otherwise reports he is feeling better since admission and believes he is a better place in terms of tolerance. He denies S/i/p, H/i/p, AH/VH and delusions. The pt. was informed to visit the crisis center, call 911 or visit the nearest ED if Sx worsen. Vital signs are stable.

## 2022-03-17 NOTE — BH INPATIENT PSYCHIATRY DISCHARGE NOTE - NSBHSUICIDESTATUS_PSY_ALL_CORE
Pt. currently denies S/i/p. Risk factors include Hx of suicide attempt and s/i/p, personality traits, depression, anxiety, PTSD, Hx of medication non-adherence, male gender, psychosocial stressors

## 2022-03-17 NOTE — BH DISCHARGE NOTE NURSING/SOCIAL WORK/PSYCH REHAB - NSDCVIVACCINE_GEN_ALL_CORE_FT
Ventricular Rate : 78  Atrial Rate : 79  P-R Interval : 178  QRS Duration : 89  Q-T Interval : 389  QTC Calculation(Bazett) : 444  P Axis : 24  R Axis : -46  T Axis : 6  Diagnosis : Sinus rhythm  Left anterior fascicular block  Consider left ventricular hypertrophy    Confirmed by Olivier Alcazar ([id number]) on 12/27/2019 10:15:46 AM  
Tdap; 11-Oct-2018 20:41; Ildefonso Bullard (RN); [employer]; [id number] (Exp. Date: 10-Oct-2020); IntraMuscular; Deltoid Right.; 0.5 milliLiter(s); VIS (VIS Published: 09-May-2013, VIS Presented: 11-Oct-2018);   Tdap; 23-Jan-2020 19:35; Roopa Garcia (ROBERT); [employer]; [id number] (Exp. Date: 17-Sep-2021); IntraMuscular; Deltoid Left.; 0.5 milliLiter(s); VIS (VIS Published: 09-May-2013, VIS Presented: 23-Jan-2020);

## 2022-03-17 NOTE — BH INPATIENT PSYCHIATRY DISCHARGE NOTE - OTHER PAST PSYCHIATRIC HISTORY (INCLUDE DETAILS REGARDING ONSET, COURSE OF ILLNESS, INPATIENT/OUTPATIENT TREATMENT)
Pt is a 20yo M, domiciled with father, high school graduate, unemployed, non-caregiver, PPHx of depression, borderline personality disorder, alcohol abuse, 3 prior psych hospitalizations most recently in 2/26/2021-3/28/2021 at [facility] for cutting in the setting of alcohol intoxication. Pt currently in outpatient txt with therapist Dr. Beasley and psychiatrist Dr. Darren Hobson, hx NSSIB and SA via asphyxiation w/plastic bag and cutting, no hx of aggressive or violent behavior, currently binge drinking alcohol, no history of detox/rehab or withdrawal symptoms, no legal issues who was BIB self by referral from therapist after making suicidal statements over the phone and cutting forearm.
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
## 2022-03-17 NOTE — BH INPATIENT PSYCHIATRY PROGRESS NOTE - NSDCCRITERIA_PSY_ALL_CORE
Sx reduction/remission, 

## 2022-03-17 NOTE — BH INPATIENT PSYCHIATRY DISCHARGE NOTE - NSDCCPCAREPLAN_GEN_ALL_CORE_FT
PRINCIPAL DISCHARGE DIAGNOSIS  Diagnosis: Major depressive disorder, recurrent episode, severe  Assessment and Plan of Treatment:       SECONDARY DISCHARGE DIAGNOSES  Diagnosis: Borderline personality disorder  Assessment and Plan of Treatment:     Diagnosis: PTSD (post-traumatic stress disorder)  Assessment and Plan of Treatment:     Diagnosis: Superficial laceration of forearm  Assessment and Plan of Treatment:     Diagnosis: Severe alcohol use disorder  Assessment and Plan of Treatment:

## 2022-03-17 NOTE — BH INPATIENT PSYCHIATRY PROGRESS NOTE - NSBHCONSBHPROVDETAILS_PSY_A_CORE  FT
Dr. Beasley and Dr. Hobson

## 2022-03-17 NOTE — BH INPATIENT PSYCHIATRY PROGRESS NOTE - NSICDXBHSECONDARYDX_PSY_ALL_CORE
PTSD (post-traumatic stress disorder)   F43.10  

## 2022-06-02 PROCEDURE — 98968 PH1 ASSMT&MGMT NQHP 21-30: CPT

## 2022-06-02 PROCEDURE — 90834 PSYTX W PT 45 MINUTES: CPT | Mod: 95

## 2022-07-21 ENCOUNTER — INPATIENT (INPATIENT)
Facility: HOSPITAL | Age: 22
LOS: 10 days | Discharge: HOME | End: 2022-08-01
Attending: PSYCHIATRY & NEUROLOGY | Admitting: PSYCHIATRY & NEUROLOGY

## 2022-07-21 VITALS
HEIGHT: 74 IN | HEART RATE: 73 BPM | DIASTOLIC BLOOD PRESSURE: 76 MMHG | RESPIRATION RATE: 20 BRPM | OXYGEN SATURATION: 99 % | SYSTOLIC BLOOD PRESSURE: 139 MMHG | TEMPERATURE: 98 F

## 2022-07-21 DIAGNOSIS — R45.851 SUICIDAL IDEATIONS: ICD-10-CM

## 2022-07-21 DIAGNOSIS — F12.10 CANNABIS ABUSE, UNCOMPLICATED: ICD-10-CM

## 2022-07-21 DIAGNOSIS — F32.9 MAJOR DEPRESSIVE DISORDER, SINGLE EPISODE, UNSPECIFIED: ICD-10-CM

## 2022-07-21 DIAGNOSIS — F60.3 BORDERLINE PERSONALITY DISORDER: ICD-10-CM

## 2022-07-21 DIAGNOSIS — F43.10 POST-TRAUMATIC STRESS DISORDER, UNSPECIFIED: ICD-10-CM

## 2022-07-21 DIAGNOSIS — F10.10 ALCOHOL ABUSE, UNCOMPLICATED: ICD-10-CM

## 2022-07-21 LAB
ALBUMIN SERPL ELPH-MCNC: 5.2 G/DL — HIGH (ref 3.3–5)
ALP SERPL-CCNC: 45 U/L — SIGNIFICANT CHANGE UP (ref 40–120)
ALT FLD-CCNC: 23 U/L — SIGNIFICANT CHANGE UP (ref 4–41)
ANION GAP SERPL CALC-SCNC: 14 MMOL/L — SIGNIFICANT CHANGE UP (ref 7–14)
APAP SERPL-MCNC: <10 UG/ML — LOW (ref 15–25)
APPEARANCE UR: CLEAR — SIGNIFICANT CHANGE UP
AST SERPL-CCNC: 30 U/L — SIGNIFICANT CHANGE UP (ref 4–40)
BASOPHILS # BLD AUTO: 0.02 K/UL — SIGNIFICANT CHANGE UP (ref 0–0.2)
BASOPHILS NFR BLD AUTO: 0.5 % — SIGNIFICANT CHANGE UP (ref 0–2)
BILIRUB SERPL-MCNC: 0.9 MG/DL — SIGNIFICANT CHANGE UP (ref 0.2–1.2)
BILIRUB UR-MCNC: NEGATIVE — SIGNIFICANT CHANGE UP
BUN SERPL-MCNC: 12 MG/DL — SIGNIFICANT CHANGE UP (ref 7–23)
CALCIUM SERPL-MCNC: 9.3 MG/DL — SIGNIFICANT CHANGE UP (ref 8.4–10.5)
CHLORIDE SERPL-SCNC: 102 MMOL/L — SIGNIFICANT CHANGE UP (ref 98–107)
CO2 SERPL-SCNC: 25 MMOL/L — SIGNIFICANT CHANGE UP (ref 22–31)
COLOR SPEC: YELLOW — SIGNIFICANT CHANGE UP
CREAT SERPL-MCNC: 0.99 MG/DL — SIGNIFICANT CHANGE UP (ref 0.5–1.3)
DIFF PNL FLD: ABNORMAL
EGFR: 109 ML/MIN/1.73M2 — SIGNIFICANT CHANGE UP
EOSINOPHIL # BLD AUTO: 0.06 K/UL — SIGNIFICANT CHANGE UP (ref 0–0.5)
EOSINOPHIL NFR BLD AUTO: 1.5 % — SIGNIFICANT CHANGE UP (ref 0–6)
ETHANOL SERPL-MCNC: 179 MG/DL — HIGH
FLUAV AG NPH QL: SIGNIFICANT CHANGE UP
FLUBV AG NPH QL: SIGNIFICANT CHANGE UP
GLUCOSE SERPL-MCNC: 87 MG/DL — SIGNIFICANT CHANGE UP (ref 70–99)
GLUCOSE UR QL: NEGATIVE — SIGNIFICANT CHANGE UP
HCT VFR BLD CALC: 45 % — SIGNIFICANT CHANGE UP (ref 39–50)
HGB BLD-MCNC: 15.1 G/DL — SIGNIFICANT CHANGE UP (ref 13–17)
IANC: 1.56 K/UL — LOW (ref 1.8–7.4)
IMM GRANULOCYTES NFR BLD AUTO: 0.3 % — SIGNIFICANT CHANGE UP (ref 0–1.5)
KETONES UR-MCNC: ABNORMAL
LEUKOCYTE ESTERASE UR-ACNC: NEGATIVE — SIGNIFICANT CHANGE UP
LYMPHOCYTES # BLD AUTO: 1.91 K/UL — SIGNIFICANT CHANGE UP (ref 1–3.3)
LYMPHOCYTES # BLD AUTO: 48.4 % — HIGH (ref 13–44)
MCHC RBC-ENTMCNC: 30.1 PG — SIGNIFICANT CHANGE UP (ref 27–34)
MCHC RBC-ENTMCNC: 33.6 GM/DL — SIGNIFICANT CHANGE UP (ref 32–36)
MCV RBC AUTO: 89.6 FL — SIGNIFICANT CHANGE UP (ref 80–100)
MONOCYTES # BLD AUTO: 0.39 K/UL — SIGNIFICANT CHANGE UP (ref 0–0.9)
MONOCYTES NFR BLD AUTO: 9.9 % — SIGNIFICANT CHANGE UP (ref 2–14)
NEUTROPHILS # BLD AUTO: 1.56 K/UL — LOW (ref 1.8–7.4)
NEUTROPHILS NFR BLD AUTO: 39.4 % — LOW (ref 43–77)
NITRITE UR-MCNC: NEGATIVE — SIGNIFICANT CHANGE UP
NRBC # BLD: 0 /100 WBCS — SIGNIFICANT CHANGE UP
NRBC # FLD: 0 K/UL — SIGNIFICANT CHANGE UP
PCP SPEC-MCNC: SIGNIFICANT CHANGE UP
PH UR: 6 — SIGNIFICANT CHANGE UP (ref 5–8)
PLATELET # BLD AUTO: 199 K/UL — SIGNIFICANT CHANGE UP (ref 150–400)
POTASSIUM SERPL-MCNC: 3.9 MMOL/L — SIGNIFICANT CHANGE UP (ref 3.5–5.3)
POTASSIUM SERPL-SCNC: 3.9 MMOL/L — SIGNIFICANT CHANGE UP (ref 3.5–5.3)
PROT SERPL-MCNC: 7.6 G/DL — SIGNIFICANT CHANGE UP (ref 6–8.3)
PROT UR-MCNC: ABNORMAL
RBC # BLD: 5.02 M/UL — SIGNIFICANT CHANGE UP (ref 4.2–5.8)
RBC # FLD: 14.1 % — SIGNIFICANT CHANGE UP (ref 10.3–14.5)
RSV RNA NPH QL NAA+NON-PROBE: SIGNIFICANT CHANGE UP
SALICYLATES SERPL-MCNC: <0.3 MG/DL — LOW (ref 15–30)
SARS-COV-2 RNA SPEC QL NAA+PROBE: SIGNIFICANT CHANGE UP
SODIUM SERPL-SCNC: 141 MMOL/L — SIGNIFICANT CHANGE UP (ref 135–145)
SP GR SPEC: 1.04 — SIGNIFICANT CHANGE UP (ref 1–1.05)
TSH SERPL-MCNC: 1.23 UIU/ML — SIGNIFICANT CHANGE UP (ref 0.27–4.2)
UROBILINOGEN FLD QL: SIGNIFICANT CHANGE UP
WBC # BLD: 3.95 K/UL — SIGNIFICANT CHANGE UP (ref 3.8–10.5)
WBC # FLD AUTO: 3.95 K/UL — SIGNIFICANT CHANGE UP (ref 3.8–10.5)

## 2022-07-21 PROCEDURE — 99285 EMERGENCY DEPT VISIT HI MDM: CPT

## 2022-07-21 RX ORDER — THIAMINE MONONITRATE (VIT B1) 100 MG
100 TABLET ORAL DAILY
Refills: 0 | Status: COMPLETED | OUTPATIENT
Start: 2022-07-21 | End: 2022-07-24

## 2022-07-21 RX ORDER — FOLIC ACID 0.8 MG
1 TABLET ORAL DAILY
Refills: 0 | Status: COMPLETED | OUTPATIENT
Start: 2022-07-21 | End: 2022-07-28

## 2022-07-21 RX ORDER — TRAZODONE HCL 50 MG
50 TABLET ORAL AT BEDTIME
Refills: 0 | Status: DISCONTINUED | OUTPATIENT
Start: 2022-07-21 | End: 2022-08-01

## 2022-07-21 RX ORDER — DIPHENHYDRAMINE HCL 50 MG
50 CAPSULE ORAL ONCE
Refills: 0 | Status: DISCONTINUED | OUTPATIENT
Start: 2022-07-21 | End: 2022-08-01

## 2022-07-21 RX ORDER — SERTRALINE 25 MG/1
50 TABLET, FILM COATED ORAL DAILY
Refills: 0 | Status: DISCONTINUED | OUTPATIENT
Start: 2022-07-21 | End: 2022-07-22

## 2022-07-21 RX ORDER — NALTREXONE HYDROCHLORIDE 50 MG/1
380 TABLET, FILM COATED ORAL
Qty: 0 | Refills: 0 | DISCHARGE

## 2022-07-21 NOTE — ED ADULT NURSE NOTE - OBJECTIVE STATEMENT
Pt was sent to er by family via 911 call for self-injury. Pt made superfical cuts to his forearm, and has a hx of cutting him self on the arms  with extensive scarring.  Currently deneis si at the moment, states he cuts when he feels bad. Also denies hi,ah,vh,etoh, drug use.

## 2022-07-21 NOTE — ED BEHAVIORAL HEALTH ASSESSMENT NOTE - ADDITIONAL DETAILS ALL
past hx of asphyxiation; multiple cutting episodes. recently engaged in cutting his left forearm + neck using a razor blade.

## 2022-07-21 NOTE — ED PROVIDER NOTE - OBJECTIVE STATEMENT
this is a 22 y.o male with a PMhx of Depression/suicidal ideations presented to the ED for psychiatry evaluation, patient was brought in to the ED for suicidal attempt last night, he attempted to cut himself multiple times on the wrists, he states that the last time he did it was in january/february where he was hospitalized. Patient states that he has been compliant with the medication, denies having any drug or alcohol usage today, however did drink a few days prior. Patient denies having any CP, SOB, CARDONA, headaches, dizziness, nausea, vomiting, diarrhea, constipation.

## 2022-07-21 NOTE — ED BEHAVIORAL HEALTH ASSESSMENT NOTE - DIFFERENTIAL
MDD  Borderline Personality Disorder  Alcohol Use Disorder  rule out substance induced depressive disorder

## 2022-07-21 NOTE — ED BEHAVIORAL HEALTH ASSESSMENT NOTE - NSACTIVEVENT_PSY_ALL_CORE
unemployment, ongoing family relationship conflicts/Triggering events leading to humiliation, shame, and/or despair (e.g., Loss of relationship, financial or health status) (real or anticipated)/Substance intoxication or withdrawal

## 2022-07-21 NOTE — ED BEHAVIORAL HEALTH ASSESSMENT NOTE - NSBHSAALC_PSY_A_CORE FT
has hx of alcohol abuse. mother reported that Pt has recently been drinking for 48 hrs. Pt denied drinking alcohol today. Per chart review, the Pt has had periods of binging but does not drink regularly.
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
## 2022-07-21 NOTE — ED BEHAVIORAL HEALTH ASSESSMENT NOTE - NSBHROSSYSTEMS_PSY_ALL_CORE
Pt currently denied experiencing any headaches; has no dizziness, no blurring of vision; no sorethroat; no cough, no fever. no chest pains, no SOB, no palpitations, no abdominal pains, no nausea/ vomiting/ diarrhea, no dysuria, no hesitancy, no arthralgia/ no pruritus. denied any muscle/ joint pains/Psychiatric

## 2022-07-21 NOTE — ED BEHAVIORAL HEALTH ASSESSMENT NOTE - PSYCHIATRIC ISSUES AND PLAN (INCLUDE STANDING AND PRN MEDICATION)
unsure of Zoloft dose (claims he is taking 150mg daily; mother suspects Pt is not compliant with current meds). continue with Melatonin 3mg HS.  Trazodone 50mg HS PRN as off label for sleep diturbance; ativan 2mg PO/IM + benadryl 50mg IM q6Hrs PRN for agitation/severe agitation
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
## 2022-07-21 NOTE — ED BEHAVIORAL HEALTH ASSESSMENT NOTE - SUICIDAL IDEATION DETAILS
mother reported that Pt has been drinking nonstop for the last 2 days. lately, escalation of SI; been feeling more hopeless; told therapist that if he were to kill self, would do it via cutting.

## 2022-07-21 NOTE — ED BEHAVIORAL HEALTH ASSESSMENT NOTE - PERCEPTIONS
Normal vision: sees adequately in most situations; can see medication labels, newsprint No abnormalities

## 2022-07-21 NOTE — ED BEHAVIORAL HEALTH ASSESSMENT NOTE - DESCRIPTION
currently lives at home with father, uncle, grandmother and brothers. Completed HS. attended [employer] but was dismissed due to failing grades. Currently unemployed. last worked in 12/2021 as a [employer]. likes to write, play the guitar; listening to music; working out/ running, playing video games. no reported pets. no reported access to guns Since his  ED arrival, the Pt has been calm, cooperative, dysphoric.  There has been no occurrence of agitation/aggressive behavior.  No verbalization of active SI/HI.   currently, there are no signs/symptoms suggestive of acute peter or florid psychosis.  Pt is not showing any signs/symptoms of acute intoxication or impending withdrawal.  He is not delirious.  Pt has not tested limits.. Has maintained appropriate boundaries. Pt has been easily redirected.  Overall, there has been no management issues.  no PRN meds     Vital Signs Last 24 Hrs  T(C): 36.7 (21 Jul 2022 14:45), Max: 36.7 (21 Jul 2022 14:45)  T(F): 98 (21 Jul 2022 14:45), Max: 98 (21 Jul 2022 14:45)  HR: 73 (21 Jul 2022 14:45) (73 - 73)  BP: 139/76 (21 Jul 2022 14:45) (139/76 - 139/76)  BP(mean): --  RR: 20 (21 Jul 2022 14:45) (20 - 20)  SpO2: 99% (21 Jul 2022 14:45) (99% - 99%)    Parameters below as of 21 Jul 2022 14:45  Patient On (Oxygen Delivery Method): room air None Since his  ED arrival, the Pt has been calm, cooperative, dysphoric.  There has been no occurrence of agitation/aggressive behavior.  No verbalization of active SI/HI.   currently, there are no signs/symptoms suggestive of acute peter or florid psychosis.  does not appear to be inebriated.  Pt is not showing any signs/symptoms of impending withdrawal.  He is not delirious.  Pt has not tested limits.. Has maintained appropriate boundaries. Pt has been easily redirected.  Overall, there has been no management issues.  no PRN meds     Vital Signs Last 24 Hrs  T(C): 36.7 (21 Jul 2022 14:45), Max: 36.7 (21 Jul 2022 14:45)  T(F): 98 (21 Jul 2022 14:45), Max: 98 (21 Jul 2022 14:45)  HR: 73 (21 Jul 2022 14:45) (73 - 73)  BP: 139/76 (21 Jul 2022 14:45) (139/76 - 139/76)  BP(mean): --  RR: 20 (21 Jul 2022 14:45) (20 - 20)  SpO2: 99% (21 Jul 2022 14:45) (99% - 99%)    Parameters below as of 21 Jul 2022 14:45  Patient On (Oxygen Delivery Method): room air     (as of 4:39PM)

## 2022-07-21 NOTE — ED BEHAVIORAL HEALTH ASSESSMENT NOTE - OTHER PAST PSYCHIATRIC HISTORY (INCLUDE DETAILS REGARDING ONSET, COURSE OF ILLNESS, INPATIENT/OUTPATIENT TREATMENT)
has multiple past in-patient hospitalizations (last [facility] admission in 3/2022 due to cutting behavior needing repair amidst alcohol intoxication)  1 prior suicide attempt via asphyxiation whilst intoxicated, 1/2020  Current outpatient psychiatric treatment with Dr. ANA Hobson - last evaluated back in 3/23/2022  Current outpatient therapy with Dr. SPENCER Beasley has multiple past in-patient hospitalizations (last [facility] admission in 3/2022 due to cutting behavior needing repair amidst alcohol intoxication)  - was found to have 4cm cut over right forearm. Laceration was irrigated and required 7 sutures. BAL was at 303  1 prior suicide attempt via asphyxiation whilst intoxicated, 1/2020  Current outpatient psychiatric treatment with Dr. ANA Hobson - last evaluated back in 3/23/2022  Current outpatient therapy with Dr. SPENCER Beasley

## 2022-07-21 NOTE — ED ADULT TRIAGE NOTE - HEIGHT IN CM
Patient received a 300 mcg Rhogam injection in her right dorsogluteal with no reaction and a 0.5 mL TDap injection in her right dorsogluteal with no reaction per Dr. Link. She was also given a work excuse today for pregnancy related discomfort and needed to unwind and relax per. Dr. Link.    187.96

## 2022-07-21 NOTE — ED BEHAVIORAL HEALTH ASSESSMENT NOTE - SUMMARY
22yo M, domiciled with father, high school graduate, unemployed, non-caregiver, PPH of depression, borderline personality disorder, alcohol abuse, 3 prior psych hospitalizations, most recently in 2/26/2021-3/28/2021 at [facility] for cutting in the setting of alcohol intoxication, currently in outpatient txt with therapist Dr. Beasley and psychiatrist Dr. Darren Hobson, hx NSSIB and SA via asphyxiation w/plastic bag and cutting, no hx of aggressive or violent behavior, currently binge drinking alcohol, no history of detox/rehab or withdrawal symptoms, no legal issues who was BIB self by referral from therapist after making suicidal statements over the phone and cutting forearm.     Current presentation is similar to previous [facility] admissions, with patient presenting as dysphoric with increased alcohol intake, increased self-harm frequency, and acutely elevated suicidality. On exam, patient is guarded, limiting information with stated desire to leave the hospital. Patient denies any episode of active SI or plan, although per therapist collateral, patient had plans to overdose on pill. Additionally, patient was found to have 4cm laceration on right forearm requiring 7 sutures due to previous self-harm and was also found to have BAL of 303. Patient denies any MDD symptoms, although patient reports chronic feelings of emptiness, depressed mood, and reports functional impairment, consistent with BPD. Based on presentation, patient is at high risk of harm to self, considering intoxication, risky self-harm, and acute suicidality as per therapist collateral. Outpatient therapist and psychiatrist in agreement with plan to admit psychiatrically for safety and stabilization for medication titration.    RECOMMENDATIONS:   - Admitting on 9.39 involuntary status. No need for CO  - Start home effexor 75mg QD; Defer to inpatient psychiatry to decide on need for additional mood stabilizing medication  - PRN's: Trazodone 50mg QHS PRN insomnia, ativan 1mg PO/IM PRN agitation, benadryl 50mg PO/IM PRN agitation  - Start symptom trigger CIWA protocol for alcohol use although patient denies regular use. 22/M with MDD, complex PTSD and borderline personality disorder. has multiple past psych admissions, hx of poor compliance to meds and psych aftercare (has been missing out on scheduled appts with Dr Hobson); hx of NSSIB (cutting) and SA via asphyxiation w/plastic bag and cutting; with hx of binging alcohol as well as intermittent THC smoking.  Has no hx of detox/rehab; no documented withdrawal symptoms.  Today, presented to the ED BIB EMS after SW activated 911 as she had concerns re: Pt's SI.. SW made aware that Pt had recently cut self.  Psych was consulted for evaluation of SI/ SIB.     at this time, he endorses feeling "intermittently depressed" along with hopelessness/ helplessness/ worthlessness. He admits hesitantly that he recently resorted to cutting himself (using a razor blade) to the left forearm and neck. He denied that this was an act to end his life.. rather, aimed at "being able to feel pain".  collateral information obtained from his mother and therapist reportedly expressed serious concerns that the Pt has been progressively, drastically decompensating.  Decompensating behavior is in the form of increased alcohol intake, resorting back to self-harming, being more despondent and more hopeless.  Pt is indeed, at acutely elevated risk for suicidality.     On exam, he is noted to be guarded, evasive - continues to limit information, preoccupied with being discharged.  He attempts to minimize.  Pt currently denies any episode of active SI or plan, although per collateral information obtained from his therapist, Pt had previously expressed plan to cut self if considering towards consummating on the SI.  He reports chronic feelings of emptiness, dysphoria.  at times, there is functional impairment due to ongoing symptoms.      current psych symptoms likely a confluence of a primary affective/ anxiety disorders complicated by an axis II pathology along with alcohol/ cannabis use.  Pt has also been suspected to be poorly compliant to meds.  current symptoms have likely been precipitated and perpetuated by psychosocial stressors (of which Pt refuses to elaborate).  currently, is not exhibiting any signs/ symptoms suggestive of acute peter or psychosis. He is not delirious. no signs/ symptoms suggestive of impending withdrawal     at this time, the Pt is deemed at high risk of harm to self, considering he is severely affectively dysregulated, is impulsive, recently engaged in SIB; with poor coping skills.  said symptoms have caused to an extent, functional impairment.  Pt will need in-Pt psych admission aimed at stabilization and ensuring safety     RECOMMENDATIONS:   1. Admitting on 9.39 involuntary status (once medically cleared). No need for CO  2. restart home med: Zoloft at 50mg daily. titrate as appropriate. Defer to in-Pt treatment team re: need for initiation of dedicated mood stabilizer  3. PRN: Trazodone 50mg HS as off label for sleep disturbance  4. PRNs: Ativan 2mg PO/IM + benadryl 50mg IM q6Hrs PRN agitation/ anxiety (PO) and severe agitation (IM)  5. symptom trigger CIWA protocol for alcohol withdrawal  6. folic acid PO + MVI PO + thiamine PO daily

## 2022-07-21 NOTE — ED PROVIDER NOTE - CLINICAL SUMMARY MEDICAL DECISION MAKING FREE TEXT BOX
this is a 22 y.o male with a PMhx of Depression/suicidal ideations presented to the ED for psychiatry evaluation, patient was brought in to the ED for suicidal attempt last night. psychiatric evaluation-labs, ekg, psych consult

## 2022-07-21 NOTE — ED BEHAVIORAL HEALTH NOTE - BEHAVIORAL HEALTH NOTE
As per request of provider, writer contacted patient’s CELSO Cisneros from [facility] ([phone]) to obtain collateral information.  Writer left a voicemail requesting a return call. As per request of provider, writer contacted patient’s SW Amelia from [facility] ([phone]) to obtain collateral information.  Writer left a voicemail requesting a return call.  Patient’s CELSO Cisneros returned the call. SW reports that today was her monthly check in with the patient via phone. She reports that she activated EMS today due to the patient engaging in self harm by cutting. Patient shared with SW that he was cutting because he didn’t feel well. SW asked if he wanted to commit suicide but the patient didn’t respond . Patient reported to the SW that it was okay to call the ambulance. SW reports patient has a hx of Self harm. Patient has a hx of Borderline personality disorder, MDD and PTSD. Patient’s mother reports to the SW that he drinks often and uses marijuana.  SW unsure of patient’s baseline and unsure of any prior suicide attempts. SW reports that patient’s main stressor is being unemployed. SW working with patient to get SSI. SW reports patient has a hx of 3 hospitalizations at [facility] but was unsure of his last hospitalization. Mother told the SW that the patient had been engaging in self harm for a while and there were noticeable cuts. No medical problems reported and patient is covid vaccinated. Writer informed SW that patient will be admitted to [facility]. As per request of provider, writer contacted patient’s SW Amelia from [facility] ([phone]) to obtain collateral information.  Writer left a voicemail requesting a return call.  Patient’s SW Amelia returned the call. SW reports that today was her monthly check in with the patient via phone. She reports that she activated EMS today due to the patient engaging in self harm by cutting. Patient shared with SW that he was cutting because he didn’t feel well. SW asked if he wanted to commit suicide but the patient didn’t respond . Patient reported to the SW that it was okay to call the ambulance. SW reports patient has a hx of Self harm. Patient has a hx of Borderline personality disorder, MDD and PTSD. Patient’s mother reports to the SW that he drinks often and uses marijuana.  SW unsure of patient’s baseline and unsure of any prior suicide attempts. SW reports that patient’s main stressor is being unemployed. SW working with patient to get SSI. SW reports patient has a hx of 3 hospitalizations at [facility] but was unsure of his last hospitalization. Mother told the SW that the patient had been engaging in self harm for a while and there were noticeable cuts. No medical problems reported and patient is covid vaccinated. Writer informed SW that patient will be admitted to [facility].    Writer received a phone call from patient's mother Mitzi. Writer informed patient's mother Mitzi Aide ([phone]) that patient would be admitted to [facility].

## 2022-07-21 NOTE — ED BEHAVIORAL HEALTH ASSESSMENT NOTE - CURRENT MEDICATION
claims to be on Zoloft (he is unsure of the dosage) daily and Melatonin HS   - mother suspects that Pt has not been compliant with current meds

## 2022-07-21 NOTE — ED BEHAVIORAL HEALTH ASSESSMENT NOTE - OTHER
CVM guarded, minimizing symptoms Amelia ([phone]) who activated 911 due to SI concern therapist by hx: impaired distracted appeared as stated age, lamar, noted cuts over the left forearm and anterior neck region - no cellulitis/ no abcess; not actively bleeding; multiple old scars bilateral forearms continued familial relationship conflict, unemployment

## 2022-07-21 NOTE — ED BEHAVIORAL HEALTH NOTE - BEHAVIORAL HEALTH NOTE
COVID Exposure Screen- Patient  1.	*Have you had a COVID-19 test in the last 90 days?  (  ) Yes   ( X ) No   (  ) Unknown- Reason: _____  IF YES PROCEED TO QUESTION #2. IF NO OR UNKNOWN, PLEASE SKIP TO QUESTION #3.  2.	Date of test(s) and result(s): ________  3.	*Have you tested positive for COVID-19 antibodies? (  ) Yes   ( X ) No   (  ) Unknown- Reason: _____  IF YES PROCEED TO QUESTION #4. IF NO or UNKNOWN, PLEASE SKIP TO QUESTION #5.  4.	Date of positive antibody test: ________  5.	*Have you received 2 doses of the COVID-19 vaccine? ( X ) Yes   (  ) No   (  ) Unknown- Reason: _____   IF YES PROCEED TO QUESTION #6. IF NO or UNKNOWN, PLEASE SKIP TO QUESTION #7.  6.	Date of second dose: ________ completed Pfizer series last 2/2022; no booster dose received   7.	*In the past 10 days, have you been around anyone with a positive COVID-19 test?* (  ) Yes   ( X ) No   (  ) Unknown- Reason: ____  IF YES PROCEED TO QUESTION #8. IF NO or UNKNOWN, PLEASE SKIP TO QUESTION #13.  8.	Were you within 6 feet of them for at least 15 minutes? (  ) Yes   (  ) No   (  ) Unknown- Reason: _____  9.	Have you provided care for them? (  ) Yes   (  ) No   (  ) Unknown- Reason: ______  10.	Have you had direct physical contact with them (touched, hugged, or kissed them)? (  ) Yes   (  ) No    (  ) Unknown- Reason: _____  11.	Have you shared eating or drinking utensils with them? (  ) Yes   (  ) No    (  ) Unknown- Reason: ____  12.	Have they sneezed, coughed, or somehow gotten respiratory droplets on you? (  ) Yes   (  ) No    (  ) Unknown- Reason: ______  13.	*Have you been out of [place] within the past 10 days?* (  ) Yes   ( X ) No   (  ) Unknown- Reason: _____  IF YES PLEASE ANSWER THE FOLLOWING QUESTIONS:  14.	Which state/country have you been to? ______  15.	Were you there over 24 hours? (  ) Yes   (  ) No    (  ) Unknown- Reason: ______  16.	Date of return to [facility]: ______.

## 2022-07-22 LAB
COVID-19 SPIKE DOMAIN AB INTERP: POSITIVE
COVID-19 SPIKE DOMAIN ANTIBODY RESULT: >250 U/ML — HIGH
SARS-COV-2 IGG+IGM SERPL QL IA: >250 U/ML — HIGH
SARS-COV-2 IGG+IGM SERPL QL IA: POSITIVE

## 2022-07-22 PROCEDURE — 99232 SBSQ HOSP IP/OBS MODERATE 35: CPT

## 2022-07-22 RX ORDER — ARIPIPRAZOLE 15 MG/1
5 TABLET ORAL DAILY
Refills: 0 | Status: DISCONTINUED | OUTPATIENT
Start: 2022-07-23 | End: 2022-07-26

## 2022-07-22 RX ORDER — SERTRALINE 25 MG/1
75 TABLET, FILM COATED ORAL DAILY
Refills: 0 | Status: DISCONTINUED | OUTPATIENT
Start: 2022-07-24 | End: 2022-07-28

## 2022-07-22 RX ORDER — SERTRALINE 25 MG/1
50 TABLET, FILM COATED ORAL DAILY
Refills: 0 | Status: COMPLETED | OUTPATIENT
Start: 2022-07-23 | End: 2022-07-23

## 2022-07-22 RX ADMIN — Medication 1 TABLET(S): at 09:49

## 2022-07-22 RX ADMIN — Medication 100 MILLIGRAM(S): at 09:49

## 2022-07-22 RX ADMIN — Medication 1 MILLIGRAM(S): at 09:49

## 2022-07-22 RX ADMIN — Medication 2 MILLIGRAM(S): at 16:13

## 2022-07-22 RX ADMIN — SERTRALINE 50 MILLIGRAM(S): 25 TABLET, FILM COATED ORAL at 09:49

## 2022-07-22 NOTE — BH INPATIENT PSYCHIATRY PROGRESS NOTE - NSBHASSESSSUMMFT_PSY_ALL_CORE
Patient endorses having urinary retention and reports history of BPH. Patient reports having taken urecholine in the past for urinary retention. Writer contacted medicine who stated that the patient can be restarted on urecholine 10mg TID, monitor patient for dizziness/hypotension.     Patient is ill-appearing at this time, he endorses having significant abdominal and lumbar pain. He has h/o large abdominal hernia, patient has been having difficulty with urination, upon bladder scan he was noted to be retaining 1,100 mL's of urine. Patient also endorses difficulty with flatus and being constipated, possibly due to Clozapine titration, will hold Clozapine for now. Patient is to be transferred to [facility] emergency department for further medical stabilization.     Plan:  >Legal: 9.13  >Transfer patient to [facility] emergency department due to urinary retention (1,100 mL's) and significant abdominal pain.     >Psychiatric Meds:   - Continue Bupropion  mg tablet, once daily  - Hold Clozapine 175 mg tablet, at bedtime  - Hold Clozapine 50 mg tablet, once daily  - Continue Gabapentin 300 mg capsule, three times daily  - Continue Venlafaxine  mg tablet, once daily    >Medical Meds:  - Metoprolol succinate ER 25 mg tablet, at bedtime  - Pantoprazole 40 mg tablet, daily before breakfast  - Simvastatin 40 mg tablet, at bedtime   - Senna 2 tablets, at bedtime  - Urecholine 10 mg tablet, TID for urinary retention     PRN medications:  Ativan 2mg IM Q6HR PRN for agitation and anxiety.  Haldol 5mg IM Q6HR PRN for agitation.   Benadryl 50mg IM Q6HR PRN for agitation.     >Labs: Labs reviewed.    >Diet: Consistent Carbohydrate  >Social: milieu/structured therapy  >Treatment Interventions: Groups and Individual Therapy/CBT  >Dispo: Collateral and dispo planning pending further symptom and medication optimization     Plan:  >Legal: 9.13  >Transfer patient to [facility] emergency department due to urinary retention (1,100 mL's) and significant abdominal pain.     >Psychiatric Meds:   - Continue Bupropion  mg tablet, once daily  - Hold Clozapine 175 mg tablet, at bedtime  - Hold Clozapine 50 mg tablet, once daily  - Continue Gabapentin 300 mg capsule, three times daily  - Continue Venlafaxine  mg tablet, once daily    >Medical Meds:  - Metoprolol succinate ER 25 mg tablet, at bedtime  - Pantoprazole 40 mg tablet, daily before breakfast  - Simvastatin 40 mg tablet, at bedtime   - Senna 2 tablets, at bedtime  - Urecholine 10 mg tablet, TID for urinary retention     PRN medications:  Ativan 2mg IM Q6HR PRN for agitation and anxiety.  Haldol 5mg IM Q6HR PRN for agitation.   Benadryl 50mg IM Q6HR PRN for agitation.     >Labs: Labs reviewed.    >Diet: Consistent Carbohydrate  >Social: milieu/structured therapy  >Treatment Interventions: Groups and Individual Therapy/CBT  >Dispo: Collateral and dispo planning pending further symptom and medication optimization Patient is a 22 year old male, single, domiciled with father, and unemployed. He is not attending school. Pt has hx of MDD, complex PTSD and borderline personality disorder. With multiple past psych admissions, hx of self injurious behaviors via cutting and past hx of SA via asphyxiation using a plastic bag; hx of binge drinking alcohol as well as cannabis use. He presented to the ED BIB EMS as a referral from his SW due to SI as well as admitting to her that he had previously cut self.     Patient at this time remains to be depressed, although he is minimizing the severity of his current condition. Patient is on CIWA protocol, noted to be mildly tremulous at present. Will continue to titrate Zoloft for control of depressive symptoms.     Plan:  >Legal: 9.39  >Routine observation    >Psychiatric Meds:   - Increase Sertraline 75 mg tablet, once daily    >Medical Meds:  - Metoprolol succinate ER 25 mg tablet, at bedtime  - Pantoprazole 40 mg tablet, daily before breakfast      PRN medications:  Ativan 2mg IM Q6HR PRN for agitation and anxiety.  Haldol 5mg IM Q6HR PRN for agitation.   Benadryl 50mg IM Q6HR PRN for agitation.     >Labs: Labs reviewed.    >Diet: Consistent Carbohydrate  >Social: milieu/structured therapy  >Treatment Interventions: Groups and Individual Therapy/CBT  >Dispo: Collateral and dispo planning pending further symptom and medication optimization Patient is a 22 year old male, single, domiciled with father, and unemployed. He is not attending school. Pt has hx of MDD, complex PTSD and borderline personality disorder. With multiple past psych admissions, hx of self injurious behaviors via cutting and past hx of SA via asphyxiation using a plastic bag; hx of binge drinking alcohol as well as cannabis use. He presented to the ED BIB EMS as a referral from his SW due to SI as well as admitting to her that he had previously cut self.     Patient at this time remains to be depressed, although he is minimizing the severity of his current condition. Patient is on CIWA protocol, noted to be mildly tremulous at present. Will give one dose of Ativan 2 mg at this time. Will continue to titrate Zoloft for control of depressive symptoms.     Plan:  >Legal: 9.39  >Routine observation    >Psychiatric Meds:   - Increase Sertraline 75 mg tablet, once daily    >Medical Meds:  - Metoprolol succinate ER 25 mg tablet, at bedtime  - Pantoprazole 40 mg tablet, daily before breakfast      PRN medications:  Ativan 2mg IM Q6HR PRN for agitation and anxiety.  Haldol 5mg IM Q6HR PRN for agitation.   Benadryl 50mg IM Q6HR PRN for agitation.     >Labs: Labs reviewed.    >Diet: Consistent Carbohydrate  >Social: milieu/structured therapy  >Treatment Interventions: Groups and Individual Therapy/CBT  >Dispo: Collateral and dispo planning pending further symptom and medication optimization Patient is a 22 year old male, single, domiciled with father, and unemployed. He is not attending school. Pt has hx of MDD, complex PTSD and borderline personality disorder. With multiple past psych admissions, hx of self injurious behaviors via cutting and past hx of SA via asphyxiation using a plastic bag; hx of binge drinking alcohol as well as cannabis use. He presented to the ED BIB EMS as a referral from his SW due to SI as well as admitting to her that he had previously cut self.     Patient at this time remains to be depressed, although he is minimizing the severity of his current condition. Patient is on CIWA protocol, noted to be mildly tremulous at present. Will give one dose of Ativan 2 mg at this time. Will continue to titrate Zoloft for control of depressive symptoms.     Plan:  >Legal: 9.39  >Routine observation    >Psychiatric Meds:   - Increase Sertraline 75 mg tablet, once daily    >Medical Meds:  - Folic acid 1 mg tablet, daily  - Thiamine 100 mg tablet, daily  - Multivitamin tablet, daily      PRN medications:  Ativan 2mg IM Q6HR PRN for agitation and anxiety.  Benadryl 50mg IM Q6HR PRN for agitation.   Ativan 2 mg IM Q2H PRN as per CIWA protocol.    >Labs: Labs reviewed.    >Diet: Consistent Carbohydrate  >Social: milieu/structured therapy  >Treatment Interventions: Groups and Individual Therapy/CBT  >Dispo: Collateral and dispo planning pending further symptom and medication optimization

## 2022-07-22 NOTE — BH INPATIENT PSYCHIATRY ASSESSMENT NOTE - CURRENT MEDICATION
MEDICATIONS  (STANDING):  folic acid 1 milliGRAM(s) Oral daily  multivitamin 1 Tablet(s) Oral daily  sertraline 75 milliGRAM(s) Oral daily    MEDICATIONS  (PRN):  diphenhydrAMINE Injectable 50 milliGRAM(s) IntraMuscular once PRN severe agitation  LORazepam     Tablet 2 milliGRAM(s) Oral every 2 hours PRN CIWA score increase by 2 points and current CIWA score GREATER THAN 9  LORazepam     Tablet 2 milliGRAM(s) Oral every 6 hours PRN Agitation/ severe anxiety  LORazepam   Injectable 2 milliGRAM(s) IntraMuscular Once PRN severe agitation  traZODone 50 milliGRAM(s) Oral at bedtime PRN off label for sleep disturbances
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
## 2022-07-22 NOTE — PSYCHIATRIC REHAB INITIAL EVALUATION - NSBHALCSUBTREAT_PSY_ALL_CORE
It is suspected that patient has not been compliant with medication or treatment prior to current hospitalization.

## 2022-07-22 NOTE — PSYCHIATRIC REHAB INITIAL EVALUATION - NSBHPRRECOMMEND_PSY_ALL_CORE
Writer met with patient in order to orient patient to unit and briefly introduce patient to psychiatric rehabilitation staff and department function. Patient was provided with a copy of unit schedule. Patient is not a reliable historian, as patient minimizes events leading up to current hospitalization. Chart reports patient is currently admitted due to cutting self and admitting it to SW. Pt claims he has been feeling hopeless, helpless and worthless.  Patient has been experiencing social stressors - he refused to elaborate on what these stressors are. Patient and writer established a collaborative psychiatric rehabilitation goal. Writer encouraged patient to attend psychiatric rehabilitation groups and engage in treatment. Psychiatric rehabilitation staff will engage patient daily.

## 2022-07-22 NOTE — BH INPATIENT PSYCHIATRY ASSESSMENT NOTE - OTHER PAST PSYCHIATRIC HISTORY (INCLUDE DETAILS REGARDING ONSET, COURSE OF ILLNESS, INPATIENT/OUTPATIENT TREATMENT)
has multiple past in-patient hospitalizations (last [facility] admission in 3/2022 due to cutting behavior needing repair amidst alcohol intoxication)  - was found to have 4cm cut over right forearm. Laceration was irrigated and required 7 sutures. BAL was at 303  1 prior suicide attempt via asphyxiation whilst intoxicated, 1/2020  Current outpatient psychiatric treatment with Dr. ANA Hobson - last evaluated back in 3/23/2022  Current outpatient therapy with Dr. SPENCER Beasley

## 2022-07-22 NOTE — BH INPATIENT PSYCHIATRY ASSESSMENT NOTE - NSBHCHARTREVIEWVS_PSY_A_CORE FT
Vital Signs Last 24 Hrs  T(C): 35.9 (07-26-22 @ 14:43), Max: 36.3 (07-26-22 @ 06:41)  T(F): 96.7 (07-26-22 @ 14:43), Max: 97.3 (07-26-22 @ 06:41)  HR: --  BP: --  BP(mean): --  RR: --  SpO2: --    Orthostatic VS  07-26-22 @ 08:26  Lying BP: --/-- HR: --  Sitting BP: 123/71 HR: 79  Standing BP: 119/75 HR: 106  Site: upper left arm  Mode: electronic  Orthostatic VS  07-25-22 @ 08:41  Lying BP: --/-- HR: --  Sitting BP: 117/86 HR: 88  Standing BP: 120/71 HR: 93  Site: --  Mode: --  Orthostatic VS  07-24-22 @ 19:21  Lying BP: --/-- HR: --  Sitting BP: 151/93 HR: 69  Standing BP: 150/95 HR: 82  Site: --  Mode: --

## 2022-07-22 NOTE — BH SOCIAL WORK INITIAL PSYCHOSOCIAL EVALUATION - NSBHBARRIERS_PSY_ALL_CORE
Financial difficulties/Lack of insight/Low motivation/Non-compliant with treatment/Poor judgement Financial difficulties/Non-compliant with treatment/Poor judgement

## 2022-07-22 NOTE — BH SOCIAL WORK INITIAL PSYCHOSOCIAL EVALUATION - NSCMSPTSTRENGTHS_PSY_ALL_CORE
Compliance to treatment/Future/goal oriented/Interpersonal skills/Physically healthy Interpersonal skills/Physically healthy

## 2022-07-22 NOTE — BH INPATIENT PSYCHIATRY ASSESSMENT NOTE - NSTXDCOTHRGOAL_PSY_ALL_CORE
The patient will participate in treatment and discharge planning, report improved mood and coping skills, with no SI, or self harm behaviors.
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
## 2022-07-22 NOTE — BH SOCIAL WORK INITIAL PSYCHOSOCIAL EVALUATION - NSBHSAALC_PSY_A_CORE FT
Patient's mother reported that patient has recently been drinking for 48 hours.  Patient with history of binging but does not drink regularly.

## 2022-07-22 NOTE — BH INPATIENT PSYCHIATRY ASSESSMENT NOTE - NSBHMETABOLIC_PSY_ALL_CORE_FT
BMI: BMI (kg/m2): 22.1 (07-21-22 @ 19:03)  HbA1c: A1C with Estimated Average Glucose Result: 5.4 % (03-10-22 @ 09:41)    Glucose: POCT Blood Glucose.: 105 mg/dL (02-19-22 @ 11:40)    BP: --  Lipid Panel: Date/Time: 03-10-22 @ 09:41  Cholesterol, Serum: 210  Direct LDL: --  HDL Cholesterol, Serum: 73  Total Cholesterol/HDL Ration Measurement: --  Triglycerides, Serum: 97

## 2022-07-22 NOTE — BH INPATIENT PSYCHIATRY ASSESSMENT NOTE - DESCRIPTION
currently lives at home with father, uncle, grandmother and brothers. Completed HS. attended [employer] but was dismissed due to failing grades. Currently unemployed. 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 likes to write, play the guitar; listening to music; working out/ running, playing video games. no reported pets. no reported access to guns

## 2022-07-22 NOTE — BH SOCIAL WORK INITIAL PSYCHOSOCIAL EVALUATION - NSHIGHRISKBEHFT_PSY_ALL_CORE
Hx suicide attempt and self harm with recent history of cutting his left forearm and neck using a razor blade, hx alcohol abuse

## 2022-07-22 NOTE — BH INPATIENT PSYCHIATRY PROGRESS NOTE - NSBHFUPINTERVALHXFT_PSY_A_CORE
Patient is followed up for depression and self harm. Vital signs are stable. Chart, medications and labs reviewed, with no acute changes. Patient is discussed at length during morning brief.    Patient was seen and is evaluated at bedside. He reports having good sleep and appetite. He has been adherent to treatment plan and medications. Patient describes his mood as "okay." He states that he presented to the ER after his SW activated due to self inflicted lacerations to left forearm and depression. Lacerations are noted to be superificial, states that he used a razor, patient also noted with multiple scars of prior cutting on bilateral arms. Patient states that he was feeling depressed and had drank 3 cups of vodka the night before, which is what led to him cutting. He denies feeling anxious. He denies symptoms suggestive of peter and denies A/V hallucinations. Patient also denies having any current S/H ideations, plan or intent. Continue to provide therapeutic support as needed.

## 2022-07-22 NOTE — PSYCHIATRIC REHAB INITIAL EVALUATION - NSBHALCSUBCHOICE_PSY_ALL_CORE
Pt reports he drank 3 cups of vodka prior to being hospitalized. Actual amount is unclear, as patient is guarded and a poor historian.

## 2022-07-22 NOTE — BH SOCIAL WORK INITIAL PSYCHOSOCIAL EVALUATION - OTHER PAST PSYCHIATRIC HISTORY (INCLUDE DETAILS REGARDING ONSET, COURSE OF ILLNESS, INPATIENT/OUTPATIENT TREATMENT)
Pt is a 22 year old single male, domiciled with father, high school graduate, unemployed, non-caregiver, PPHx of depression, complex PTSD, borderline personality disorder, and alcohol abuse, 4 prior psych hospitalizations most recently in March 2022 due to cutting behavior needing repair amidst alcohol intoxication, hx of self injurious behaviors via cutting and past hx of SA via asphyxiation using a plastic bag; hx of binge drinking alcohol as well as cannabis use.  Today, presented to the ED BIB EMS as a referral from his SW due to SI as well as admitting to her that he had previously cut self.   Pt currently in outpatient txt with therapist Dr. Beasley and psychiatrist Dr. Darren Hobson.
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
## 2022-07-22 NOTE — BH INPATIENT PSYCHIATRY PROGRESS NOTE - CURRENT MEDICATION
MEDICATIONS  (STANDING):  folic acid 1 milliGRAM(s) Oral daily  multivitamin 1 Tablet(s) Oral daily  sertraline 50 milliGRAM(s) Oral daily  thiamine 100 milliGRAM(s) Oral daily    MEDICATIONS  (PRN):  diphenhydrAMINE Injectable 50 milliGRAM(s) IntraMuscular once PRN severe agitation  LORazepam     Tablet 2 milliGRAM(s) Oral every 2 hours PRN CIWA score increase by 2 points and current CIWA score GREATER THAN 9  LORazepam     Tablet 2 milliGRAM(s) Oral every 6 hours PRN Agitation/ severe anxiety  LORazepam   Injectable 2 milliGRAM(s) IntraMuscular Once PRN severe agitation  traZODone 50 milliGRAM(s) Oral at bedtime PRN off label for sleep disturbances

## 2022-07-23 PROCEDURE — 99232 SBSQ HOSP IP/OBS MODERATE 35: CPT

## 2022-07-23 RX ADMIN — ARIPIPRAZOLE 5 MILLIGRAM(S): 15 TABLET ORAL at 10:43

## 2022-07-23 RX ADMIN — SERTRALINE 50 MILLIGRAM(S): 25 TABLET, FILM COATED ORAL at 10:42

## 2022-07-23 RX ADMIN — Medication 1 MILLIGRAM(S): at 10:42

## 2022-07-23 RX ADMIN — Medication 100 MILLIGRAM(S): at 10:43

## 2022-07-23 RX ADMIN — Medication 50 MILLIGRAM(S): at 00:50

## 2022-07-23 RX ADMIN — Medication 1 TABLET(S): at 10:40

## 2022-07-23 NOTE — BH INPATIENT PSYCHIATRY PROGRESS NOTE - CURRENT MEDICATION
MEDICATIONS  (STANDING):  ARIPiprazole 5 milliGRAM(s) Oral daily  folic acid 1 milliGRAM(s) Oral daily  multivitamin 1 Tablet(s) Oral daily  sertraline 50 milliGRAM(s) Oral daily  thiamine 100 milliGRAM(s) Oral daily    MEDICATIONS  (PRN):  diphenhydrAMINE Injectable 50 milliGRAM(s) IntraMuscular once PRN severe agitation  LORazepam     Tablet 2 milliGRAM(s) Oral every 2 hours PRN CIWA score increase by 2 points and current CIWA score GREATER THAN 9  LORazepam     Tablet 2 milliGRAM(s) Oral every 6 hours PRN Agitation/ severe anxiety  LORazepam   Injectable 2 milliGRAM(s) IntraMuscular Once PRN severe agitation  traZODone 50 milliGRAM(s) Oral at bedtime PRN off label for sleep disturbances

## 2022-07-23 NOTE — BH INPATIENT PSYCHIATRY PROGRESS NOTE - NSBHASSESSSUMMFT_PSY_ALL_CORE
Patient is a 22 year old male, single, domiciled with father, and unemployed. He is not attending school. Pt has hx of MDD, complex PTSD and borderline personality disorder. With multiple past psych admissions, hx of self injurious behaviors via cutting and past hx of SA via asphyxiation using a plastic bag; hx of binge drinking alcohol as well as cannabis use. He presented to the ED BIB EMS as a referral from his SW due to SI as well as admitting to her that he had previously cut self.     Patient at this time remains to be depressed, although he is minimizing the severity of his current condition. Patient is on CIWA protocol, noted to be mildly tremulous at present. Will give one dose of Ativan 2 mg at this time. Will continue to titrate Zoloft for control of depressive symptoms.     Plan:  >Legal: 9.39  >Routine observation    >Psychiatric Meds:   - Increase Sertraline 75 mg tablet, once daily    >Medical Meds:  - Folic acid 1 mg tablet, daily  - Thiamine 100 mg tablet, daily  - Multivitamin tablet, daily      PRN medications:  Ativan 2mg IM Q6HR PRN for agitation and anxiety.  Benadryl 50mg IM Q6HR PRN for agitation.   Ativan 2 mg IM Q2H PRN as per CIWA protocol.    >Labs: Labs reviewed.    >Diet: Consistent Carbohydrate  >Social: milieu/structured therapy  >Treatment Interventions: Groups and Individual Therapy/CBT  >Dispo: Collateral and dispo planning pending further symptom and medication optimization

## 2022-07-23 NOTE — BH INPATIENT PSYCHIATRY PROGRESS NOTE - NSBHFUPINTERVALHXFT_PSY_A_CORE
Patient is followed up for MDD, admitted for depression and complex PTSD and borderline personality disorder. Prior to admission pt reported recent SIB by way of cutting forearm and neck (denied SA).  Pt presents with multiple scars on BL arms from past SIB.  Chart, medications and labs reviewed, no acute findings.  Patient is discussed with nursing staff. No significant overnight issues.  Patient remains compliant with all standing medication no SE reported or observed.  Started Abilify 5mg today. Given  prn in past 24hrs. Patient reports eating and sleeping well.    Patient reports feeling “ok” Some minimal changes as of today. Endorses chronic dysphoric mood. Today reports some alleviation in mood, slight decrease in depressed mood, manageable level of anxiety. Denies SI/SIB/HI, no plan or intent.  Patient remains isolated to self, mostly in his room, is visible for his needs. Minimal interaction with peers. ADL fair. No overt psychotic trend, no peter, paranoia. Denies AVH at this time. Patient offers no complaints. No behavioral concerns. No acute medical concerns, VSS. Continue to provide therapeutic support.    Patient is followed up for MDD, admitted for depression and complex PTSD and borderline personality disorder. Prior to admission pt reported recent SIB by way of cutting forearm and neck (denied SA).  Pt presents with multiple scars on BL arms from past SIB.  Chart, medications and labs reviewed, no acute findings.  Patient is discussed with nursing staff. No significant overnight issues.  Patient remains compliant with all standing medication no SE reported or observed.  Started Abilify 5mg today. Given  prn in past 24hrs. Patient reports eating and sleeping well.  Denies S/d sx, CIWA 0.  Patient reports feeling “ok” Some minimal changes as of today. Endorses chronic dysphoric mood. Today reports some alleviation in mood, slight decrease in depressed mood, manageable level of anxiety. Denies SI/SIB/HI, no plan or intent.  Patient remains isolated to self, mostly in his room, is visible for his needs. Minimal interaction with peers. ADL fair. No overt psychotic trend, no peter, paranoia. Denies AVH at this time. Patient offers no complaints. No behavioral concerns. No acute medical concerns, VSS. Continue to provide therapeutic support.

## 2022-07-24 PROCEDURE — 99232 SBSQ HOSP IP/OBS MODERATE 35: CPT

## 2022-07-24 RX ADMIN — ARIPIPRAZOLE 5 MILLIGRAM(S): 15 TABLET ORAL at 08:29

## 2022-07-24 RX ADMIN — SERTRALINE 75 MILLIGRAM(S): 25 TABLET, FILM COATED ORAL at 08:29

## 2022-07-24 RX ADMIN — Medication 1 TABLET(S): at 08:29

## 2022-07-24 RX ADMIN — Medication 1 MILLIGRAM(S): at 08:30

## 2022-07-24 RX ADMIN — Medication 100 MILLIGRAM(S): at 08:29

## 2022-07-24 NOTE — BH INPATIENT PSYCHIATRY PROGRESS NOTE - CURRENT MEDICATION
MEDICATIONS  (STANDING):  ARIPiprazole 5 milliGRAM(s) Oral daily  folic acid 1 milliGRAM(s) Oral daily  multivitamin 1 Tablet(s) Oral daily  sertraline 75 milliGRAM(s) Oral daily    MEDICATIONS  (PRN):  diphenhydrAMINE Injectable 50 milliGRAM(s) IntraMuscular once PRN severe agitation  LORazepam     Tablet 2 milliGRAM(s) Oral every 2 hours PRN CIWA score increase by 2 points and current CIWA score GREATER THAN 9  LORazepam     Tablet 2 milliGRAM(s) Oral every 6 hours PRN Agitation/ severe anxiety  LORazepam   Injectable 2 milliGRAM(s) IntraMuscular Once PRN severe agitation  traZODone 50 milliGRAM(s) Oral at bedtime PRN off label for sleep disturbances

## 2022-07-24 NOTE — BH INPATIENT PSYCHIATRY PROGRESS NOTE - NSBHFUPINTERVALHXFT_PSY_A_CORE
Patient is followed up for MDD, admitted for depression and complex PTSD and borderline personality disorder. Prior to admission pt reported recent SIB by way of cutting forearm and neck (denied SA).  Pt presents with multiple scars on BL arms from past SIB.  Chart, medications and labs reviewed, no acute findings.  Patient is discussed with nursing staff. No significant overnight issues.  Patient remains compliant with all standing medication no SE reported or observed.  Started Sertraline 75mg today. Patient reports eating and sleeping well.    Patient reports feeling “ok”  Today reports some alleviation in mood, slight decrease in depressed mood, manageable level of anxiety. Denies SI/SIB/HI, no plan or intent.  Patient remains isolated to self, mostly in his room, is visible for his needs. ADL fair. No overt psychotic trend, no peter, paranoia. Denies AVH at this time. Patient offers no complaints. No behavioral concerns. No acute medical concerns, VSS. Continue to provide therapeutic support.

## 2022-07-25 PROCEDURE — 99232 SBSQ HOSP IP/OBS MODERATE 35: CPT

## 2022-07-25 RX ADMIN — Medication 1 TABLET(S): at 09:41

## 2022-07-25 RX ADMIN — ARIPIPRAZOLE 5 MILLIGRAM(S): 15 TABLET ORAL at 09:40

## 2022-07-25 RX ADMIN — Medication 50 MILLIGRAM(S): at 00:10

## 2022-07-25 RX ADMIN — SERTRALINE 75 MILLIGRAM(S): 25 TABLET, FILM COATED ORAL at 09:41

## 2022-07-25 RX ADMIN — Medication 1 MILLIGRAM(S): at 09:41

## 2022-07-25 NOTE — BH INPATIENT PSYCHIATRY PROGRESS NOTE - NSBHASSESSSUMMFT_PSY_ALL_CORE
Patient is a 22 year old male, single, domiciled with father, and unemployed. He is not attending school. Pt has hx of MDD, complex PTSD and borderline personality disorder. With multiple past psych admissions, hx of self injurious behaviors via cutting and past hx of SA via asphyxiation using a plastic bag; hx of binge drinking alcohol as well as cannabis use. He presented to the ED BIB EMS as a referral from his SW due to SI as well as admitting to her that he had previously cut self.     Patient at this time remains to be depressed, although he is minimizing the severity of his current condition. Will continue to titrate Zoloft for control of depressive symptoms.     Plan:  >Legal: 9.39  >Routine observation    >Psychiatric Meds:   - Continue Sertraline 75 mg tablet, once daily  - Continue Abilify 5 mg tablet, daily    >Medical Meds:  - Folic acid 1 mg tablet, daily  - Thiamine 100 mg tablet, daily  - Multivitamin tablet, daily      PRN medications:  Ativan 2mg IM Q6HR PRN for agitation and anxiety.  Benadryl 50mg IM Q6HR PRN for agitation.   Ativan 2 mg IM Q2H PRN as per [facility] protocol.    >Labs: Labs reviewed.    >Diet: Consistent Carbohydrate  >Social: milieu/structured therapy  >Treatment Interventions: Groups and Individual Therapy/CBT  >Dispo: Collateral and dispo planning pending further symptom and medication optimization

## 2022-07-25 NOTE — BH INPATIENT PSYCHIATRY PROGRESS NOTE - NSBHFUPINTERVALHXFT_PSY_A_CORE
Patient is followed up for MDD, admitted for depression and complex PTSD and borderline personality disorder. Prior to admission pt reported recent SIB by way of cutting forearm and neck (denied SA). Pt presents with multiple scars on BL arms from past SIB.  Chart, medications and labs reviewed, no acute findings.  Patient is discussed with nursing staff. No significant overnight issues. Patient remains compliant with all standing medication no SE reported or observed. Patient reports eating and sleeping well.    Patient was seen and is evaluated in the interview room. Patient reports feeling “okay”. He continues to report some alleviation in mood, less depressed mood, manageable level of anxiety. Denies SI/SIB/HI, no plan or intent.  Patient remains isolated to self, mostly in his room, is visible for his needs. ADL fair. No overt psychosis, no peter, paranoia. Denies AVH at this time. Patient offers no complaints, no behavioral concerns. No acute medical concerns, VSS. Continue to provide therapeutic support.

## 2022-07-26 PROCEDURE — 99232 SBSQ HOSP IP/OBS MODERATE 35: CPT

## 2022-07-26 RX ORDER — ARIPIPRAZOLE 15 MG/1
10 TABLET ORAL DAILY
Refills: 0 | Status: DISCONTINUED | OUTPATIENT
Start: 2022-07-27 | End: 2022-08-01

## 2022-07-26 RX ADMIN — Medication 1 MILLIGRAM(S): at 08:42

## 2022-07-26 RX ADMIN — Medication 1 TABLET(S): at 08:42

## 2022-07-26 RX ADMIN — ARIPIPRAZOLE 5 MILLIGRAM(S): 15 TABLET ORAL at 08:42

## 2022-07-26 RX ADMIN — Medication 50 MILLIGRAM(S): at 00:11

## 2022-07-26 RX ADMIN — SERTRALINE 75 MILLIGRAM(S): 25 TABLET, FILM COATED ORAL at 08:42

## 2022-07-26 NOTE — BH PSYCHOLOGY - CLINICIAN PSYCHOTHERAPY NOTE - NSTXDCOTHRGOAL_PSY_ALL_CORE
The patient will participate in treatment and discharge planning, report improved mood and coping skills, with no SI, or self harm behaviors.
The patient will participate in treatment and discharge planning, report improved mood and coping skills, with no SI, or self harm behaviors.

## 2022-07-26 NOTE — BH PSYCHOLOGY - CLINICIAN PSYCHOTHERAPY NOTE - TOKEN PULL-DIAGNOSIS
Primary Diagnosis:  MDD (major depressive disorder) [F32.9]        Problem Dx:   Cannabis abuse [F12.10]      PTSD (post-traumatic stress disorder) [F43.10]      Alcohol abuse [F10.10]      Borderline personality disorder [F60.3]      MDD (major depressive disorder) [F32.9]      
Primary Diagnosis:  MDD (major depressive disorder) [F32.9]        Problem Dx:   Cannabis abuse [F12.10]      PTSD (post-traumatic stress disorder) [F43.10]      Alcohol abuse [F10.10]      Borderline personality disorder [F60.3]      MDD (major depressive disorder) [F32.9]

## 2022-07-26 NOTE — BH INPATIENT PSYCHIATRY PROGRESS NOTE - NSBHFUPINTERVALHXFT_PSY_A_CORE
Patient is followed up for MDD, admitted for depression and complex PTSD and borderline personality disorder. Prior to admission pt reported recent SIB by way of cutting forearm and neck (denied SA). Pt presents with multiple scars on BL arms from past SIB. Chart, medications and labs reviewed, no acute findings.  Patient is discussed with nursing staff. No significant overnight issues. Patient remains compliant with all standing medication no SE reported or observed. Patient reports eating and sleeping well.    Patient was seen and is evaluated in the interview room. Patient reports feeling “okay”. He continues to report some alleviation in mood, less depressed mood, manageable level of anxiety. Patient has been visible in the day room and is engaging in groups. Denies SI/SIB/HI, no plan or intent. ADL fair. No overt psychosis, no peter, paranoia. Denies AVH at this time. Patient offers no complaints, no behavioral concerns. No acute medical concerns, VSS. Continue to provide therapeutic support.

## 2022-07-26 NOTE — BH INPATIENT PSYCHIATRY PROGRESS NOTE - NSBHASSESSSUMMFT_PSY_ALL_CORE
Patient is a 22 year old male, single, domiciled with father, and unemployed. He is not attending school. Pt has hx of MDD, complex PTSD and borderline personality disorder. With multiple past psych admissions, hx of self injurious behaviors via cutting and past hx of SA via asphyxiation using a plastic bag; hx of binge drinking alcohol as well as cannabis use. He presented to the ED BIB EMS as a referral from his SW due to SI as well as admitting to her that he had previously cut self.     Patient at this time remains to be mildly depressed, although he is minimizing the severity of his current condition/actions. Will continue titration of Zoloft for control of depressive symptoms and augment with Abilify.    Plan:  >Legal: 9.39  >Routine observation    >Psychiatric Meds:   - Continue Sertraline 75 mg tablet, once daily  - Increase Abilify 10 mg tablet, daily    >Medical Meds:  - Folic acid 1 mg tablet, daily  - Thiamine 100 mg tablet, daily  - Multivitamin tablet, daily      PRN medications:  Ativan 2mg IM Q6HR PRN for agitation and anxiety.  Benadryl 50mg IM Q6HR PRN for agitation.   Ativan 2 mg IM Q2H PRN as per CIWA protocol.    >Labs: Labs reviewed.    >Diet: Consistent Carbohydrate  >Social: milieu/structured therapy  >Treatment Interventions: Groups and Individual Therapy/CBT  >Dispo: Collateral and dispo planning pending further symptom and medication optimization

## 2022-07-26 NOTE — BH PSYCHOLOGY - CLINICIAN PSYCHOTHERAPY NOTE - NSBHPSYCHOLINT_PSY_A_CORE
Cognitive/behavioral therapy/Supported coping skills/Supportive therapy/other...
Cognitive/behavioral therapy/Supportive therapy/other...

## 2022-07-26 NOTE — BH PSYCHOLOGY - CLINICIAN PSYCHOTHERAPY NOTE - NSBHPSYCHOLNARRATIVE_PSY_A_CORE FT
Writer and patient wore masks during the session due to COVID precautions. Pt was alert and appeared to have good hygiene and grooming. Pt denied any current suicidal ideation, intent, or plan, and did not endorse any homicidal ideation. Pt reported "fine" mood and displayed euthymic affect. Speech within normal limits. Thought processes linear and goal directed. Pt denied any auditory or visual hallucinations. Oriented x3. Pt demonstrated some insight and judgment, but appeared to be minimizing severity of symptoms.     Pt was guarded about specific circumstances leading to hospitalization, citing vague explanations such as "stress" and "past trauma." When prompted to consider alternative coping strategies for future, pt stated that he is usually able to cope effectively and is uncertain as to why he sometimes resorts to NSSIB and alcohol. Writer encouraged pt to explore this question with outpatient therapist, who he finds very effective and helpful. Pt remains frustrated regarding his discharge date. Pt stated that he is already familiar with most therapy topics.
Writer and patient wore masks during the session due to COVID precautions. Pt was alert and appeared to have good hygiene and grooming. Pt denied any current suicidal ideation, intent, or plan, and did not endorse any homicidal ideation. Pt reported "okay" mood and displayed euthymic affect. Speech within normal limits. Thought processes linear and goal directed. Pt denied any auditory or visual hallucinations. Oriented x3. Parameters of treatment and limitations of confidentiality were discussed. Pt demonstrated good insight and judgment.     Writer provided orientation to unit, which included welcoming pt, explaining purpose of psychiatric unit, and assessing pt's goals for time on unit. Pt was mainly focused on discharge and expressed disappointment about being here, despite clarifying that he "understood why" he was hospitalized. Writer and pt briefly explored circumstances leading to hospitalization, including NSSIB, alcohol use, an argument with pt's mother, depressive symptoms, and trauma hx. Pt inquired about factors that contributed to discharge decision, which writer reviewed.

## 2022-07-27 PROCEDURE — 99232 SBSQ HOSP IP/OBS MODERATE 35: CPT

## 2022-07-27 RX ADMIN — Medication 50 MILLIGRAM(S): at 00:54

## 2022-07-27 RX ADMIN — Medication 1 MILLIGRAM(S): at 08:27

## 2022-07-27 RX ADMIN — SERTRALINE 75 MILLIGRAM(S): 25 TABLET, FILM COATED ORAL at 08:27

## 2022-07-27 RX ADMIN — Medication 1 TABLET(S): at 08:27

## 2022-07-27 RX ADMIN — ARIPIPRAZOLE 10 MILLIGRAM(S): 15 TABLET ORAL at 08:27

## 2022-07-27 NOTE — BH DISCHARGE NOTE NURSING/SOCIAL WORK/PSYCH REHAB - PATIENT PORTAL LINK FT
You can access the FollowMyHealth Patient Portal offered by [facility] by registering at the following website: [email or web address]. By joining [facility]’s FollowMyHealth portal, you will also be able to view your health information using other applications (apps) compatible with our system.

## 2022-07-27 NOTE — BH DISCHARGE NOTE NURSING/SOCIAL WORK/PSYCH REHAB - DISCHARGE INSTRUCTIONS AFTERCARE APPOINTMENTS
In order to check the location, date, or time of your aftercare appointment, please refer to your Discharge Instructions Document given to you upon leaving the hospital.  If you have lost the instructions please call [phone]

## 2022-07-27 NOTE — BH INPATIENT PSYCHIATRY PROGRESS NOTE - NSBHASSESSSUMMFT_PSY_ALL_CORE
Patient is a 22 year old male, single, domiciled with father, and unemployed. He is not attending school. Pt has hx of MDD, complex PTSD and borderline personality disorder. With multiple past psych admissions, hx of self injurious behaviors via cutting and past hx of SA via asphyxiation using a plastic bag; hx of binge drinking alcohol as well as cannabis use. He presented to the ED BIB EMS as a referral from his SW due to SI as well as admitting to her that he had previously cut self.     Patient at this time remains to be mildly depressed, although he is minimizing the severity of his current condition/actions. He denies SI/SIB/HI, no plan or intent. Will continue titration of Zoloft for control of depressive symptoms and augment with Abilify.     Plan:  >Legal: 9.39  >Routine observation    >Psychiatric Meds:   - Continue Sertraline 75 mg tablet, once daily  - Continue Abilify 10 mg tablet, daily    >Medical Meds:  - Folic acid 1 mg tablet, daily  - Thiamine 100 mg tablet, daily  - Multivitamin tablet, daily    PRN medications:  Ativan 2mg IM Q6HR PRN for agitation and anxiety.  Benadryl 50mg IM Q6HR PRN for agitation.   Ativan 2 mg IM Q2H PRN as per [facility] protocol.    >Labs: Labs reviewed.    >Diet: Consistent Carbohydrate  >Social: milieu/structured therapy  >Treatment Interventions: Groups and Individual Therapy/CBT  >Dispo: Collateral and dispo planning pending further symptom and medication optimization

## 2022-07-27 NOTE — BH DISCHARGE NOTE NURSING/SOCIAL WORK/PSYCH REHAB - NSDCPRGOAL_PSY_ALL_CORE
Pt made progress towards his discharge. As per team, pt has verbalized his coping skills including playing guitar, reading, and poetry to manage symptom of depression.  During this hospitalization, pt was observed to utilize other coping skills including playing board game with others, journaling, using stress ball to manage symptoms. Pt currently denies SI, HI, AH and VH.  Pt has demonstrated daily compliance with medication/treatment during this hospitalization. During this hospitalization, pt attended 90% of groups. During the group session, pt was able to tolerate group structure, actively participated with relevant feedback. Pt was visible on the unit, interacts well with others. Pt maintained fair ADLs. Pt has participated in his safety plan.

## 2022-07-27 NOTE — BH DISCHARGE NOTE NURSING/SOCIAL WORK/PSYCH REHAB - NSDCBELONGINGSDISPO_PSY_ALL_CORE
[Heartburn] : denies heartburn [Nausea] : denies nausea [Vomiting] : denies vomiting [Diarrhea] : denies diarrhea [Constipation] : denies constipation [Yellow Skin Or Eyes (Jaundice)] : denies jaundice [Abdominal Pain] : denies abdominal pain [Abdominal Swelling] : denies abdominal swelling [Rectal Pain] : denies rectal pain [de-identified] : INGRID EVANS is a 47 year old female presenting today for colon cancer screening. She has never had a colonoscopy before. She has no family history of colon cancer or colon polyps. She moves her bowels regularly. She  denies any nausea, vomiting, diarrhea, constipation, hematemesis, hematochezia, abdominal pain, bloating, unintended weight loss, decreased appetite, dysphagia or odynophagia. Her only medical history is DM2 and a partial hysterectomy. She does have an umbilical hernia which is being evaluated by Dr. Esquivel. She only takes OTC multivitamins. Recent labs were reviewed and are unremarkable. Returned to patient

## 2022-07-27 NOTE — BH INPATIENT PSYCHIATRY PROGRESS NOTE - NSBHFUPINTERVALHXFT_PSY_A_CORE
Patient is followed up for MDD, admitted for depression and complex PTSD and borderline personality disorder. Prior to admission pt reported recent SIB by way of cutting forearm and neck (denied SA). Pt presents with multiple scars on BL arms from past SIB. Chart, medications and labs reviewed, no acute findings.  Patient is discussed with nursing staff. No significant overnight issues. Patient remains compliant with all standing medication no SE reported or observed. Patient reports eating and sleeping well.    Patient was seen and is evaluated in the dayroom. Patient reports feeling “okay”. He continues to report some alleviation in mood, is feeling less depressed, currently manageable level of anxiety. Patient has been visible in the day room and is observed engaging in groups. Denies SI/SIB/HI, no plan or intent. ADL fair. No overt psychosis, no peter, paranoia. Denies AVH at this time. Patient offers no complaints, no behavioral concerns. No acute medical concerns, VSS. Continue to provide therapeutic support.

## 2022-07-27 NOTE — BH INPATIENT PSYCHIATRY PROGRESS NOTE - CURRENT MEDICATION
MEDICATIONS  (STANDING):  ARIPiprazole 10 milliGRAM(s) Oral daily  folic acid 1 milliGRAM(s) Oral daily  multivitamin 1 Tablet(s) Oral daily  sertraline 75 milliGRAM(s) Oral daily    MEDICATIONS  (PRN):  diphenhydrAMINE Injectable 50 milliGRAM(s) IntraMuscular once PRN severe agitation  LORazepam     Tablet 2 milliGRAM(s) Oral every 2 hours PRN CIWA score increase by 2 points and current CIWA score GREATER THAN 9  LORazepam     Tablet 2 milliGRAM(s) Oral every 6 hours PRN Agitation/ severe anxiety  LORazepam   Injectable 2 milliGRAM(s) IntraMuscular Once PRN severe agitation  traZODone 50 milliGRAM(s) Oral at bedtime PRN off label for sleep disturbances

## 2022-07-27 NOTE — BH DISCHARGE NOTE NURSING/SOCIAL WORK/PSYCH REHAB - MODE OF TRANSPORTATION
Consent: The patient's consent was obtained including but not limited to risks of crusting, scabbing, blistering, scarring, darker or lighter pigmentary change, recurrence, incomplete removal and infection.
Detail Level: Zone
Render Note In Bullet Format When Appropriate: No
Duration Of Freeze Thaw-Cycle (Seconds): 0
Post-Care Instructions: I reviewed with the patient in detail post-care instructions. Patient is to wear sunprotection, and avoid picking at any of the treated lesions. Pt may apply Vaseline to crusted or scabbing areas.
Medicaid Transportation

## 2022-07-27 NOTE — BH DISCHARGE NOTE NURSING/SOCIAL WORK/PSYCH REHAB - NSDCPRRECOMMEND_PSY_ALL_CORE
Please follow up your appointment with your therapist Katharina Beasley at [facility] on 8/2/22 at 11:00 AM.

## 2022-07-27 NOTE — BH DISCHARGE NOTE NURSING/SOCIAL WORK/PSYCH REHAB - NSDCADDINFO1FT_PSY_ALL_CORE
Please note this is a virtual telehealth appointment please log in 15 minutes prior to appointment. An email will be sent with further instructions.

## 2022-07-28 PROCEDURE — 99239 HOSP IP/OBS DSCHRG MGMT >30: CPT

## 2022-07-28 PROCEDURE — 90853 GROUP PSYCHOTHERAPY: CPT

## 2022-07-28 RX ORDER — SERTRALINE 25 MG/1
100 TABLET, FILM COATED ORAL DAILY
Refills: 0 | Status: DISCONTINUED | OUTPATIENT
Start: 2022-07-29 | End: 2022-08-01

## 2022-07-28 RX ADMIN — Medication 1 MILLIGRAM(S): at 08:35

## 2022-07-28 RX ADMIN — Medication 1 TABLET(S): at 08:35

## 2022-07-28 RX ADMIN — Medication 50 MILLIGRAM(S): at 21:59

## 2022-07-28 RX ADMIN — SERTRALINE 75 MILLIGRAM(S): 25 TABLET, FILM COATED ORAL at 08:35

## 2022-07-28 RX ADMIN — ARIPIPRAZOLE 10 MILLIGRAM(S): 15 TABLET ORAL at 08:34

## 2022-07-28 NOTE — BH INPATIENT PSYCHIATRY PROGRESS NOTE - NSBHFUPINTERVALHXFT_PSY_A_CORE
Patient is followed up for MDD, admitted for depression and complex PTSD and borderline personality disorder. Prior to admission pt reported recent SIB by way of cutting forearm and neck (denied SA). Pt presents with multiple scars on BL arms from past SIB. Chart, medications and labs reviewed, no acute findings.  Patient is discussed with nursing staff. No significant overnight issues. Patient remains compliant with all standing medication no SE reported or observed. Patient reports eating and sleeping well.    Patient was seen and is evaluated in the dayroom. Patient reports feeling “okay”. He reports feeling less depressed, denies anxiety or any other mood disturbances. Patient has been visible in the day room and is observed engaging in groups. Denies SI/SIB/HI, no plan or intent. ADL fair. No overt psychosis, no peter, paranoia. Denies AVH at this time. Patient offers no complaints, no behavioral concerns. No acute medical concerns, VSS. Continue to provide therapeutic support.    Patient is followed up for MDD, admitted for depression and complex PTSD and borderline personality disorder. Prior to admission pt reported recent SIB by way of cutting forearm and neck (denied SA). Pt presents with multiple scars on BL arms from past SIB. Chart, medications and labs reviewed, no acute findings.  Patient is discussed with nursing staff. No significant overnight issues. Patient remains compliant with all standing medication no SE reported or observed. Patient reports eating and sleeping well.    Patient was seen and is evaluated in the dayroom. Patient reports feeling “okay”. He reports feeling less depressed, denies anxiety or any other mood disturbances. Patient has been visible in the day room and is observed engaging in groups. Denies SI/SIB/HI, no plan or intent. ADL fair. No overt psychosis, no peter, paranoia. Denies AVH at this time. Patient offers no complaints, no behavioral concerns at this time. No acute medical concerns, VSS. Continue to provide therapeutic support.

## 2022-07-28 NOTE — BH PSYCHOLOGY - GROUP THERAPY NOTE - TOKEN PULL-DIAGNOSIS
Primary Diagnosis:  MDD (major depressive disorder) [F32.9]        Problem Dx:   Cannabis abuse [F12.10]      PTSD (post-traumatic stress disorder) [F43.10]      Alcohol abuse [F10.10]      Borderline personality disorder [F60.3]      MDD (major depressive disorder) [F32.9]

## 2022-07-28 NOTE — BH INPATIENT PSYCHIATRY PROGRESS NOTE - NSBHASSESSSUMMFT_PSY_ALL_CORE
Patient is a 22 year old male, single, domiciled with father, and unemployed. He is not attending school. Pt has hx of MDD, complex PTSD and borderline personality disorder. With multiple past psych admissions, hx of self injurious behaviors via cutting and past hx of SA via asphyxiation using a plastic bag; hx of binge drinking alcohol as well as cannabis use. He presented to the ED BIB EMS as a referral from his SW due to SI as well as admitting to her that he had previously cut self.     Patient at this time remains to be mildly depressed, although he is minimizing the severity of his current condition/actions. He denies SI/SIB/HI, no plan or intent. Will continue titration of Zoloft for control of depressive symptoms and augment with Abilify.     Plan:  >Legal: 9.39  >Routine observation    >Psychiatric Meds:   - Increase Sertraline 100 mg tablet, daily  - Continue Abilify 10 mg tablet, daily    >Medical Meds:  - Folic acid 1 mg tablet, daily  - Thiamine 100 mg tablet, daily  - Multivitamin tablet, daily    PRN medications:  Ativan 2mg IM Q6HR PRN for agitation and anxiety.  Benadryl 50mg IM Q6HR PRN for agitation.   Ativan 2 mg IM Q2H PRN as per [facility] protocol.    >Labs: Labs reviewed.    >Diet: Consistent Carbohydrate  >Social: milieu/structured therapy  >Treatment Interventions: Groups and Individual Therapy/CBT  >Dispo: Collateral and dispo planning pending further symptom and medication optimization

## 2022-07-28 NOTE — BH INPATIENT PSYCHIATRY PROGRESS NOTE - CURRENT MEDICATION
MEDICATIONS  (STANDING):  ARIPiprazole 10 milliGRAM(s) Oral daily  sertraline 75 milliGRAM(s) Oral daily    MEDICATIONS  (PRN):  diphenhydrAMINE Injectable 50 milliGRAM(s) IntraMuscular once PRN severe agitation  LORazepam     Tablet 2 milliGRAM(s) Oral every 2 hours PRN CIWA score increase by 2 points and current CIWA score GREATER THAN 9  LORazepam     Tablet 2 milliGRAM(s) Oral every 6 hours PRN Agitation/ severe anxiety  LORazepam   Injectable 2 milliGRAM(s) IntraMuscular Once PRN severe agitation  traZODone 50 milliGRAM(s) Oral at bedtime PRN off label for sleep disturbances   MEDICATIONS  (STANDING):  ARIPiprazole 10 milliGRAM(s) Oral daily    MEDICATIONS  (PRN):  diphenhydrAMINE Injectable 50 milliGRAM(s) IntraMuscular once PRN severe agitation  LORazepam     Tablet 2 milliGRAM(s) Oral every 2 hours PRN CIWA score increase by 2 points and current CIWA score GREATER THAN 9  LORazepam     Tablet 2 milliGRAM(s) Oral every 6 hours PRN Agitation/ severe anxiety  LORazepam   Injectable 2 milliGRAM(s) IntraMuscular Once PRN severe agitation  traZODone 50 milliGRAM(s) Oral at bedtime PRN off label for sleep disturbances

## 2022-07-29 PROCEDURE — 90853 GROUP PSYCHOTHERAPY: CPT

## 2022-07-29 PROCEDURE — 99232 SBSQ HOSP IP/OBS MODERATE 35: CPT

## 2022-07-29 RX ORDER — SERTRALINE 25 MG/1
1 TABLET, FILM COATED ORAL
Qty: 30 | Refills: 0
Start: 2022-07-29 | End: 2022-08-27

## 2022-07-29 RX ORDER — ARIPIPRAZOLE 15 MG/1
1 TABLET ORAL
Qty: 30 | Refills: 0
Start: 2022-07-29 | End: 2022-08-27

## 2022-07-29 RX ADMIN — SERTRALINE 100 MILLIGRAM(S): 25 TABLET, FILM COATED ORAL at 08:14

## 2022-07-29 RX ADMIN — ARIPIPRAZOLE 10 MILLIGRAM(S): 15 TABLET ORAL at 08:13

## 2022-07-29 RX ADMIN — Medication 50 MILLIGRAM(S): at 23:26

## 2022-07-29 NOTE — BH INPATIENT PSYCHIATRY PROGRESS NOTE - NSBHMSESPABN_PSY_A_CORE
Soft volume/Decreased productivity

## 2022-07-29 NOTE — BH INPATIENT PSYCHIATRY PROGRESS NOTE - NSBHFUPINTERVALHXFT_PSY_A_CORE
Patient is followed up for MDD, admitted for depression, complex PTSD and borderline personality disorder. Prior to admission pt reported recent SIB by way of cutting forearm and neck (denied SA). Pt presents with multiple scars on BL arms from past SIB. Chart, medications and labs reviewed, no acute findings. Patient is discussed with nursing staff. No significant overnight issues. Patient remains compliant with all standing medication no SE reported or observed. Patient reports eating and sleeping well.    Patient was seen and is evaluated in the dayroom. Patient reports feeling “okay”. He denies feeling depressed, denies anxiety, or any other mood disturbances. Patient has been visible in the day room and is observed engaging in groups. Denies SI/SIB/HI, no plan or intent. ADL fair. No overt psychosis, no peter, paranoia. Denies AVH at this time. Patient offers no complaints, no behavioral concerns at this time. No acute medical concerns, VSS. Continue to provide therapeutic support.

## 2022-07-29 NOTE — BH INPATIENT PSYCHIATRY PROGRESS NOTE - CURRENT MEDICATION
MEDICATIONS  (STANDING):  ARIPiprazole 10 milliGRAM(s) Oral daily  sertraline 100 milliGRAM(s) Oral daily    MEDICATIONS  (PRN):  diphenhydrAMINE Injectable 50 milliGRAM(s) IntraMuscular once PRN severe agitation  traZODone 50 milliGRAM(s) Oral at bedtime PRN off label for sleep disturbances
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
## 2022-07-30 RX ADMIN — SERTRALINE 100 MILLIGRAM(S): 25 TABLET, FILM COATED ORAL at 08:13

## 2022-07-30 RX ADMIN — Medication 50 MILLIGRAM(S): at 21:34

## 2022-07-30 RX ADMIN — ARIPIPRAZOLE 10 MILLIGRAM(S): 15 TABLET ORAL at 08:13

## 2022-07-31 RX ORDER — DIPHENHYDRAMINE HCL 50 MG
25 CAPSULE ORAL ONCE
Refills: 0 | Status: COMPLETED | OUTPATIENT
Start: 2022-07-31 | End: 2022-07-31

## 2022-07-31 RX ADMIN — Medication 25 MILLIGRAM(S): at 04:37

## 2022-07-31 RX ADMIN — SERTRALINE 100 MILLIGRAM(S): 25 TABLET, FILM COATED ORAL at 08:34

## 2022-07-31 RX ADMIN — ARIPIPRAZOLE 10 MILLIGRAM(S): 15 TABLET ORAL at 08:34

## 2022-07-31 RX ADMIN — Medication 50 MILLIGRAM(S): at 22:49

## 2022-08-01 VITALS — TEMPERATURE: 96 F

## 2022-08-01 DIAGNOSIS — F60.3 BORDERLINE PERSONALITY DISORDER: ICD-10-CM

## 2022-08-01 DIAGNOSIS — F43.10 POST-TRAUMATIC STRESS DISORDER, UNSPECIFIED: ICD-10-CM

## 2022-08-01 PROCEDURE — 90853 GROUP PSYCHOTHERAPY: CPT

## 2022-08-01 PROCEDURE — 99238 HOSP IP/OBS DSCHRG MGMT 30/<: CPT

## 2022-08-01 PROCEDURE — 99232 SBSQ HOSP IP/OBS MODERATE 35: CPT | Mod: GC

## 2022-08-01 RX ADMIN — SERTRALINE 100 MILLIGRAM(S): 25 TABLET, FILM COATED ORAL at 08:15

## 2022-08-01 RX ADMIN — ARIPIPRAZOLE 10 MILLIGRAM(S): 15 TABLET ORAL at 08:15

## 2022-08-01 NOTE — BH INPATIENT PSYCHIATRY PROGRESS NOTE - NSBHADMITIPREASONDETAILS_PSY_A_CORE FT
self-inflicted lacerations to left forearm

## 2022-08-01 NOTE — BH INPATIENT PSYCHIATRY PROGRESS NOTE - OTHER
Denies active SI

## 2022-08-01 NOTE — BH INPATIENT PSYCHIATRY PROGRESS NOTE - NSBHATTESTAPPBILLTIME_PSY_A_CORE
I attest my time as SHANNA is greater than 50% of the total combined time spent on qualifying patient care activities. I have reviewed and verified the documentation.

## 2022-08-01 NOTE — BH INPATIENT PSYCHIATRY PROGRESS NOTE - NSBHATTESTTYPEVISIT_PSY_A_CORE
On-site Attending supervising SHANNA (99XXX codes)
On-site Attending supervising SHANNA (99XXX codes)
SHANNA without on-site Attending supervision
On-site Attending supervising SHANNA (99XXX codes)
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
## 2022-08-01 NOTE — BH INPATIENT PSYCHIATRY PROGRESS NOTE - NSBHMSEPERCEPT_PSY_A_CORE
Alcohol withdrawal syndrome, with unspecified complication
No abnormalities
Alcohol withdrawal syndrome, with unspecified complication
No abnormalities

## 2022-08-01 NOTE — BH PSYCHOLOGY - GROUP THERAPY NOTE - NSPSYCHOLGRPCOGINT_PSY_A_CORE FT
Cognitive/behavioral therapy, Emotion regulation/coping skills taught, Psychoeducation, Acceptance and Commitment Therapy (ACT)  
Cognitive/behavioral therapy, Emotion regulation/coping skills taught, Psychoeducation, Acceptance and Commitment Therapy (ACT)  
Cognitive behavioral therapy, Acceptance and Commitment Therapy, emotion regulation/coping skills taught, psychoeducation

## 2022-08-01 NOTE — BH INPATIENT PSYCHIATRY PROGRESS NOTE - NSBHATTESTBILLONSITE_PSY_A_CORE
SHANNA to bill

## 2022-08-01 NOTE — BH INPATIENT PSYCHIATRY DISCHARGE NOTE - DESCRIPTION
currently lives at home with father, uncle, grandmother and brothers. Completed HS. attended [employer] but was dismissed due to failing grades. Currently unemployed. 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 likes to write, play the guitar; listening to music; working out/ running, playing video games. no reported pets. no reported access to guns

## 2022-08-01 NOTE — BH INPATIENT PSYCHIATRY PROGRESS NOTE - NSBHFUPINTERVALHXFT_PSY_A_CORE
Patient is followed up for MDD, admitted for depression, complex PTSD and borderline personality disorder. Prior to admission pt reported recent SIB by way of cutting forearm and neck (denied SA). Pt presents with multiple scars on BL arms from past SIB. Chart, medications and labs reviewed, no acute findings. Patient is discussed with nursing staff. No significant overnight issues. Patient remains compliant with all standing medication no SE reported or observed. Patient reports eating and sleeping well.    Patient was seen and is evaluated in the dayroom. Patient reports feeling “good”. He denies feeling depressed, denies anxiety, or any other mood disturbances. Patient has been visible in the day room and is observed engaging in groups. Denies SI/SIB/HI, no plan or intent. ADL fair. No overt psychosis, no peter, paranoia. Denies AVH at this time. Patient offers no complaints, no behavioral concerns at this time. No acute medical concerns, VSS. Patient to be discharged home today.

## 2022-08-01 NOTE — BH INPATIENT PSYCHIATRY PROGRESS NOTE - NSTXDEPRESDATETRGT_PSY_ALL_CORE
05-Aug-2022
03-Aug-2022
01-Aug-2022
29-Jul-2022
03-Aug-2022
29-Jul-2022

## 2022-08-01 NOTE — BH INPATIENT PSYCHIATRY DISCHARGE NOTE - OTHER PAST PSYCHIATRIC HISTORY (INCLUDE DETAILS REGARDING ONSET, COURSE OF ILLNESS, INPATIENT/OUTPATIENT TREATMENT)
has multiple past in-patient hospitalizations (last [facility] admission in 3/2022 due to cutting behavior needing repair amidst alcohol intoxication)  - was found to have 4cm cut over right forearm. Laceration was irrigated and required 7 sutures. BAL was at 303  1 prior suicide attempt via asphyxiation whilst intoxicated, 1/2020  Current outpatient psychiatric treatment with Dr. ANA Hobson - last evaluated back in 3/23/2022  Current outpatient therapy with Dr. SPENCER Beasley

## 2022-08-01 NOTE — BH INPATIENT PSYCHIATRY PROGRESS NOTE - NSTXDCOTHRDATETRGT_PSY_ALL_CORE
05-Aug-2022
01-Aug-2022
05-Aug-2022
01-Aug-2022
05-Aug-2022

## 2022-08-01 NOTE — BH INPATIENT PSYCHIATRY PROGRESS NOTE - NSBHMETABOLIC_PSY_ALL_CORE_FT
BMI: BMI (kg/m2): 22.1 (07-21-22 @ 19:03)  HbA1c: A1C with Estimated Average Glucose Result: 5.4 % (03-10-22 @ 09:41)    Glucose: POCT Blood Glucose.: 105 mg/dL (02-19-22 @ 11:40)    BP: --  Lipid Panel: Date/Time: 03-10-22 @ 09:41  Cholesterol, Serum: 210  Direct LDL: --  HDL Cholesterol, Serum: 73  Total Cholesterol/HDL Ration Measurement: --  Triglycerides, Serum: 97  
BMI: BMI (kg/m2): 22.1 (07-21-22 @ 19:03)  HbA1c: A1C with Estimated Average Glucose Result: 5.4 % (03-10-22 @ 09:41)    Glucose: POCT Blood Glucose.: 105 mg/dL (02-19-22 @ 11:40)    BP: --  Lipid Panel: Date/Time: 03-10-22 @ 09:41  Cholesterol, Serum: 210  Direct LDL: --  HDL Cholesterol, Serum: 73  Total Cholesterol/HDL Ration Measurement: --  Triglycerides, Serum: 97  
BMI: BMI (kg/m2): 22.1 (07-21-22 @ 19:03)  HbA1c: A1C with Estimated Average Glucose Result: 5.4 % (03-10-22 @ 09:41)    Glucose: POCT Blood Glucose.: 105 mg/dL (02-19-22 @ 11:40)    BP: 140/78 (07-23-22 @ 11:55) (140/78 - 145/82)  Lipid Panel: Date/Time: 03-10-22 @ 09:41  Cholesterol, Serum: 210  Direct LDL: --  HDL Cholesterol, Serum: 73  Total Cholesterol/HDL Ration Measurement: --  Triglycerides, Serum: 97  
BMI: BMI (kg/m2): 22.1 (07-21-22 @ 19:03)  HbA1c: A1C with Estimated Average Glucose Result: 5.4 % (03-10-22 @ 09:41)    Glucose: POCT Blood Glucose.: 105 mg/dL (02-19-22 @ 11:40)    BP: --  Lipid Panel: Date/Time: 03-10-22 @ 09:41  Cholesterol, Serum: 210  Direct LDL: --  HDL Cholesterol, Serum: 73  Total Cholesterol/HDL Ration Measurement: --  Triglycerides, Serum: 97  
BMI: BMI (kg/m2): 22.1 (07-21-22 @ 19:03)  HbA1c: A1C with Estimated Average Glucose Result: 5.4 % (03-10-22 @ 09:41)    Glucose: POCT Blood Glucose.: 105 mg/dL (02-19-22 @ 11:40)    BP: --  Lipid Panel: Date/Time: 03-10-22 @ 09:41  Cholesterol, Serum: 210  Direct LDL: --  HDL Cholesterol, Serum: 73  Total Cholesterol/HDL Ration Measurement: --  Triglycerides, Serum: 97  
BMI: BMI (kg/m2): 22.1 (07-21-22 @ 19:03)  HbA1c: A1C with Estimated Average Glucose Result: 5.4 % (03-10-22 @ 09:41)    Glucose: POCT Blood Glucose.: 105 mg/dL (02-19-22 @ 11:40)    BP: 140/78 (07-23-22 @ 11:55) (128/75 - 145/82)  Lipid Panel: Date/Time: 03-10-22 @ 09:41  Cholesterol, Serum: 210  Direct LDL: --  HDL Cholesterol, Serum: 73  Total Cholesterol/HDL Ration Measurement: --  Triglycerides, Serum: 97  
BMI: BMI (kg/m2): 22.1 (07-21-22 @ 19:03)  HbA1c: A1C with Estimated Average Glucose Result: 5.4 % (03-10-22 @ 09:41)    Glucose: POCT Blood Glucose.: 105 mg/dL (02-19-22 @ 11:40)    BP: 145/82 (07-22-22 @ 16:02) (128/75 - 145/82)  Lipid Panel: Date/Time: 03-10-22 @ 09:41  Cholesterol, Serum: 210  Direct LDL: --  HDL Cholesterol, Serum: 73  Total Cholesterol/HDL Ration Measurement: --  Triglycerides, Serum: 97  
BMI: BMI (kg/m2): 22.1 (07-21-22 @ 19:03)  HbA1c: A1C with Estimated Average Glucose Result: 5.4 % (03-10-22 @ 09:41)    Glucose: POCT Blood Glucose.: 105 mg/dL (02-19-22 @ 11:40)    BP: 128/75 (07-21-22 @ 20:28) (128/75 - 139/76)  Lipid Panel: Date/Time: 03-10-22 @ 09:41  Cholesterol, Serum: 210  Direct LDL: --  HDL Cholesterol, Serum: 73  Total Cholesterol/HDL Ration Measurement: --  Triglycerides, Serum: 97  
BMI: BMI (kg/m2): 22.1 (07-21-22 @ 19:03)  HbA1c: A1C with Estimated Average Glucose Result: 5.4 % (03-10-22 @ 09:41)    Glucose: POCT Blood Glucose.: 105 mg/dL (02-19-22 @ 11:40)    BP: --  Lipid Panel: Date/Time: 03-10-22 @ 09:41  Cholesterol, Serum: 210  Direct LDL: --  HDL Cholesterol, Serum: 73  Total Cholesterol/HDL Ration Measurement: --  Triglycerides, Serum: 97

## 2022-08-01 NOTE — BH PSYCHOLOGY - GROUP THERAPY NOTE - NSPSYCHOLGRPCOGPROB_PSY_A_CORE FT
Anxiety, Depression, Emotion dysregulation, Lack of coping skills, self care, problem solving  
Mindfulness, emotion dysregulation, lack of coping skills, poor problem solving 
Anxiety, Depression, Emotion dysregulation, Lack of coping skills, self care, problem solving

## 2022-08-01 NOTE — BH INPATIENT PSYCHIATRY PROGRESS NOTE - NSTXDCOTHRDATEEST_PSY_ALL_CORE
22-Jul-2022
22-Jul-2022
29-Jul-2022
22-Jul-2022
22-Jul-2022
29-Jul-2022
22-Jul-2022

## 2022-08-01 NOTE — BH INPATIENT PSYCHIATRY PROGRESS NOTE - NSDCCRITERIA_PSY_ALL_CORE
patient no longer a danger to self/others, able to function in community

## 2022-08-01 NOTE — BH INPATIENT PSYCHIATRY DISCHARGE NOTE - NSDCMRMEDTOKEN_GEN_ALL_CORE_FT
ARIPiprazole 10 mg oral tablet: 1 tab(s) orally once a day  sertraline 100 mg oral tablet: 1 tab(s) orally once a day

## 2022-08-01 NOTE — BH INPATIENT PSYCHIATRY PROGRESS NOTE - NSBHASSESSSUMMFT_PSY_ALL_CORE
Patient is a 22 year old male, single, domiciled with father, and unemployed. He is not attending school. Pt has hx of MDD, complex PTSD and borderline personality disorder. With multiple past psych admissions, hx of self injurious behaviors via cutting and past hx of SA via asphyxiation using a plastic bag; hx of binge drinking alcohol as well as cannabis use. He presented to the ED BIB EMS as a referral from his SW due to SI as well as admitting to her that he had previously cut self.     Patient at this time reports improvement of depression, has been engaging and interacting with peers on the unit. He denies SI/SIB/HI, no plan or intent. No psychotic symptoms observed or elicited, denies AVH and delusional thinking. Patient will be discharged to home today and will have a virtual [facility] intake appointment tomorrow.     Plan:  > Discharge patient to home    >Psychiatric Meds:   - Continue Sertraline 100 mg tablet, daily  - Continue Abilify 10 mg tablet, daily    >Labs: Labs reviewed.    >Diet: Consistent Carbohydrate  >Social: milieu/structured therapy  >Treatment Interventions: Groups and Individual Therapy/CBT  >Dispo: Home

## 2022-08-01 NOTE — BH INPATIENT PSYCHIATRY PROGRESS NOTE - NSBHATTESTBILLINGAW_PSY_A_CORE
[id number] Inpatient care - moderate complexity - 25 minutes
[id number]-Ndqncguwik Inpatient care - moderate complexity - 25 minutes
[id number]-Mzgtwabqnj Inpatient care - moderate complexity - 25 minutes
[id number]-Pxpcmyrawo Inpatient care - moderate complexity - 25 minutes
[id number]-Oqgrnpqwlp Inpatient care - moderate complexity - 25 minutes
[id number]-Trqanvzmeh Inpatient care - moderate complexity - 25 minutes
[id number]-Lyoolexteb Inpatient care - moderate complexity - 25 minutes
[id number]-Wdvzjbebep Inpatient care - moderate complexity - 25 minutes
[id number]-Piblhvwjpv Inpatient care - moderate complexity - 25 minutes

## 2022-08-01 NOTE — BH PSYCHOLOGY - GROUP THERAPY NOTE - NSPSYCHOLGRPCOGGOAL_PSY_A_CORE FT
Decrease symptoms, Develop coping/emotion regulation skills, Psychoeducation  
Decrease symptoms, mindfulness, develop coping/emotion regulation skills, psychoeducation 
Decrease symptoms, Develop coping/emotion regulation skills, Psychoeducation

## 2022-08-01 NOTE — BH INPATIENT PSYCHIATRY DISCHARGE NOTE - HOSPITAL COURSE
22 yr old male, single, domiciled, and unemployed. He is not attending school. Pt has hx of MDD, complex PTSD and borderline personality disorder, with multiple past psych admissions, hx of self injurious behaviors via cutting and past hx of SA via asphyxiation using a plastic bag; hx of binge drinking alcohol as well as cannabis use. Today, presented to the ED BIB EMS as a referral from his SW due to SI as well as admitting to her that he had previously cut self. Patient was admitted to [facility] on 9.39 legal status due to suicidal ideations/SIB.   Patient initially presented with dysphoric mood, constricted affect, and reported mild anxiety. He was minimizing the severity of his actions which led to his hospitalization and was withdrawn on the unit. Patient was restarted on his home medication Zoloft, he was given Abilify as augmentation and control of depressive mood. Patient is now currently taking Zoloft 100 mg and Abilify 15 mg and has been compliant with medications on the unit. He has demonstrated good behavioral control, no incidents of agitation/aggressive behavior. Additionally, he has been engaging more with peers and has been participating in group activities. At this time patient denies having any S/H ideations, plan or intent. He does not overtly feel depressed or anxious and has showed significant improvement in clinical symptomology that led to his current presentation. Patient at this time no longer meets criteria of continued inpatient hospitalization and can safely be managed in the community.     22 yr old male, single, domiciled, and unemployed. He is not attending school. Pt has hx of MDD, complex PTSD and borderline personality disorder, with multiple past psych admissions, hx of self injurious behaviors via cutting and past hx of SA via asphyxiation using a plastic bag; hx of binge drinking alcohol as well as cannabis use. Today, presented to the ED BIB EMS as a referral from his SW due to SI as well as admitting to her that he had previously cut self. Patient was admitted to [facility] on 9.39 legal status due to suicidal ideations/SIB.   Patient initially presented with dysphoric mood, constricted affect, and reported mild anxiety. He was minimizing the severity of his actions which led to his hospitalization and was withdrawn on the unit. Patient was restarted on his home medication Zoloft, he was given Abilify as augmentation and control of depressive mood. Patient is now currently taking Zoloft 100 mg and Abilify 10 mg and has been compliant with medications on the unit. He has demonstrated good behavioral control, no incidents of agitation/aggressive behavior. Additionally, he has been engaging more with peers and has been participating in group activities. At this time patient denies having any S/H ideations, plan or intent. He does not overtly feel depressed or anxious and has showed significant improvement in clinical symptomology that led to his current presentation. Patient at this time no longer meets criteria of continued inpatient hospitalization and can safely be managed in the community.

## 2022-08-01 NOTE — BH INPATIENT PSYCHIATRY PROGRESS NOTE - NSICDXBHSECONDARYDX_PSY_ALL_CORE
Borderline personality disorder   F60.3  Alcohol abuse   F10.10  PTSD (post-traumatic stress disorder)   F43.10  Cannabis abuse   F12.10  

## 2022-08-01 NOTE — BH INPATIENT PSYCHIATRY PROGRESS NOTE - PRN MEDS
MEDICATIONS  (PRN):  diphenhydrAMINE Injectable 50 milliGRAM(s) IntraMuscular once PRN severe agitation  LORazepam     Tablet 2 milliGRAM(s) Oral every 2 hours PRN CIWA score increase by 2 points and current CIWA score GREATER THAN 9  LORazepam     Tablet 2 milliGRAM(s) Oral every 6 hours PRN Agitation/ severe anxiety  LORazepam   Injectable 2 milliGRAM(s) IntraMuscular Once PRN severe agitation  traZODone 50 milliGRAM(s) Oral at bedtime PRN off label for sleep disturbances  
MEDICATIONS  (PRN):  diphenhydrAMINE Injectable 50 milliGRAM(s) IntraMuscular once PRN severe agitation  traZODone 50 milliGRAM(s) Oral at bedtime PRN off label for sleep disturbances  
MEDICATIONS  (PRN):  diphenhydrAMINE Injectable 50 milliGRAM(s) IntraMuscular once PRN severe agitation  LORazepam     Tablet 2 milliGRAM(s) Oral every 2 hours PRN CIWA score increase by 2 points and current CIWA score GREATER THAN 9  LORazepam     Tablet 2 milliGRAM(s) Oral every 6 hours PRN Agitation/ severe anxiety  LORazepam   Injectable 2 milliGRAM(s) IntraMuscular Once PRN severe agitation  traZODone 50 milliGRAM(s) Oral at bedtime PRN off label for sleep disturbances  
MEDICATIONS  (PRN):  diphenhydrAMINE Injectable 50 milliGRAM(s) IntraMuscular once PRN severe agitation  LORazepam     Tablet 2 milliGRAM(s) Oral every 2 hours PRN CIWA score increase by 2 points and current CIWA score GREATER THAN 9  LORazepam     Tablet 2 milliGRAM(s) Oral every 6 hours PRN Agitation/ severe anxiety  LORazepam   Injectable 2 milliGRAM(s) IntraMuscular Once PRN severe agitation  traZODone 50 milliGRAM(s) Oral at bedtime PRN off label for sleep disturbances  
MEDICATIONS  (PRN):  diphenhydrAMINE Injectable 50 milliGRAM(s) IntraMuscular once PRN severe agitation  traZODone 50 milliGRAM(s) Oral at bedtime PRN off label for sleep disturbances

## 2022-08-01 NOTE — BH PSYCHOLOGY - GROUP THERAPY NOTE - TOKEN PULL-DIAGNOSIS
Primary Diagnosis:  MDD (major depressive disorder) [F32.9]        Problem Dx:   PTSD (post-traumatic stress disorder) [F43.10]      Borderline personality disorder [F60.3]

## 2022-08-01 NOTE — BH PSYCHOLOGY - GROUP THERAPY NOTE - NSPSYCHOLGRPCOGINT_PSY_A_CORE
group members provided support/group members suggested positive behaviors/mindfulness skills taught/relaxation skills practiced/other..
other..
group members provided support/group members suggested positive behaviors/mindfulness skills taught/relaxation skills practiced/other..

## 2022-08-01 NOTE — BH INPATIENT PSYCHIATRY PROGRESS NOTE - NSBHMSESPEECH_PSY_A_CORE
Abnormal as indicated, otherwise normal...
Abnormal as indicated, otherwise normal...
Normal volume, rate, productivity, spontaneity and articulation
Abnormal as indicated, otherwise normal...

## 2022-08-01 NOTE — BH INPATIENT PSYCHIATRY PROGRESS NOTE - NSTXPROBDEPRES_PSY_ALL_CORE
DEPRESSIVE SYMPTOMS
Affect and characteristics of appearance, verbalizations, behaviors are appropriate
DEPRESSIVE SYMPTOMS

## 2022-08-01 NOTE — BH PSYCHOLOGY - GROUP THERAPY NOTE - NSPSYCHOLGRPCOGPT_PSY_A_CORE FT
Pt attended Cognitive Behavioral Therapy Group. Acceptance and Commitment Therapy skills and concepts also utilized. The group started with a brief check-in during which patients shared something/someone that motivates them to change. This activity generated insightful participation and pts responded well to prompt. Pt contributed by offering an example of something that angers him - dishonesty. Next, the group discussed a handout that prompted patients to identify when they feel certain emotions. Lastly, the group discussed the challenges of thought suppression using a brief example (I.e., “try as hard as you can not to think of a white bear for the next 30 seconds”). Group leaders explained concepts, reinforced participation, and engaged patients in discussion. 
Patient attended Cognitive Behavioral Therapy Group utilizing concepts and skills from Acceptance and Commitment Therapy (ACT). The group started with a brief check in and mindfulness /relaxation exercise. The group then focused on the topic of distress tolerance and self care. Patients discussed acceptance of emotional experience and the concept of distress tolerance. Patients shared examples of healthy ways to cope with distress and ways to engage in healthy self-care (balanced sleep, healthy eating etc). The  explained concepts, reinforced participation, and engaged patients in the discussion.  
Patient attended Cognitive Behavioral Therapy Group utilizing concepts and skills from Acceptance and Commitment Therapy (ACT). The group started with a brief check in and mindfulness /relaxation exercise. The group then focused on the topic of distress tolerance and self care. Patients discussed acceptance of emotional experience and the concept of distress tolerance. Patients shared examples of healthy ways to cope with distress and ways to engage in healthy self-care (balanced sleep, healthy eating etc). The  explained concepts, reinforced participation, and engaged patients in the discussion.

## 2022-08-01 NOTE — BH INPATIENT PSYCHIATRY PROGRESS NOTE - NSBHCHARTREVIEWVS_PSY_A_CORE FT
Vital Signs Last 24 Hrs  T(C): 36.8 (07-22-22 @ 22:43), Max: 36.8 (07-22-22 @ 19:54)  T(F): 98.2 (07-22-22 @ 22:43), Max: 98.2 (07-22-22 @ 19:54)  HR: 58 (07-22-22 @ 16:02) (58 - 58)  BP: 145/82 (07-22-22 @ 16:02) (145/82 - 145/82)  BP(mean): --  RR: 18 (07-22-22 @ 16:02) (18 - 18)  SpO2: 100% (07-22-22 @ 16:02) (100% - 100%)    Orthostatic VS  07-22-22 @ 19:54  Lying BP: --/-- HR: --  Sitting BP: 150/88 HR: 73  Standing BP: 150/90 HR: 82  Site: --  Mode: --  Orthostatic VS  07-22-22 @ 08:53  Lying BP: --/-- HR: --  Sitting BP: 151/87 HR: 56  Standing BP: 142/90 HR: 74  Site: --  Mode: electronic  Orthostatic VS  07-21-22 @ 19:03  Lying BP: --/-- HR: --  Sitting BP: 140/73 HR: 61  Standing BP: 143/84 HR: 84  Site: --  Mode: --  
Vital Signs Last 24 Hrs  T(C): 36.7 (07-22-22 @ 14:39), Max: 36.9 (07-21-22 @ 20:28)  T(F): 98 (07-22-22 @ 14:39), Max: 98.5 (07-21-22 @ 20:28)  HR: 78 (07-21-22 @ 20:28) (78 - 78)  BP: 128/75 (07-21-22 @ 20:28) (128/75 - 128/75)  BP(mean): --  RR: 18 (07-21-22 @ 20:28) (16 - 18)  SpO2: 98% (07-21-22 @ 20:28) (98% - 99%)    Orthostatic VS  07-22-22 @ 08:53  Lying BP: --/-- HR: --  Sitting BP: 151/87 HR: 56  Standing BP: 142/90 HR: 74  Site: --  Mode: electronic  Orthostatic VS  07-21-22 @ 19:03  Lying BP: --/-- HR: --  Sitting BP: 140/73 HR: 61  Standing BP: 143/84 HR: 84  Site: --  Mode: --  
Vital Signs Last 24 Hrs  T(C): 36.1 (07-27-22 @ 06:42), Max: 36.7 (07-26-22 @ 19:57)  T(F): 97 (07-27-22 @ 06:42), Max: 98.1 (07-26-22 @ 19:57)  HR: --  BP: --  BP(mean): --  RR: --  SpO2: --    Orthostatic VS  07-27-22 @ 08:20  Lying BP: --/-- HR: --  Sitting BP: 134/70 HR: 107  Standing BP: 123/75 HR: 96  Site: --  Mode: --  Orthostatic VS  07-26-22 @ 19:57  Lying BP: --/-- HR: --  Sitting BP: 147/86 HR: 81  Standing BP: 134/88 HR: 95  Site: --  Mode: --  Orthostatic VS  07-26-22 @ 08:26  Lying BP: --/-- HR: --  Sitting BP: 123/71 HR: 79  Standing BP: 119/75 HR: 106  Site: upper left arm  Mode: electronic  
Vital Signs Last 24 Hrs  T(C): 35.8 (08-01-22 @ 06:34), Max: 36.9 (07-31-22 @ 19:39)  T(F): 96.4 (08-01-22 @ 06:34), Max: 98.5 (07-31-22 @ 19:39)  HR: --  BP: --  BP(mean): --  RR: --  SpO2: --    Orthostatic VS  08-01-22 @ 08:17  Lying BP: --/-- HR: --  Sitting BP: 119/86 HR: 78  Standing BP: 123/80 HR: 80  Site: --  Mode: --  Orthostatic VS  07-31-22 @ 19:39  Lying BP: --/-- HR: --  Sitting BP: 135/73 HR: 76  Standing BP: 140/79 HR: 89  Site: --  Mode: --  Orthostatic VS  07-31-22 @ 09:34  Lying BP: --/-- HR: --  Sitting BP: 127/72 HR: 68  Standing BP: 141/77 HR: 77  Site: --  Mode: --  Orthostatic VS  07-30-22 @ 19:40  Lying BP: --/-- HR: --  Sitting BP: 128/86 HR: 93  Standing BP: 137/93 HR: 83  Site: --  Mode: --  
Vital Signs Last 24 Hrs  T(C): 36.3 (07-29-22 @ 06:23), Max: 37 (07-28-22 @ 22:49)  T(F): 97.3 (07-29-22 @ 06:23), Max: 98.6 (07-28-22 @ 22:49)  HR: --  BP: --  BP(mean): --  RR: --  SpO2: --    Orthostatic VS  07-29-22 @ 08:44  Lying BP: --/-- HR: --  Sitting BP: 120/69 HR: 76  Standing BP: 133/78 HR: 91  Site: --  Mode: --  Orthostatic VS  07-28-22 @ 08:31  Lying BP: --/-- HR: --  Sitting BP: 118/79 HR: 81  Standing BP: 128/83 HR: 89  Site: --  Mode: electronic  Orthostatic VS  07-27-22 @ 19:50  Lying BP: --/-- HR: --  Sitting BP: 132/68 HR: 78  Standing BP: 132/74 HR: 90  Site: --  Mode: --  Orthostatic VS  07-27-22 @ 19:33  Lying BP: --/-- HR: --  Sitting BP: 120/78 HR: 91  Standing BP: 120/72 HR: 99  Site: --  Mode: --  
Vital Signs Last 24 Hrs  T(C): 36.3 (07-26-22 @ 06:41), Max: 36.3 (07-26-22 @ 06:41)  T(F): 97.3 (07-26-22 @ 06:41), Max: 97.3 (07-26-22 @ 06:41)  HR: --  BP: --  BP(mean): --  RR: --  SpO2: --    Orthostatic VS  07-26-22 @ 08:26  Lying BP: --/-- HR: --  Sitting BP: 123/71 HR: 79  Standing BP: 119/75 HR: 106  Site: upper left arm  Mode: electronic  Orthostatic VS  07-25-22 @ 08:41  Lying BP: --/-- HR: --  Sitting BP: 117/86 HR: 88  Standing BP: 120/71 HR: 93  Site: --  Mode: --  Orthostatic VS  07-24-22 @ 19:21  Lying BP: --/-- HR: --  Sitting BP: 151/93 HR: 69  Standing BP: 150/95 HR: 82  Site: --  Mode: --  
Vital Signs Last 24 Hrs  T(C): 36.6 (07-28-22 @ 08:31), Max: 36.9 (07-27-22 @ 19:33)  T(F): 97.8 (07-28-22 @ 08:31), Max: 98.5 (07-27-22 @ 19:33)  HR: --  BP: --  BP(mean): --  RR: --  SpO2: --    Orthostatic VS  07-28-22 @ 08:31  Lying BP: --/-- HR: --  Sitting BP: 118/79 HR: 81  Standing BP: 128/83 HR: 89  Site: --  Mode: electronic  Orthostatic VS  07-27-22 @ 19:50  Lying BP: --/-- HR: --  Sitting BP: 132/68 HR: 78  Standing BP: 132/74 HR: 90  Site: --  Mode: --  Orthostatic VS  07-27-22 @ 19:33  Lying BP: --/-- HR: --  Sitting BP: 120/78 HR: 91  Standing BP: 120/72 HR: 99  Site: --  Mode: --  Orthostatic VS  07-27-22 @ 08:20  Lying BP: --/-- HR: --  Sitting BP: 134/70 HR: 107  Standing BP: 123/75 HR: 96  Site: --  Mode: --  Orthostatic VS  07-26-22 @ 19:57  Lying BP: --/-- HR: --  Sitting BP: 147/86 HR: 81  Standing BP: 134/88 HR: 95  Site: --  Mode: --  
Vital Signs Last 24 Hrs  T(C): 36.7 (07-24-22 @ 08:57), Max: 37.1 (07-23-22 @ 19:15)  T(F): 98 (07-24-22 @ 08:57), Max: 98.8 (07-23-22 @ 19:15)  HR: 60 (07-23-22 @ 11:55) (60 - 60)  BP: 140/78 (07-23-22 @ 11:55) (140/78 - 140/78)  BP(mean): --  RR: --  SpO2: --    Orthostatic VS  07-24-22 @ 08:57  Lying BP: --/-- HR: --  Sitting BP: 125/91 HR: 66  Standing BP: 141/92 HR: 85  Site: --  Mode: --  Orthostatic VS  07-23-22 @ 19:15  Lying BP: --/-- HR: --  Sitting BP: 149/81 HR: 84  Standing BP: 145/87 HR: 91  Site: --  Mode: --  Orthostatic VS  07-22-22 @ 19:54  Lying BP: --/-- HR: --  Sitting BP: 150/88 HR: 73  Standing BP: 150/90 HR: 82  Site: --  Mode: --  
Vital Signs Last 24 Hrs  T(C): 37 (07-25-22 @ 08:41), Max: 37 (07-25-22 @ 08:41)  T(F): 98.6 (07-25-22 @ 08:41), Max: 98.6 (07-25-22 @ 08:41)  HR: --  BP: --  BP(mean): --  RR: --  SpO2: --    Orthostatic VS  07-25-22 @ 08:41  Lying BP: --/-- HR: --  Sitting BP: 117/86 HR: 88  Standing BP: 120/71 HR: 93  Site: --  Mode: --  Orthostatic VS  07-24-22 @ 19:21  Lying BP: --/-- HR: --  Sitting BP: 151/93 HR: 69  Standing BP: 150/95 HR: 82  Site: --  Mode: --  Orthostatic VS  07-24-22 @ 08:57  Lying BP: --/-- HR: --  Sitting BP: 125/91 HR: 66  Standing BP: 141/92 HR: 85  Site: --  Mode: --  Orthostatic VS  07-23-22 @ 19:15  Lying BP: --/-- HR: --  Sitting BP: 149/81 HR: 84  Standing BP: 145/87 HR: 91  Site: --  Mode: --

## 2022-08-01 NOTE — BH PSYCHOLOGY - GROUP THERAPY NOTE - NSBHPSYCHOLPARTICIPCOMMENT_PSY_A_CORE FT
Pt was primarily quiet but spoke when called on by .
Pt was primarily quiet but spoke when called on by .
Pt was engaged in group discussion, responded to others appropriately, and meaningfully contributed to group discussion. Thought processes linear, speech within normal limits, and oriented x3.

## 2022-08-02 ENCOUNTER — OUTPATIENT (OUTPATIENT)
Dept: OUTPATIENT SERVICES | Facility: HOSPITAL | Age: 22
LOS: 1 days | Discharge: HOME | End: 2022-08-02
Payer: COMMERCIAL

## 2022-08-02 PROCEDURE — 90792 PSYCH DIAG EVAL W/MED SRVCS: CPT

## 2022-08-03 DIAGNOSIS — F10.10 ALCOHOL ABUSE, UNCOMPLICATED: ICD-10-CM

## 2022-08-03 DIAGNOSIS — F60.3 BORDERLINE PERSONALITY DISORDER: ICD-10-CM

## 2022-08-03 DIAGNOSIS — F32.9 MAJOR DEPRESSIVE DISORDER, SINGLE EPISODE, UNSPECIFIED: ICD-10-CM

## 2022-08-03 DIAGNOSIS — F43.10 POST-TRAUMATIC STRESS DISORDER, UNSPECIFIED: ICD-10-CM

## 2022-08-08 PROCEDURE — 99214 OFFICE O/P EST MOD 30 MIN: CPT

## 2022-08-08 NOTE — SOCIAL WORK POST DISCHARGE FOLLOW UP NOTE - NSBHSWFOLLOWUP_PSY_ALL_CORE_FT
Please note pt discharge appointment was at [facility]. This is was updated and reflected in the notes as previous AOPD was entered by error.  Pt linked and attended this [facility] appointment.

## 2022-08-15 PROCEDURE — 99214 OFFICE O/P EST MOD 30 MIN: CPT

## 2022-09-16 NOTE — BH INPATIENT PSYCHIATRY PROGRESS NOTE - NSTXDEPRESDATEEST_PSY_ALL_CORE
22-Jul-2022
27-Jul-2022
27-Jul-2022
22-Jul-2022
Methotrexate Pregnancy And Lactation Text: This medication is Pregnancy Category X and is known to cause fetal harm. This medication is excreted in breast milk.
22-Jul-2022

## 2023-01-01 NOTE — BH SOCIAL WORK INITIAL PSYCHOSOCIAL EVALUATION - NSBHHOUSESPA_PSY_ALL_CORE
PROGRESS NOTE       Date of Service: 2023   Jad Quinonez twin A MRN: [id number] PAC: [id number]         Physical Exam DOL: 9   GA: 29 wks 3 d   CGA: 30 wks 5 d   BW: 1505   Weight: 1595   Change 24h: 72   Change 7d: 249   Place of Service: NICU   Bed Type: Incubator      Intensive Cardiac and respiratory monitoring, continuous and/or frequent vital   sign monitoring      Vitals / Measurements:   T: 37.3   HR: 177   RR: 75   BP: 59/30 (37)   SpO2: 94      Head/Neck: Head is normal in size and configuration. Anterior fontanel is flat,   open, and soft.  Sutures slightly overriding.  bCPAP secured.      Chest: Equal bubbling to bases. Breath sounds clear bilaterally. Minimal IC   retractions consistent with degree of prematurity.       Heart:  Grade 2/6 murmur heard LSB. Brachial and femoral pulses are 2-3+. Brisk   capillary refill.      Abdomen: Soft, non-tender, and non-distended. Bowel sounds are present.       Genitalia: Normal external male genitalia are present.      Extremities: No deformities noted. Normal range of motion for all extremities.   PICC infusing in left arm without sign of complications.      Neurologic: Infant responds appropriately. Tone appropriate for gestation      Skin: Pink and well perfused. No rashes, petechiae, or other lesions are noted.   +jaundice.         Procedures   Peripherally Inserted Central Line (PICC),   2023,   8,   NICU,   XXX, XXX   Comment: MONALISA Fairchild, RN-26g trimmed to 14cm and inserted 13.5cm in left   cephalic vein.  Tip SVC.         Medication   Active Medications:   Caffeine Citrate, Start Date: 2023, Duration: 10   Comment: 5mg/kg daily      Evivo Probiotic, Start Date: 2023, Duration: 10         Respiratory Support:   Type: Nasal CPAP FiO2: 0.21 CPAP: 4    Start Date: 2023   Duration: 10         Diagnoses   System: FEN/GI   Diagnosis: Nutritional Support   starting 2023      History: Euglycemic since admission.  TPN started on  admission.  Feedings   started with BM on 5/24. To 22 alma with Enf HMF on 5/29. To 24 alma with Enf HMF   on 6/1.      Assessment: Weight up 72 grams.  On vTPN via PICC.  Tolerating feedings of   MBM/DBM 22 alma with Enf HMF 24mls q 3 hours by gavage. Voiding, stooling. Last   glucose 90.      Plan: Adjust vTPN per labs and clinical condition.  Fluids 150-160ml/kg/day.     Advance feedings of MBM/DBM to 24 alma with Enf HMF and hold volume at 24 mls q 3   hours by gavage.    Follow lytes and glucoses as indicated.   Lactation support.      System: Respiratory   Diagnosis: Respiratory Distress Syndrome (P22.0)   starting 2023      History: CPAP in  Placed on Nasal CPAP support on admission +5/35% on admit,   increased to bCPAP +6 with FiO2 down to 28%.      Assessment: Stable on bubble CPAP +4, 21%.      Plan: Titrate Nasal CPAP support as needed. Follow chest X-ray and blood gases   as needed.      System: Apnea-Bradycardia   Diagnosis: At risk for Apnea   starting 2023      History: This is a 29 wks premature infant at risk for Apnea of Prematurity.      Assessment: No events requiring stim.      Plan: Continuous monitoring and oximetry.   Maintenance caffeine.      System: Cardiovascular   Diagnosis: Heart Murmur-unspecified (R01.1)   starting 2023      Ventricular Septal Defect (Q21.0)   starting 2023      History: 2/6 murmur noted on 5/25, normal pulses, well perfused.  Echocardiogram   done on 5/26 showed small PDA left to right, small to moderate muscular VSD,   small atrial level communication left to right, mild tricuspid regurg, mild   pulmonary hypertension, normal biventricular function.      Plan: Repeat echocardiogram in 4 weeks or sooner if there are increasing O2   needs of unknown etiology-due by 6/23.      System: Infectious Disease   Diagnosis: Infectious Screen <= 28D (P00.2)   starting 2023      History: Premature onset of labor with history of shortened cervix. ROM  at   delivery and fluids clear. Blood cultures were obtained. Patient was placed on   Ampicillin, and Gentamicin for 36 hour r/o.      Assessment: Appears well on exam.      Plan: Follow for clinical indications of infection.      System: Neurology   Diagnosis: At risk for Intraventricular Hemorrhage   starting 2023      History: Based on Gestational Age of 29 weeks, infant meets criteria for   screening.      Assessment: At risk for Intraventricular Hemorrhage.      Plan: Repeat cranial US at 36 weeks to r/o PVL.      Neuroimaging   Date: 2023 Type: Cranial Ultrasound   Grade-L: No Bleed Grade-R: No Bleed       System: Gestation   Diagnosis: Prematurity 5074-1798 gm (P07.16)   starting 2023      History: This is a 29 wks and 1505 grams premature infant. Larger of twins.    History of  labor with shortened cervix.      Plan: PT/OT while inpatient.      System: Hyperbilirubinemia   Diagnosis: At risk for Hyperbilirubinemia   starting 2023      History: Mom Opos. BBT O+, gigi neg. This is a 29 wks premature infant, at   risk for exaggerated and prolonged jaundice related to prematurity.   Phototherapy -->      Assessment: T. bili 5.1       Plan: Recheck bili on Saturday.      System: Ophthalmology   Diagnosis: At risk for Retinopathy of Prematurity   starting 2023      History: Based on Gestational Age of 29 weeks and weight of 1505 grams infant   meets criteria for screening.      Assessment: At risk for Retinopathy of Prematurity.      Plan: Ophthalmology referral for retinopathy screening. Sticker in book for   .      System: Psychosocial Intervention   Diagnosis: Psychosocial Intervention   starting 2023      History: Surrogate mother. Adopted parents from [place]. 1st kids.  admit   conference with Dr. Alcantara.  updated parents ECHO results, awaiting still   Cards recs.      Assessment: Parents visiting frequently and providing cares.      Plan:  consult social work.      System: Central Vascular Access   Diagnosis: Central Vascular Access   starting 2023      History: PICC needed for nutrition. PICC placed on 5/25 with tip SVC.  Tip CA   junction on 5/26.      Assessment: Remains on TPN.      Plan: Assess daily for need to continue PICC.   Weekly CXR for tip placement due on Friday.         Attestation      On this day of service, this patient required critical care services which   included high complexity assessment and management necessary to support vital   organ system function. The attending physician provided on-site coordination of   the healthcare team inclusive of the advanced practitioner which included   patient assessment, directing the patient's plan of care, and making decisions   regarding the patient's management on this visit's date of service as reflected   in the documentation above.      Authenticated by: KEVIN LUND   Date/Time: 2023 09:10       No

## 2023-01-04 NOTE — ED BEHAVIORAL HEALTH ASSESSMENT NOTE - NS ED BHA TELEPSYCH PATIENT INTERVIEW
Continue ibuprofen every 6 hours as needed.  May alternate with acetaminophen.  You may use the Tylenol with codeine for severe pain.  Follow-up with primary care if pain persist.  
Patient on 1:1

## 2023-02-23 NOTE — DISCHARGE NOTE BEHAVIORAL HEALTH - NSBHDCCRISISPROB3FT_PSY_A_CORE
Any urges to use drugs or alcohol. Dupixent Pregnancy And Lactation Text: This medication likely crosses the placenta but the risk for the fetus is uncertain. This medication is excreted in breast milk.

## 2023-04-05 NOTE — BH SOCIAL WORK INITIAL PSYCHOSOCIAL EVALUATION - NSBHOASASADM_PSY_ALL_CORE
[facility] INPATIENT ENCOUNTER  PEDIATRICS DAILY PROGRESS NOTE    ADMISSION DATE:  4/4/2023  DATE:  4/5/2023  CURRENT HOSPITAL DAY:  Hospital Day: 2   ATTENDING PHYSICIAN:  Altagracia Pollack MD  CODE STATUS:  Full Resuscitation    CHIEF COMPLAINT:  Increased WOB    ACTIVE PROBLEMS:    Active Hospital Problems    Diagnosis    • Bronchiolitis        INTERVAL HISTORY:    Hesham Key is a 6 month old male patient admitted with Bronchiolitis [J21.9]. Patient saturating well on 10L 30%FiO2 with nasal suction. Not requiring deep suction overnight.     REVIEW OF SYSTEMS:  Review of Systems   Constitutional: Negative for fever.   HENT: Positive for congestion.    Respiratory: Positive for cough.    Cardiovascular: Negative for fatigue with feeds.   Gastrointestinal: Negative for abdominal distention, constipation, diarrhea and vomiting.       MEDICATIONS:    Current Facility-Administered Medications   Medication Dose Route Frequency Provider Last Rate Last Admin   • acetaminophen (TYLENOL) 160 MG/5ML solution 112 mg  15 mg/kg (Dosing Weight) Oral Q6H PRN Rei Chino MD   112 mg at 04/04/23 2021   • ibuprofen (CHILDRENS ADVIL) 100 MG/5ML suspension 74 mg  10 mg/kg (Dosing Weight) Oral Q6H PRN Gloria Christianson DO   74 mg at 04/04/23 2132       OBJECTIVE:    VITAL SIGNS:     Vital Last Value 24 Hour Range   Temperature 97.2 °F (36.2 °C) (04/04/23 2340) Temp  Min: 97.2 °F (36.2 °C)  Max: 101.8 °F (38.8 °C)   Pulse 117 (04/05/23 0344) Pulse  Min: 116  Max: 189   Respiratory 30 (04/05/23 0344) Resp  Min: 30  Max: 60   Non-Invasive  Blood Pressure  (crying. unable to obtain) (04/05/23 0344) BP  Min: 109/54  Max: 119/72   Pulse Oximetry 97 % (04/05/23 0344) SpO2  Min: 88 %  Max: 97 %     Vital Today Admitted   Weight 7.5 kg (16 lb 8.6 oz) (04/04/23 1302) Weight: 7.5 kg (16 lb 8.6 oz) (04/04/23 1302)   Height N/A     Body Mass Index N/A       INTAKE/OUTPUT:      Intake/Output Summary (Last 24 hours) at 4/5/2023 0537  Last data  filed at 4/5/2023 0300  Gross per 24 hour   Intake 345 ml   Output 152 ml   Net 193 ml         PHYSICAL EXAM:    Physical Exam  Constitutional:       General: He is active.   HENT:      Head: Normocephalic and atraumatic.      Nose: Congestion present.      Neck: Neck supple.   Eyes:      Conjunctiva/sclera: Conjunctivae normal.   Cardiovascular:      Rate and Rhythm: Normal rate and regular rhythm.   Pulmonary:      Breath sounds: No wheezing.      Comments: Coarse to auscultation at the bases.  Abdominal:      General: There is no distension.      Tenderness: There is no abdominal tenderness. There is no guarding or rebound.   Neurological:      Mental Status: He is alert.         LABORATORY DATA:    Recent Results (from the past 24 hour(s))   COVID/Flu/RSV panel    Collection Time: 04/04/23  1:07 PM   Result Value Ref Range    Rapid SARS-COV-2 by PCR Not Detected Not Detected / Detected / Presumptive Positive / Inhibitors present    Influenza A by PCR Not Detected Not Detected    Influenza B by PCR Not Detected Not Detected    RSV BY PCR Not Detected Not Detected    Isolation Guidelines      Procedural Comment     Urinalysis With Microscopy Exam W/O C/S    Collection Time: 04/04/23 10:38 PM   Result Value Ref Range    COLOR, URINALYSIS Yellow     APPEARANCE, URINALYSIS Clear     GLUCOSE, URINALYSIS Negative Negative mg/dL    BILIRUBIN, URINALYSIS Negative Negative    KETONES, URINALYSIS Negative Negative mg/dL    SPECIFIC GRAVITY, URINALYSIS 1.011 1.005 - 1.030    OCCULT BLOOD, URINALYSIS Negative Negative    PH, URINALYSIS 5.0 5.0 - 7.0    PROTEIN, URINALYSIS Negative Negative mg/dL    UROBILINOGEN, URINALYSIS 0.2 0.2, 1.0 mg/dL    NITRITE, URINALYSIS Negative Negative    LEUKOCYTE ESTERASE, URINALYSIS Negative Negative    SQUAMOUS EPITHELIAL, URINALYSIS None Seen None Seen, 1 to 5 /hpf    ERYTHROCYTES, URINALYSIS 1 to 2 None Seen, 1 to 2 /hpf    LEUKOCYTES, URINALYSIS 1 to 5 None Seen, 1 to 5 /hpf    BACTERIA,  URINALYSIS None Seen None Seen /hpf    HYALINE CASTS, URINALYSIS None Seen None Seen, 1 to 5 /lpf    MUCUS Present         IMAGING STUDIES:    XR CHEST PA OR AP 1 VIEW    Result Date: 4/4/2023  Narrative: EXAMINATION: XR CHEST AP OR PA  EXAM DATE: 4/4/2023 1:12 PM CLINICAL HISTORY: 6 months Male  HYPOXIA/DIFFICULTY BREATHING S/P BRONCHIOLITIS, eval for pneumonia  COMPARISON: None FINDINGS: Normal cardiomediastinal silhouette. Hazy and patchy opacities predominate in the upper lobes, greater in the left.  Left medial basilar atelectasis is noted as well.  No pleural effusion or pneumothorax is seen.  Lungs are not hyperinflated. Upper abdomen is unremarkable. Skeletal structures appear normal.  There is no displaced fracture.     Impression: Nonspecific opacities, consistent with reactive airways disease with mild atelectases.  Viral pneumonitis  is not excluded. Electronically Signed by: GALDINO PEDRO M.D. Signed on: 4/4/2023 2:04 PM Workstation ID: [id number]                             Assessment:   Hesham Key is a 6 month old male with PMH of previous hospital observation for viral bronchiolitis 2 weeks ago who presented for worsening cough and congestion for 4 days and increased WOB. He was found to have AHFR 2/2 viral bronchiolitis. Patient Tmax 101.8 yesterday evening. Afebrile overnight requiring  10L, 30%FiO2 saturating well overnight. Wean off HFNC today as tolerated.     ACTIVE PROBLEMS:    Active Hospital Problems    Diagnosis    • Bronchiolitis             Plan:  Neuro/Pain:  - tylenol 15mg/kg q6h prn     Resp:  - On HFNC 10L 25%, HF discontinued while in patient room on rounds. Wean as tolerated  - Suction prn  - No indication for bronchodilator/steroid at this time  - Continuous pulse oximetry     CV:  - HDS     FEN/GI:  - Producing wet diapers and PO intake improving, continue GPD  - Encourage PO intake      ID:  - quad negative  - cxr consistent with viral etiology  Antibiotic Decision  Making  Patient on Antibiotics: - No        VTE: 0 (None), at baseline mobility, too small for SCDs  Lines: none. Function, utilization, and necessity addressed/discussed on rounds     DISPO: will be ready for discharge when weaned off of HFNC and suctioning.  Plan was discussed with supervising attending. Above assessment and plan was discussed with family who agreed. All questions addressed.       Reba Frias DO   Family Medicine PGY1  Inpatient Pediatric Service   4/5/2023 5:37 AM    No

## 2023-04-14 NOTE — BEHAVIORAL HEALTH ASSESSMENT NOTE - CURRENT ACTIVE IDEATION
Right ear cleaned.  Both ears look normal.    Pressure test done today.   Follow up with Neurologist for further evaluation for Trigeminal Neuralgia.  
Yes

## 2023-05-11 PROCEDURE — 99214 OFFICE O/P EST MOD 30 MIN: CPT | Mod: 95

## 2023-05-24 NOTE — BH PATIENT PROFILE - NSPROGENOTHERPROVIDER_GEN_A_NUR
[FreeTextEntry1] : We discussed the difference between microscopic and gross hematuria. Potential benign and malignant urological conditions that can cause hematuria were reviewed. Also, non-urologic causes of hematuria were reviewed. Workup including renal imaging (ultrasonography, CT scan, MRI), urine studies and cystoscopy were reviewed. Workup based on risk stratification including age, degree of hematuria and risk factors were discussed. Risks of cystoscopy were discussed. Patient was made aware that despite adequate workup, the cause for hematuria may not be discovered and continued follow-up is recommended. Patient's questions were answered.  Also for follow-up to having history of kidney stones he will undergo renal ultrasound.  When he decides on proceeding with cystoscopy he will get back to me.\par We discussed the underlying mechanism for erections, pathophysiology of erectile dysfunction (ED) and treatment options. Role of smoking, diabetes, hypertension, hyperlipidemia, coronary artery disease and treatment for benign and malignant prostate conditions was discussed as some of the more common causes of ED. Exercise, weight loss and a healthy lifestyle can be beneficial. We discussed testosterone (T) and its possible link to increased desire for sexual activity, feeling energetic, muscle mass, etc. Low T as a cause for ED is less clear. Mechanism by which PDE-5 inhibitors improve erections was discussed. Medications in this category include Viagra, Levitra, Staxyn, Cialis and Stendra. As needed versus daily dosing was discussed.  If he decides on using tadalafil, he will get back to me as well.\par \par Brayan Palm MD, FACS\par [facility]\par  of Urology\par \par [address], [address]\[place] [address]\par \par [address], [address]\[place] [address]\par \par Tel: [phone]\par Fax: [phone] none

## 2023-07-03 NOTE — ED BEHAVIORAL HEALTH ASSESSMENT NOTE - PREPARATORY ACTS:
Car seat ,[email or web address] None known

## 2023-08-28 NOTE — ED PROVIDER NOTE - TOBACCO USE
Never smoker Topical Ketoconazole Counseling: Patient counseled that this medication may cause skin irritation or allergic reactions.  In the event of skin irritation, the patient was advised to reduce the amount of the drug applied or use it less frequently.   The patient verbalized understanding of the proper use and possible adverse effects of ketoconazole.  All of the patient's questions and concerns were addressed.

## 2023-11-09 PROCEDURE — 99214 OFFICE O/P EST MOD 30 MIN: CPT | Mod: 95

## 2024-03-14 NOTE — ED BEHAVIORAL HEALTH ASSESSMENT NOTE - NSSUICRSKFACTOR_PSY_ALL_CORE
1.54 Current and Past Psychiatric Diagnoses/Presenting Symptoms/Historical Factors/Treatment Related Factors/Activating Events/Stressors

## 2024-03-15 NOTE — ED BEHAVIORAL HEALTH ASSESSMENT NOTE - SAFETY PLAN DISCUSSED WITH:
Called patient x4 with  [phone], but no answer. VM message with callback number were left regarding rescheduling pre-op appointment. Patient was offered pre-op appointment of Monday, 3/18 at 08:00. Patient was instructed that if she does not have a pre-operative exam, her 3/21 surgery will be canceled.   Patient

## 2024-05-07 NOTE — ED PROVIDER NOTE - DATE/TIME 1
Psychiatric: Well oriented, normal mood and affect.   Skin: No rashes, lesions or ulcers, normal temperature, turgor, and texture on uninvolved extremity.      ORTHO EXAM:    Left Shoulder:     Inspection: No deformity, No erythema  ROM: Active / passive forward flexion 180 / 180  Active / passive abduction 170 / 170  Internal rotation 60  External rotation 80  Tenderness: tenderness to distal infraspinatus, quadrilateral space, mild rtc insertion pain.   Strength: Abduction 5/5, External rotation 5/5, Internal rotation 5/5  Provocative tests: Jobes positive.  Hornblower's negative.  He does have pain with resisted external rotation.  Bob positive.  Negative cross body adduction.  Negative anterior and posterior apprehension  Normal capillary refill / 2+ radial pulse   Sensation intact to light touch          DIAGNOSTIC IMAGING:     X-ray 3 vw LEFT shoulder Grashey  / axillary / outlet taken today for shoulder pain    Findings: AC joint and glenohumeral joint preserved without significant arthritis.  Type I acromion.  No osseous lesion or fracture  Impression: Normal x-ray left shoulder    I independently interpreted XR taken today    MRI report reviewed but no images available.  MRI report showed partial-thickness articular sided supraspinatus tear at insertion, mild AC joint arthritis.  No labral tear.    ASSESSMENT/PLAN:   1. Tear of left rotator cuff, unspecified tear extent, unspecified whether traumatic    2. Chronic left shoulder pain       Víctor has acute worsening of left shoulder pain with a history of rotator cuff tear.  He is young and an age where if he had a worsening tear we would consider surgical pair.  I recommend MRI of the left shoulder to evaluate for rotator cuff tear.  Pending the imaging we will discuss further treatment.  He is agreeable with care plan.    Orders / medications today:   Orders Placed This Encounter    XR SHOULDER LEFT (MIN 2 VIEWS)     Left Shoulder: Grashey, Outlet  11-Oct-2018 20:16

## 2024-07-02 PROCEDURE — 99214 OFFICE O/P EST MOD 30 MIN: CPT | Mod: 95

## 2024-08-27 ENCOUNTER — OUTPATIENT (OUTPATIENT)
Dept: OUTPATIENT SERVICES | Facility: HOSPITAL | Age: 24
LOS: 1 days | Discharge: ANOTHER HOSPITAL | End: 2024-08-27

## 2024-09-13 DIAGNOSIS — F33.1 MAJOR DEPRESSIVE DISORDER, RECURRENT, MODERATE: ICD-10-CM

## 2024-09-27 NOTE — BH PATIENT PROFILE - NSTOBACCONEVERSMOKERY/N_GEN_A
Large effusion seen on ultrasound as well as extensive synovitis.  We attempted aspiration, but no fluid could be retrieved.
No

## 2025-02-14 NOTE — BH TREATMENT PLAN - NSTXSUICIDINTERRN_PSY_ALL_CORE
Assess pt for S/I/I/P and SIB, explore healthy coping skills and protective factors, provide support, maintain safety, identify triggers, encourage pt to participate in groups and unit activities, administer and educate on ordered medications DVT PPX heparin 5,000 u BID

## 2025-03-25 NOTE — ED ADULT NURSE NOTE - NS_BH TRG QUESTION8_ED_ALL_ED
Faxed over referral and attached office notes to Dr.Nicholas Annetta ALLEN urologist office regarding patient to be seen to evaluation for parathyroidectomy.           Confirmed faxed received by Napoleon    Depression (without Suicidality or Psychosis)